# Patient Record
Sex: MALE | Race: OTHER | Employment: FULL TIME | URBAN - METROPOLITAN AREA
[De-identification: names, ages, dates, MRNs, and addresses within clinical notes are randomized per-mention and may not be internally consistent; named-entity substitution may affect disease eponyms.]

---

## 2021-01-01 ENCOUNTER — APPOINTMENT (OUTPATIENT)
Dept: GENERAL RADIOLOGY | Facility: HOSPITAL | Age: 46
DRG: 207 | End: 2021-01-01
Attending: INTERNAL MEDICINE
Payer: OTHER GOVERNMENT

## 2021-01-01 ENCOUNTER — ANESTHESIA (OUTPATIENT)
Dept: MEDSURG UNIT | Facility: HOSPITAL | Age: 46
DRG: 207 | End: 2021-01-01
Payer: OTHER GOVERNMENT

## 2021-01-01 ENCOUNTER — APPOINTMENT (OUTPATIENT)
Dept: CT IMAGING | Facility: HOSPITAL | Age: 46
DRG: 207 | End: 2021-01-01
Attending: INTERNAL MEDICINE
Payer: OTHER GOVERNMENT

## 2021-01-01 ENCOUNTER — HOSPITAL ENCOUNTER (INPATIENT)
Facility: HOSPITAL | Age: 46
LOS: 54 days | DRG: 207 | End: 2022-01-01
Attending: EMERGENCY MEDICINE | Admitting: INTERNAL MEDICINE
Payer: OTHER GOVERNMENT

## 2021-01-01 ENCOUNTER — APPOINTMENT (OUTPATIENT)
Dept: GENERAL RADIOLOGY | Facility: HOSPITAL | Age: 46
DRG: 207 | End: 2021-01-01
Attending: ANESTHESIOLOGY
Payer: OTHER GOVERNMENT

## 2021-01-01 ENCOUNTER — APPOINTMENT (OUTPATIENT)
Dept: ULTRASOUND IMAGING | Facility: HOSPITAL | Age: 46
DRG: 207 | End: 2021-01-01
Attending: INTERNAL MEDICINE
Payer: OTHER GOVERNMENT

## 2021-01-01 ENCOUNTER — APPOINTMENT (OUTPATIENT)
Dept: GENERAL RADIOLOGY | Facility: HOSPITAL | Age: 46
DRG: 207 | End: 2021-01-01
Attending: EMERGENCY MEDICINE
Payer: OTHER GOVERNMENT

## 2021-01-01 ENCOUNTER — APPOINTMENT (OUTPATIENT)
Dept: CT IMAGING | Facility: HOSPITAL | Age: 46
DRG: 207 | End: 2021-01-01
Attending: EMERGENCY MEDICINE
Payer: OTHER GOVERNMENT

## 2021-01-01 DIAGNOSIS — J12.82 PNEUMONIA DUE TO COVID-19 VIRUS: Primary | ICD-10-CM

## 2021-01-01 DIAGNOSIS — E11.65 TYPE 2 DIABETES MELLITUS WITH HYPERGLYCEMIA, WITHOUT LONG-TERM CURRENT USE OF INSULIN (HCC): ICD-10-CM

## 2021-01-01 DIAGNOSIS — U07.1 PNEUMONIA DUE TO COVID-19 VIRUS: Primary | ICD-10-CM

## 2021-01-01 DIAGNOSIS — R09.02 HYPOXIA: ICD-10-CM

## 2021-01-01 LAB
ADENOVIRUS PCR:: NOT DETECTED
ALBUMIN SERPL-MCNC: 1.3 G/DL (ref 3.4–5)
ALBUMIN SERPL-MCNC: 1.4 G/DL (ref 3.4–5)
ALBUMIN SERPL-MCNC: 1.5 G/DL (ref 3.4–5)
ALBUMIN SERPL-MCNC: 1.5 G/DL (ref 3.4–5)
ALBUMIN SERPL-MCNC: 1.6 G/DL (ref 3.4–5)
ALBUMIN SERPL-MCNC: 1.6 G/DL (ref 3.4–5)
ALBUMIN SERPL-MCNC: 1.7 G/DL (ref 3.4–5)
ALBUMIN SERPL-MCNC: 1.8 G/DL (ref 3.4–5)
ALBUMIN SERPL-MCNC: 2 G/DL (ref 3.4–5)
ALBUMIN SERPL-MCNC: 2.1 G/DL (ref 3.4–5)
ALBUMIN SERPL-MCNC: 2.2 G/DL (ref 3.4–5)
ALBUMIN SERPL-MCNC: 2.3 G/DL (ref 3.4–5)
ALBUMIN SERPL-MCNC: 2.4 G/DL (ref 3.4–5)
ALBUMIN SERPL-MCNC: 2.8 G/DL (ref 3.4–5)
ALBUMIN/GLOB SERPL: 0.3 {RATIO} (ref 1–2)
ALBUMIN/GLOB SERPL: 0.4 {RATIO} (ref 1–2)
ALBUMIN/GLOB SERPL: 0.5 {RATIO} (ref 1–2)
ALBUMIN/GLOB SERPL: 0.6 {RATIO} (ref 1–2)
ALBUMIN/GLOB SERPL: 0.7 {RATIO} (ref 1–2)
ALP LIVER SERPL-CCNC: 105 U/L
ALP LIVER SERPL-CCNC: 111 U/L
ALP LIVER SERPL-CCNC: 112 U/L
ALP LIVER SERPL-CCNC: 113 U/L
ALP LIVER SERPL-CCNC: 122 U/L
ALP LIVER SERPL-CCNC: 122 U/L
ALP LIVER SERPL-CCNC: 144 U/L
ALP LIVER SERPL-CCNC: 73 U/L
ALP LIVER SERPL-CCNC: 74 U/L
ALP LIVER SERPL-CCNC: 76 U/L
ALP LIVER SERPL-CCNC: 78 U/L
ALP LIVER SERPL-CCNC: 80 U/L
ALP LIVER SERPL-CCNC: 80 U/L
ALP LIVER SERPL-CCNC: 81 U/L
ALP LIVER SERPL-CCNC: 82 U/L
ALP LIVER SERPL-CCNC: 88 U/L
ALP LIVER SERPL-CCNC: 88 U/L
ALP LIVER SERPL-CCNC: 89 U/L
ALP LIVER SERPL-CCNC: 90 U/L
ALP LIVER SERPL-CCNC: 91 U/L
ALP LIVER SERPL-CCNC: 93 U/L
ALP LIVER SERPL-CCNC: 94 U/L
ALP LIVER SERPL-CCNC: 96 U/L
ALP LIVER SERPL-CCNC: 97 U/L
ALP LIVER SERPL-CCNC: 98 U/L
ALT SERPL-CCNC: 15 U/L
ALT SERPL-CCNC: 17 U/L
ALT SERPL-CCNC: 19 U/L
ALT SERPL-CCNC: 20 U/L
ALT SERPL-CCNC: 21 U/L
ALT SERPL-CCNC: 22 U/L
ALT SERPL-CCNC: 22 U/L
ALT SERPL-CCNC: 23 U/L
ALT SERPL-CCNC: 24 U/L
ALT SERPL-CCNC: 25 U/L
ALT SERPL-CCNC: 26 U/L
ALT SERPL-CCNC: 29 U/L
ALT SERPL-CCNC: 30 U/L
ALT SERPL-CCNC: 33 U/L
ALT SERPL-CCNC: 36 U/L
ALT SERPL-CCNC: 37 U/L
ALT SERPL-CCNC: 37 U/L
ALT SERPL-CCNC: 38 U/L
ALT SERPL-CCNC: 39 U/L
ALT SERPL-CCNC: 46 U/L
ALT SERPL-CCNC: 46 U/L
ALT SERPL-CCNC: 48 U/L
ANION GAP SERPL CALC-SCNC: 0 MMOL/L (ref 0–18)
ANION GAP SERPL CALC-SCNC: 1 MMOL/L (ref 0–18)
ANION GAP SERPL CALC-SCNC: 1 MMOL/L (ref 0–18)
ANION GAP SERPL CALC-SCNC: 2 MMOL/L (ref 0–18)
ANION GAP SERPL CALC-SCNC: 3 MMOL/L (ref 0–18)
ANION GAP SERPL CALC-SCNC: 4 MMOL/L (ref 0–18)
ANION GAP SERPL CALC-SCNC: 5 MMOL/L (ref 0–18)
ANION GAP SERPL CALC-SCNC: 6 MMOL/L (ref 0–18)
ANION GAP SERPL CALC-SCNC: 7 MMOL/L (ref 0–18)
ANION GAP SERPL CALC-SCNC: 8 MMOL/L (ref 0–18)
ANION GAP SERPL CALC-SCNC: 9 MMOL/L (ref 0–18)
ANION GAP SERPL CALC-SCNC: <0 MMOL/L (ref 0–18)
ARTERIAL PATENCY WRIST A: POSITIVE
AST SERPL-CCNC: 14 U/L (ref 15–37)
AST SERPL-CCNC: 15 U/L (ref 15–37)
AST SERPL-CCNC: 16 U/L (ref 15–37)
AST SERPL-CCNC: 17 U/L (ref 15–37)
AST SERPL-CCNC: 17 U/L (ref 15–37)
AST SERPL-CCNC: 19 U/L (ref 15–37)
AST SERPL-CCNC: 19 U/L (ref 15–37)
AST SERPL-CCNC: 20 U/L (ref 15–37)
AST SERPL-CCNC: 21 U/L (ref 15–37)
AST SERPL-CCNC: 23 U/L (ref 15–37)
AST SERPL-CCNC: 23 U/L (ref 15–37)
AST SERPL-CCNC: 24 U/L (ref 15–37)
AST SERPL-CCNC: 26 U/L (ref 15–37)
AST SERPL-CCNC: 26 U/L (ref 15–37)
AST SERPL-CCNC: 28 U/L (ref 15–37)
AST SERPL-CCNC: 30 U/L (ref 15–37)
AST SERPL-CCNC: 37 U/L (ref 15–37)
AST SERPL-CCNC: 60 U/L (ref 15–37)
ATRIAL RATE: 79 BPM
ATRIAL RATE: 84 BPM
B PARAPERT DNA SPEC QL NAA+PROBE: NOT DETECTED
B PERT DNA SPEC QL NAA+PROBE: NOT DETECTED
BASE EXCESS BLDA CALC-SCNC: -0.3 MMOL/L (ref ?–2)
BASE EXCESS BLDA CALC-SCNC: -1.1 MMOL/L (ref ?–2)
BASE EXCESS BLDA CALC-SCNC: 0.9 MMOL/L (ref ?–2)
BASE EXCESS BLDA CALC-SCNC: 10.4 MMOL/L (ref ?–2)
BASE EXCESS BLDA CALC-SCNC: 11 MMOL/L (ref ?–2)
BASE EXCESS BLDA CALC-SCNC: 12 MMOL/L (ref ?–2)
BASE EXCESS BLDA CALC-SCNC: 14 MMOL/L (ref ?–2)
BASE EXCESS BLDA CALC-SCNC: 3.2 MMOL/L (ref ?–2)
BASE EXCESS BLDA CALC-SCNC: 3.4 MMOL/L (ref ?–2)
BASE EXCESS BLDA CALC-SCNC: 3.6 MMOL/L (ref ?–2)
BASE EXCESS BLDA CALC-SCNC: 3.9 MMOL/L (ref ?–2)
BASE EXCESS BLDA CALC-SCNC: 6.1 MMOL/L (ref ?–2)
BASE EXCESS BLDA CALC-SCNC: 7.7 MMOL/L (ref ?–2)
BASOPHILS # BLD AUTO: 0 X10(3) UL (ref 0–0.2)
BASOPHILS # BLD AUTO: 0.01 X10(3) UL (ref 0–0.2)
BASOPHILS # BLD AUTO: 0.02 X10(3) UL (ref 0–0.2)
BASOPHILS # BLD AUTO: 0.03 X10(3) UL (ref 0–0.2)
BASOPHILS # BLD AUTO: 0.03 X10(3) UL (ref 0–0.2)
BASOPHILS # BLD AUTO: 0.04 X10(3) UL (ref 0–0.2)
BASOPHILS # BLD AUTO: 0.05 X10(3) UL (ref 0–0.2)
BASOPHILS # BLD AUTO: 0.06 X10(3) UL (ref 0–0.2)
BASOPHILS # BLD AUTO: 0.08 X10(3) UL (ref 0–0.2)
BASOPHILS # BLD AUTO: 0.08 X10(3) UL (ref 0–0.2)
BASOPHILS # BLD AUTO: 0.09 X10(3) UL (ref 0–0.2)
BASOPHILS # BLD AUTO: 0.09 X10(3) UL (ref 0–0.2)
BASOPHILS # BLD AUTO: 0.1 X10(3) UL (ref 0–0.2)
BASOPHILS NFR BLD AUTO: 0 %
BASOPHILS NFR BLD AUTO: 0.1 %
BASOPHILS NFR BLD AUTO: 0.2 %
BASOPHILS NFR BLD AUTO: 0.2 %
BASOPHILS NFR BLD AUTO: 0.3 %
BASOPHILS NFR BLD AUTO: 0.4 %
BASOPHILS NFR BLD AUTO: 0.5 %
BASOPHILS NFR BLD AUTO: 0.6 %
BASOPHILS NFR BLD AUTO: 0.7 %
BASOPHILS NFR BLD AUTO: 0.8 %
BASOPHILS NFR BLD AUTO: 1 %
BASOPHILS NFR BLD AUTO: 1.1 %
BASOPHILS NFR BLD AUTO: 1.2 %
BILIRUB SERPL-MCNC: 0.2 MG/DL (ref 0.1–2)
BILIRUB SERPL-MCNC: 0.3 MG/DL (ref 0.1–2)
BILIRUB SERPL-MCNC: 0.4 MG/DL (ref 0.1–2)
BILIRUB SERPL-MCNC: 0.5 MG/DL (ref 0.1–2)
BILIRUB SERPL-MCNC: 0.6 MG/DL (ref 0.1–2)
BILIRUB SERPL-MCNC: 0.8 MG/DL (ref 0.1–2)
BILIRUB SERPL-MCNC: 0.9 MG/DL (ref 0.1–2)
BILIRUB SERPL-MCNC: 1.1 MG/DL (ref 0.1–2)
BILIRUB UR QL STRIP.AUTO: NEGATIVE
BILIRUB UR QL STRIP.AUTO: NEGATIVE
BODY TEMPERATURE: 100 F
BODY TEMPERATURE: 100.7 F
BODY TEMPERATURE: 100.9 F
BODY TEMPERATURE: 102.2 F
BODY TEMPERATURE: 96.4 F
BODY TEMPERATURE: 97.6 F
BODY TEMPERATURE: 98 F
BODY TEMPERATURE: 98.4 F
BODY TEMPERATURE: 98.4 F
BODY TEMPERATURE: 98.5 F
BODY TEMPERATURE: 99.3 F
BUN BLD-MCNC: 10 MG/DL (ref 7–18)
BUN BLD-MCNC: 13 MG/DL (ref 7–18)
BUN BLD-MCNC: 13 MG/DL (ref 7–18)
BUN BLD-MCNC: 15 MG/DL (ref 7–18)
BUN BLD-MCNC: 16 MG/DL (ref 7–18)
BUN BLD-MCNC: 17 MG/DL (ref 7–18)
BUN BLD-MCNC: 18 MG/DL (ref 7–18)
BUN BLD-MCNC: 19 MG/DL (ref 7–18)
BUN BLD-MCNC: 20 MG/DL (ref 7–18)
BUN BLD-MCNC: 20 MG/DL (ref 7–18)
BUN BLD-MCNC: 21 MG/DL (ref 7–18)
BUN BLD-MCNC: 22 MG/DL (ref 7–18)
BUN BLD-MCNC: 23 MG/DL (ref 7–18)
BUN BLD-MCNC: 24 MG/DL (ref 7–18)
BUN BLD-MCNC: 24 MG/DL (ref 7–18)
BUN BLD-MCNC: 25 MG/DL (ref 7–18)
BUN BLD-MCNC: 25 MG/DL (ref 7–18)
BUN BLD-MCNC: 28 MG/DL (ref 7–18)
C PNEUM DNA SPEC QL NAA+PROBE: NOT DETECTED
CA-I BLD-SCNC: 1.15 MMOL/L (ref 1.12–1.32)
CA-I BLD-SCNC: 1.16 MMOL/L (ref 1.12–1.32)
CALCIUM BLD-MCNC: 7.8 MG/DL (ref 8.5–10.1)
CALCIUM BLD-MCNC: 7.8 MG/DL (ref 8.5–10.1)
CALCIUM BLD-MCNC: 7.9 MG/DL (ref 8.5–10.1)
CALCIUM BLD-MCNC: 8 MG/DL (ref 8.5–10.1)
CALCIUM BLD-MCNC: 8.1 MG/DL (ref 8.5–10.1)
CALCIUM BLD-MCNC: 8.1 MG/DL (ref 8.5–10.1)
CALCIUM BLD-MCNC: 8.2 MG/DL (ref 8.5–10.1)
CALCIUM BLD-MCNC: 8.3 MG/DL (ref 8.5–10.1)
CALCIUM BLD-MCNC: 8.4 MG/DL (ref 8.5–10.1)
CALCIUM BLD-MCNC: 8.5 MG/DL (ref 8.5–10.1)
CALCIUM BLD-MCNC: 8.5 MG/DL (ref 8.5–10.1)
CALCIUM BLD-MCNC: 8.6 MG/DL (ref 8.5–10.1)
CALCIUM BLD-MCNC: 8.7 MG/DL (ref 8.5–10.1)
CALCIUM BLD-MCNC: 8.8 MG/DL (ref 8.5–10.1)
CALCIUM BLD-MCNC: 8.9 MG/DL (ref 8.5–10.1)
CHLORIDE SERPL-SCNC: 100 MMOL/L (ref 98–112)
CHLORIDE SERPL-SCNC: 101 MMOL/L (ref 98–112)
CHLORIDE SERPL-SCNC: 103 MMOL/L (ref 98–112)
CHLORIDE SERPL-SCNC: 103 MMOL/L (ref 98–112)
CHLORIDE SERPL-SCNC: 105 MMOL/L (ref 98–112)
CHLORIDE SERPL-SCNC: 105 MMOL/L (ref 98–112)
CHLORIDE SERPL-SCNC: 93 MMOL/L (ref 98–112)
CHLORIDE SERPL-SCNC: 93 MMOL/L (ref 98–112)
CHLORIDE SERPL-SCNC: 96 MMOL/L (ref 98–112)
CHLORIDE SERPL-SCNC: 97 MMOL/L (ref 98–112)
CHLORIDE SERPL-SCNC: 98 MMOL/L (ref 98–112)
CHLORIDE SERPL-SCNC: 99 MMOL/L (ref 98–112)
CK SERPL-CCNC: 47 U/L
CLARITY UR REFRACT.AUTO: CLEAR
CO2 SERPL-SCNC: 27 MMOL/L (ref 21–32)
CO2 SERPL-SCNC: 28 MMOL/L (ref 21–32)
CO2 SERPL-SCNC: 29 MMOL/L (ref 21–32)
CO2 SERPL-SCNC: 30 MMOL/L (ref 21–32)
CO2 SERPL-SCNC: 31 MMOL/L (ref 21–32)
CO2 SERPL-SCNC: 32 MMOL/L (ref 21–32)
CO2 SERPL-SCNC: 34 MMOL/L (ref 21–32)
CO2 SERPL-SCNC: 36 MMOL/L (ref 21–32)
CO2 SERPL-SCNC: 36 MMOL/L (ref 21–32)
CO2 SERPL-SCNC: 38 MMOL/L (ref 21–32)
CO2 SERPL-SCNC: 38 MMOL/L (ref 21–32)
CO2 SERPL-SCNC: 39 MMOL/L (ref 21–32)
CO2 SERPL-SCNC: 41 MMOL/L (ref 21–32)
COHGB MFR BLD: 0.9 % SAT (ref 0–3)
COHGB MFR BLD: 0.9 % SAT (ref 0–3)
COHGB MFR BLD: 1 % SAT (ref 0–3)
COHGB MFR BLD: 1.1 % SAT (ref 0–3)
COHGB MFR BLD: 1.2 % SAT (ref 0–3)
COLOR UR AUTO: YELLOW
CORONAVIRUS 229E PCR:: NOT DETECTED
CORONAVIRUS HKU1 PCR:: NOT DETECTED
CORONAVIRUS NL63 PCR:: NOT DETECTED
CORONAVIRUS OC43 PCR:: NOT DETECTED
CREAT BLD-MCNC: 0.34 MG/DL
CREAT BLD-MCNC: 0.35 MG/DL
CREAT BLD-MCNC: 0.35 MG/DL
CREAT BLD-MCNC: 0.37 MG/DL
CREAT BLD-MCNC: 0.39 MG/DL
CREAT BLD-MCNC: 0.42 MG/DL
CREAT BLD-MCNC: 0.5 MG/DL
CREAT BLD-MCNC: 0.52 MG/DL
CREAT BLD-MCNC: 0.54 MG/DL
CREAT BLD-MCNC: 0.55 MG/DL
CREAT BLD-MCNC: 0.56 MG/DL
CREAT BLD-MCNC: 0.63 MG/DL
CREAT BLD-MCNC: 0.66 MG/DL
CREAT BLD-MCNC: 0.66 MG/DL
CREAT BLD-MCNC: 0.67 MG/DL
CREAT BLD-MCNC: 0.68 MG/DL
CREAT BLD-MCNC: 0.69 MG/DL
CREAT BLD-MCNC: 0.7 MG/DL
CREAT BLD-MCNC: 0.7 MG/DL
CREAT BLD-MCNC: 0.71 MG/DL
CREAT BLD-MCNC: 0.75 MG/DL
CREAT BLD-MCNC: 0.8 MG/DL
CREAT BLD-MCNC: 0.83 MG/DL
CREAT BLD-MCNC: 0.87 MG/DL
CREAT BLD-MCNC: 0.88 MG/DL
CRP SERPL-MCNC: 10.4 MG/DL (ref ?–0.3)
CRP SERPL-MCNC: 11.5 MG/DL (ref ?–0.3)
CRP SERPL-MCNC: 11.6 MG/DL (ref ?–0.3)
CRP SERPL-MCNC: 12.9 MG/DL (ref ?–0.3)
CRP SERPL-MCNC: 13.1 MG/DL (ref ?–0.3)
CRP SERPL-MCNC: 13.5 MG/DL (ref ?–0.3)
CRP SERPL-MCNC: 13.5 MG/DL (ref ?–0.3)
CRP SERPL-MCNC: 14 MG/DL (ref ?–0.3)
CRP SERPL-MCNC: 14.2 MG/DL (ref ?–0.3)
CRP SERPL-MCNC: 14.4 MG/DL (ref ?–0.3)
CRP SERPL-MCNC: 15.6 MG/DL (ref ?–0.3)
CRP SERPL-MCNC: 15.9 MG/DL (ref ?–0.3)
CRP SERPL-MCNC: 2.6 MG/DL (ref ?–0.3)
CRP SERPL-MCNC: 20.4 MG/DL (ref ?–0.3)
CRP SERPL-MCNC: 23.4 MG/DL (ref ?–0.3)
CRP SERPL-MCNC: 4.09 MG/DL (ref ?–0.3)
CRP SERPL-MCNC: 5.97 MG/DL (ref ?–0.3)
CRP SERPL-MCNC: 8.22 MG/DL (ref ?–0.3)
D DIMER PPP FEU-MCNC: 0.27 UG/ML FEU (ref ?–0.5)
D DIMER PPP FEU-MCNC: 0.36 UG/ML FEU (ref ?–0.5)
D DIMER PPP FEU-MCNC: 0.43 UG/ML FEU (ref ?–0.5)
D DIMER PPP FEU-MCNC: 0.43 UG/ML FEU (ref ?–0.5)
D DIMER PPP FEU-MCNC: 0.45 UG/ML FEU (ref ?–0.5)
D DIMER PPP FEU-MCNC: 0.54 UG/ML FEU (ref ?–0.5)
D DIMER PPP FEU-MCNC: 0.64 UG/ML FEU (ref ?–0.5)
D DIMER PPP FEU-MCNC: 0.98 UG/ML FEU (ref ?–0.5)
D DIMER PPP FEU-MCNC: 1.09 UG/ML FEU (ref ?–0.5)
D DIMER PPP FEU-MCNC: 1.78 UG/ML FEU (ref ?–0.5)
D DIMER PPP FEU-MCNC: 1.83 UG/ML FEU (ref ?–0.5)
D DIMER PPP FEU-MCNC: 2.06 UG/ML FEU (ref ?–0.5)
D DIMER PPP FEU-MCNC: 2.09 UG/ML FEU (ref ?–0.5)
D DIMER PPP FEU-MCNC: 2.16 UG/ML FEU (ref ?–0.5)
D DIMER PPP FEU-MCNC: 2.95 UG/ML FEU (ref ?–0.5)
DEPRECATED HBV CORE AB SER IA-ACNC: 553.9 NG/ML
DEPRECATED HBV CORE AB SER IA-ACNC: 562.2 NG/ML
DEPRECATED HBV CORE AB SER IA-ACNC: 594.9 NG/ML
DEPRECATED HBV CORE AB SER IA-ACNC: 595.9 NG/ML
DEPRECATED HBV CORE AB SER IA-ACNC: 616 NG/ML
DEPRECATED HBV CORE AB SER IA-ACNC: 640.7 NG/ML
DEPRECATED HBV CORE AB SER IA-ACNC: 664.4 NG/ML
DEPRECATED HBV CORE AB SER IA-ACNC: 681.4 NG/ML
DEPRECATED HBV CORE AB SER IA-ACNC: 697 NG/ML
DEPRECATED HBV CORE AB SER IA-ACNC: 702.5 NG/ML
DEPRECATED HBV CORE AB SER IA-ACNC: 710.1 NG/ML
DEPRECATED HBV CORE AB SER IA-ACNC: 728.1 NG/ML
DEPRECATED HBV CORE AB SER IA-ACNC: 752.9 NG/ML
DEPRECATED HBV CORE AB SER IA-ACNC: 795.4 NG/ML
DEPRECATED HBV CORE AB SER IA-ACNC: 804.6 NG/ML
DEPRECATED HBV CORE AB SER IA-ACNC: 824.7 NG/ML
EOSINOPHIL # BLD AUTO: 0 X10(3) UL (ref 0–0.7)
EOSINOPHIL # BLD AUTO: 0.03 X10(3) UL (ref 0–0.7)
EOSINOPHIL # BLD AUTO: 0.06 X10(3) UL (ref 0–0.7)
EOSINOPHIL # BLD AUTO: 0.08 X10(3) UL (ref 0–0.7)
EOSINOPHIL # BLD AUTO: 0.12 X10(3) UL (ref 0–0.7)
EOSINOPHIL # BLD AUTO: 0.16 X10(3) UL (ref 0–0.7)
EOSINOPHIL # BLD AUTO: 0.16 X10(3) UL (ref 0–0.7)
EOSINOPHIL # BLD AUTO: 0.17 X10(3) UL (ref 0–0.7)
EOSINOPHIL # BLD AUTO: 0.17 X10(3) UL (ref 0–0.7)
EOSINOPHIL # BLD AUTO: 0.18 X10(3) UL (ref 0–0.7)
EOSINOPHIL # BLD AUTO: 0.18 X10(3) UL (ref 0–0.7)
EOSINOPHIL # BLD AUTO: 0.19 X10(3) UL (ref 0–0.7)
EOSINOPHIL # BLD AUTO: 0.21 X10(3) UL (ref 0–0.7)
EOSINOPHIL # BLD AUTO: 0.24 X10(3) UL (ref 0–0.7)
EOSINOPHIL # BLD AUTO: 0.25 X10(3) UL (ref 0–0.7)
EOSINOPHIL # BLD AUTO: 0.27 X10(3) UL (ref 0–0.7)
EOSINOPHIL # BLD AUTO: 0.29 X10(3) UL (ref 0–0.7)
EOSINOPHIL # BLD AUTO: 0.31 X10(3) UL (ref 0–0.7)
EOSINOPHIL # BLD AUTO: 0.45 X10(3) UL (ref 0–0.7)
EOSINOPHIL # BLD AUTO: 0.49 X10(3) UL (ref 0–0.7)
EOSINOPHIL # BLD AUTO: 0.66 X10(3) UL (ref 0–0.7)
EOSINOPHIL # BLD AUTO: 0.69 X10(3) UL (ref 0–0.7)
EOSINOPHIL # BLD AUTO: 0.76 X10(3) UL (ref 0–0.7)
EOSINOPHIL NFR BLD AUTO: 0 %
EOSINOPHIL NFR BLD AUTO: 0.3 %
EOSINOPHIL NFR BLD AUTO: 0.5 %
EOSINOPHIL NFR BLD AUTO: 0.9 %
EOSINOPHIL NFR BLD AUTO: 0.9 %
EOSINOPHIL NFR BLD AUTO: 1.7 %
EOSINOPHIL NFR BLD AUTO: 1.8 %
EOSINOPHIL NFR BLD AUTO: 1.9 %
EOSINOPHIL NFR BLD AUTO: 1.9 %
EOSINOPHIL NFR BLD AUTO: 2 %
EOSINOPHIL NFR BLD AUTO: 2.4 %
EOSINOPHIL NFR BLD AUTO: 2.7 %
EOSINOPHIL NFR BLD AUTO: 2.8 %
EOSINOPHIL NFR BLD AUTO: 3.2 %
EOSINOPHIL NFR BLD AUTO: 3.5 %
EOSINOPHIL NFR BLD AUTO: 3.7 %
EOSINOPHIL NFR BLD AUTO: 4.2 %
EOSINOPHIL NFR BLD AUTO: 4.6 %
EOSINOPHIL NFR BLD AUTO: 5.7 %
EOSINOPHIL NFR BLD AUTO: 7.4 %
EOSINOPHIL NFR BLD AUTO: 7.6 %
ERYTHROCYTE [DISTWIDTH] IN BLOOD BY AUTOMATED COUNT: 11.7 %
ERYTHROCYTE [DISTWIDTH] IN BLOOD BY AUTOMATED COUNT: 11.8 %
ERYTHROCYTE [DISTWIDTH] IN BLOOD BY AUTOMATED COUNT: 11.9 %
ERYTHROCYTE [DISTWIDTH] IN BLOOD BY AUTOMATED COUNT: 12 %
ERYTHROCYTE [DISTWIDTH] IN BLOOD BY AUTOMATED COUNT: 12.1 %
ERYTHROCYTE [DISTWIDTH] IN BLOOD BY AUTOMATED COUNT: 12.3 %
ERYTHROCYTE [DISTWIDTH] IN BLOOD BY AUTOMATED COUNT: 12.4 %
ERYTHROCYTE [DISTWIDTH] IN BLOOD BY AUTOMATED COUNT: 12.4 %
ERYTHROCYTE [DISTWIDTH] IN BLOOD BY AUTOMATED COUNT: 12.5 %
EST. AVERAGE GLUCOSE BLD GHB EST-MCNC: 329 MG/DL (ref 68–126)
FIO2: 100 %
FIO2: 50 %
FIO2: 50 %
FIO2: 70 %
FIO2: 80 %
FIO2: 80 %
FIO2: 90 %
FIO2: 90 %
FLUAV RNA SPEC QL NAA+PROBE: NOT DETECTED
FLUBV RNA SPEC QL NAA+PROBE: NOT DETECTED
GLOBULIN PLAS-MCNC: 3.5 G/DL (ref 2.8–4.4)
GLOBULIN PLAS-MCNC: 3.8 G/DL (ref 2.8–4.4)
GLOBULIN PLAS-MCNC: 3.8 G/DL (ref 2.8–4.4)
GLOBULIN PLAS-MCNC: 4 G/DL (ref 2.8–4.4)
GLOBULIN PLAS-MCNC: 4.1 G/DL (ref 2.8–4.4)
GLOBULIN PLAS-MCNC: 4.2 G/DL (ref 2.8–4.4)
GLOBULIN PLAS-MCNC: 4.3 G/DL (ref 2.8–4.4)
GLOBULIN PLAS-MCNC: 4.4 G/DL (ref 2.8–4.4)
GLOBULIN PLAS-MCNC: 4.5 G/DL (ref 2.8–4.4)
GLOBULIN PLAS-MCNC: 4.5 G/DL (ref 2.8–4.4)
GLOBULIN PLAS-MCNC: 4.7 G/DL (ref 2.8–4.4)
GLOBULIN PLAS-MCNC: 4.9 G/DL (ref 2.8–4.4)
GLOBULIN PLAS-MCNC: 5.1 G/DL (ref 2.8–4.4)
GLOBULIN PLAS-MCNC: 5.6 G/DL (ref 2.8–4.4)
GLOBULIN PLAS-MCNC: 5.7 G/DL (ref 2.8–4.4)
GLOBULIN PLAS-MCNC: 5.7 G/DL (ref 2.8–4.4)
GLUCOSE BLD-MCNC: 101 MG/DL (ref 70–99)
GLUCOSE BLD-MCNC: 104 MG/DL (ref 70–99)
GLUCOSE BLD-MCNC: 110 MG/DL (ref 70–99)
GLUCOSE BLD-MCNC: 114 MG/DL (ref 70–99)
GLUCOSE BLD-MCNC: 115 MG/DL (ref 70–99)
GLUCOSE BLD-MCNC: 117 MG/DL (ref 70–99)
GLUCOSE BLD-MCNC: 119 MG/DL (ref 70–99)
GLUCOSE BLD-MCNC: 121 MG/DL (ref 70–99)
GLUCOSE BLD-MCNC: 123 MG/DL (ref 70–99)
GLUCOSE BLD-MCNC: 124 MG/DL (ref 70–99)
GLUCOSE BLD-MCNC: 132 MG/DL (ref 70–99)
GLUCOSE BLD-MCNC: 133 MG/DL (ref 70–99)
GLUCOSE BLD-MCNC: 133 MG/DL (ref 70–99)
GLUCOSE BLD-MCNC: 136 MG/DL (ref 70–99)
GLUCOSE BLD-MCNC: 141 MG/DL (ref 70–99)
GLUCOSE BLD-MCNC: 143 MG/DL (ref 70–99)
GLUCOSE BLD-MCNC: 143 MG/DL (ref 70–99)
GLUCOSE BLD-MCNC: 153 MG/DL (ref 70–99)
GLUCOSE BLD-MCNC: 156 MG/DL (ref 70–99)
GLUCOSE BLD-MCNC: 157 MG/DL (ref 70–99)
GLUCOSE BLD-MCNC: 157 MG/DL (ref 70–99)
GLUCOSE BLD-MCNC: 160 MG/DL (ref 70–99)
GLUCOSE BLD-MCNC: 160 MG/DL (ref 70–99)
GLUCOSE BLD-MCNC: 161 MG/DL (ref 70–99)
GLUCOSE BLD-MCNC: 164 MG/DL (ref 70–99)
GLUCOSE BLD-MCNC: 166 MG/DL (ref 70–99)
GLUCOSE BLD-MCNC: 169 MG/DL (ref 70–99)
GLUCOSE BLD-MCNC: 171 MG/DL (ref 70–99)
GLUCOSE BLD-MCNC: 172 MG/DL (ref 70–99)
GLUCOSE BLD-MCNC: 172 MG/DL (ref 70–99)
GLUCOSE BLD-MCNC: 173 MG/DL (ref 70–99)
GLUCOSE BLD-MCNC: 174 MG/DL (ref 70–99)
GLUCOSE BLD-MCNC: 175 MG/DL (ref 70–99)
GLUCOSE BLD-MCNC: 179 MG/DL (ref 70–99)
GLUCOSE BLD-MCNC: 179 MG/DL (ref 70–99)
GLUCOSE BLD-MCNC: 180 MG/DL (ref 70–99)
GLUCOSE BLD-MCNC: 180 MG/DL (ref 70–99)
GLUCOSE BLD-MCNC: 181 MG/DL (ref 70–99)
GLUCOSE BLD-MCNC: 182 MG/DL (ref 70–99)
GLUCOSE BLD-MCNC: 183 MG/DL (ref 70–99)
GLUCOSE BLD-MCNC: 184 MG/DL (ref 70–99)
GLUCOSE BLD-MCNC: 187 MG/DL (ref 70–99)
GLUCOSE BLD-MCNC: 188 MG/DL (ref 70–99)
GLUCOSE BLD-MCNC: 190 MG/DL (ref 70–99)
GLUCOSE BLD-MCNC: 191 MG/DL (ref 70–99)
GLUCOSE BLD-MCNC: 192 MG/DL (ref 70–99)
GLUCOSE BLD-MCNC: 196 MG/DL (ref 70–99)
GLUCOSE BLD-MCNC: 197 MG/DL (ref 70–99)
GLUCOSE BLD-MCNC: 197 MG/DL (ref 70–99)
GLUCOSE BLD-MCNC: 199 MG/DL (ref 70–99)
GLUCOSE BLD-MCNC: 207 MG/DL (ref 70–99)
GLUCOSE BLD-MCNC: 207 MG/DL (ref 70–99)
GLUCOSE BLD-MCNC: 208 MG/DL (ref 70–99)
GLUCOSE BLD-MCNC: 209 MG/DL (ref 70–99)
GLUCOSE BLD-MCNC: 210 MG/DL (ref 70–99)
GLUCOSE BLD-MCNC: 211 MG/DL (ref 70–99)
GLUCOSE BLD-MCNC: 212 MG/DL (ref 70–99)
GLUCOSE BLD-MCNC: 212 MG/DL (ref 70–99)
GLUCOSE BLD-MCNC: 214 MG/DL (ref 70–99)
GLUCOSE BLD-MCNC: 215 MG/DL (ref 70–99)
GLUCOSE BLD-MCNC: 218 MG/DL (ref 70–99)
GLUCOSE BLD-MCNC: 219 MG/DL (ref 70–99)
GLUCOSE BLD-MCNC: 219 MG/DL (ref 70–99)
GLUCOSE BLD-MCNC: 221 MG/DL (ref 70–99)
GLUCOSE BLD-MCNC: 221 MG/DL (ref 70–99)
GLUCOSE BLD-MCNC: 223 MG/DL (ref 70–99)
GLUCOSE BLD-MCNC: 223 MG/DL (ref 70–99)
GLUCOSE BLD-MCNC: 224 MG/DL (ref 70–99)
GLUCOSE BLD-MCNC: 224 MG/DL (ref 70–99)
GLUCOSE BLD-MCNC: 226 MG/DL (ref 70–99)
GLUCOSE BLD-MCNC: 226 MG/DL (ref 70–99)
GLUCOSE BLD-MCNC: 227 MG/DL (ref 70–99)
GLUCOSE BLD-MCNC: 227 MG/DL (ref 70–99)
GLUCOSE BLD-MCNC: 230 MG/DL (ref 70–99)
GLUCOSE BLD-MCNC: 231 MG/DL (ref 70–99)
GLUCOSE BLD-MCNC: 232 MG/DL (ref 70–99)
GLUCOSE BLD-MCNC: 233 MG/DL (ref 70–99)
GLUCOSE BLD-MCNC: 233 MG/DL (ref 70–99)
GLUCOSE BLD-MCNC: 234 MG/DL (ref 70–99)
GLUCOSE BLD-MCNC: 235 MG/DL (ref 70–99)
GLUCOSE BLD-MCNC: 237 MG/DL (ref 70–99)
GLUCOSE BLD-MCNC: 238 MG/DL (ref 70–99)
GLUCOSE BLD-MCNC: 239 MG/DL (ref 70–99)
GLUCOSE BLD-MCNC: 239 MG/DL (ref 70–99)
GLUCOSE BLD-MCNC: 240 MG/DL (ref 70–99)
GLUCOSE BLD-MCNC: 241 MG/DL (ref 70–99)
GLUCOSE BLD-MCNC: 241 MG/DL (ref 70–99)
GLUCOSE BLD-MCNC: 243 MG/DL (ref 70–99)
GLUCOSE BLD-MCNC: 243 MG/DL (ref 70–99)
GLUCOSE BLD-MCNC: 244 MG/DL (ref 70–99)
GLUCOSE BLD-MCNC: 246 MG/DL (ref 70–99)
GLUCOSE BLD-MCNC: 247 MG/DL (ref 70–99)
GLUCOSE BLD-MCNC: 249 MG/DL (ref 70–99)
GLUCOSE BLD-MCNC: 250 MG/DL (ref 70–99)
GLUCOSE BLD-MCNC: 251 MG/DL (ref 70–99)
GLUCOSE BLD-MCNC: 253 MG/DL (ref 70–99)
GLUCOSE BLD-MCNC: 254 MG/DL (ref 70–99)
GLUCOSE BLD-MCNC: 256 MG/DL (ref 70–99)
GLUCOSE BLD-MCNC: 260 MG/DL (ref 70–99)
GLUCOSE BLD-MCNC: 260 MG/DL (ref 70–99)
GLUCOSE BLD-MCNC: 262 MG/DL (ref 70–99)
GLUCOSE BLD-MCNC: 264 MG/DL (ref 70–99)
GLUCOSE BLD-MCNC: 266 MG/DL (ref 70–99)
GLUCOSE BLD-MCNC: 268 MG/DL (ref 70–99)
GLUCOSE BLD-MCNC: 269 MG/DL (ref 70–99)
GLUCOSE BLD-MCNC: 270 MG/DL (ref 70–99)
GLUCOSE BLD-MCNC: 272 MG/DL (ref 70–99)
GLUCOSE BLD-MCNC: 272 MG/DL (ref 70–99)
GLUCOSE BLD-MCNC: 274 MG/DL (ref 70–99)
GLUCOSE BLD-MCNC: 275 MG/DL (ref 70–99)
GLUCOSE BLD-MCNC: 276 MG/DL (ref 70–99)
GLUCOSE BLD-MCNC: 283 MG/DL (ref 70–99)
GLUCOSE BLD-MCNC: 289 MG/DL (ref 70–99)
GLUCOSE BLD-MCNC: 291 MG/DL (ref 70–99)
GLUCOSE BLD-MCNC: 292 MG/DL (ref 70–99)
GLUCOSE BLD-MCNC: 293 MG/DL (ref 70–99)
GLUCOSE BLD-MCNC: 294 MG/DL (ref 70–99)
GLUCOSE BLD-MCNC: 296 MG/DL (ref 70–99)
GLUCOSE BLD-MCNC: 301 MG/DL (ref 70–99)
GLUCOSE BLD-MCNC: 309 MG/DL (ref 70–99)
GLUCOSE BLD-MCNC: 310 MG/DL (ref 70–99)
GLUCOSE BLD-MCNC: 318 MG/DL (ref 70–99)
GLUCOSE BLD-MCNC: 322 MG/DL (ref 70–99)
GLUCOSE BLD-MCNC: 334 MG/DL (ref 70–99)
GLUCOSE BLD-MCNC: 340 MG/DL (ref 70–99)
GLUCOSE BLD-MCNC: 387 MG/DL (ref 70–99)
GLUCOSE BLD-MCNC: 407 MG/DL (ref 70–99)
GLUCOSE BLD-MCNC: 63 MG/DL (ref 70–99)
GLUCOSE BLD-MCNC: 85 MG/DL (ref 70–99)
GLUCOSE BLD-MCNC: 88 MG/DL (ref 70–99)
GLUCOSE BLD-MCNC: 88 MG/DL (ref 70–99)
GLUCOSE BLD-MCNC: 90 MG/DL (ref 70–99)
GLUCOSE BLD-MCNC: 91 MG/DL (ref 70–99)
GLUCOSE BLD-MCNC: 92 MG/DL (ref 70–99)
GLUCOSE BLD-MCNC: 99 MG/DL (ref 70–99)
GLUCOSE UR STRIP.AUTO-MCNC: 50 MG/DL
GLUCOSE UR STRIP.AUTO-MCNC: >=500 MG/DL
HBA1C MFR BLD: 13.1 % (ref ?–5.7)
HCO3 BLDA-SCNC: 26.4 MEQ/L (ref 22–26)
HCO3 BLDA-SCNC: 26.6 MEQ/L (ref 22–26)
HCO3 BLDA-SCNC: 28.3 MEQ/L (ref 22–26)
HCO3 BLDA-SCNC: 28.5 MEQ/L (ref 22–26)
HCO3 BLDA-SCNC: 28.6 MEQ/L (ref 22–26)
HCO3 BLDA-SCNC: 30.6 MEQ/L (ref 22–26)
HCO3 BLDA-SCNC: 30.6 MEQ/L (ref 22–26)
HCO3 BLDA-SCNC: 33.1 MEQ/L (ref 22–26)
HCO3 BLDA-SCNC: 34.7 MEQ/L (ref 22–26)
HCO3 BLDA-SCNC: 36.5 MEQ/L (ref 22–26)
HCO3 BLDA-SCNC: 38.9 MEQ/L (ref 22–26)
HCO3 BLDA-SCNC: 39.2 MEQ/L (ref 22–26)
HCO3 BLDA-SCNC: 41.9 MEQ/L (ref 22–26)
HCT VFR BLD AUTO: 33.4 %
HCT VFR BLD AUTO: 34.3 %
HCT VFR BLD AUTO: 35.5 %
HCT VFR BLD AUTO: 36.3 %
HCT VFR BLD AUTO: 37.6 %
HCT VFR BLD AUTO: 38.9 %
HCT VFR BLD AUTO: 39.6 %
HCT VFR BLD AUTO: 39.8 %
HCT VFR BLD AUTO: 40.2 %
HCT VFR BLD AUTO: 40.9 %
HCT VFR BLD AUTO: 41.2 %
HCT VFR BLD AUTO: 41.7 %
HCT VFR BLD AUTO: 41.9 %
HCT VFR BLD AUTO: 42.4 %
HCT VFR BLD AUTO: 42.9 %
HCT VFR BLD AUTO: 43.2 %
HCT VFR BLD AUTO: 43.4 %
HCT VFR BLD AUTO: 43.6 %
HCT VFR BLD AUTO: 44.2 %
HCT VFR BLD AUTO: 44.3 %
HCT VFR BLD AUTO: 44.8 %
HCT VFR BLD AUTO: 47.1 %
HCT VFR BLD AUTO: 47.6 %
HCT VFR BLD AUTO: 48.4 %
HCT VFR BLD AUTO: 49 %
HCT VFR BLD AUTO: 49.5 %
HGB BLD-MCNC: 10.5 G/DL
HGB BLD-MCNC: 11.3 G/DL
HGB BLD-MCNC: 11.4 G/DL
HGB BLD-MCNC: 11.6 G/DL
HGB BLD-MCNC: 11.8 G/DL
HGB BLD-MCNC: 11.9 G/DL
HGB BLD-MCNC: 12.3 G/DL
HGB BLD-MCNC: 12.4 G/DL
HGB BLD-MCNC: 12.5 G/DL
HGB BLD-MCNC: 12.7 G/DL
HGB BLD-MCNC: 12.9 G/DL
HGB BLD-MCNC: 13 G/DL
HGB BLD-MCNC: 13.3 G/DL
HGB BLD-MCNC: 13.4 G/DL
HGB BLD-MCNC: 13.5 G/DL
HGB BLD-MCNC: 13.7 G/DL
HGB BLD-MCNC: 13.8 G/DL
HGB BLD-MCNC: 13.8 G/DL
HGB BLD-MCNC: 13.9 G/DL
HGB BLD-MCNC: 14.2 G/DL
HGB BLD-MCNC: 14.3 G/DL
HGB BLD-MCNC: 14.5 G/DL
HGB BLD-MCNC: 14.6 G/DL
HGB BLD-MCNC: 14.6 G/DL
HGB BLD-MCNC: 14.8 G/DL
HGB BLD-MCNC: 15 G/DL
HGB BLD-MCNC: 15.1 G/DL
HGB BLD-MCNC: 15.2 G/DL
HGB BLD-MCNC: 15.3 G/DL
HGB BLD-MCNC: 15.6 G/DL
HGB BLD-MCNC: 16 G/DL
HGB BLD-MCNC: 16.5 G/DL
HGB BLD-MCNC: 16.6 G/DL
HGB BLD-MCNC: 16.9 G/DL
HGB BLD-MCNC: 17.4 G/DL
HYALINE CASTS #/AREA URNS AUTO: PRESENT /LPF
HYALINE CASTS #/AREA URNS AUTO: PRESENT /LPF
IMM GRANULOCYTES # BLD AUTO: 0.01 X10(3) UL (ref 0–1)
IMM GRANULOCYTES # BLD AUTO: 0.01 X10(3) UL (ref 0–1)
IMM GRANULOCYTES # BLD AUTO: 0.02 X10(3) UL (ref 0–1)
IMM GRANULOCYTES # BLD AUTO: 0.03 X10(3) UL (ref 0–1)
IMM GRANULOCYTES # BLD AUTO: 0.04 X10(3) UL (ref 0–1)
IMM GRANULOCYTES # BLD AUTO: 0.04 X10(3) UL (ref 0–1)
IMM GRANULOCYTES # BLD AUTO: 0.05 X10(3) UL (ref 0–1)
IMM GRANULOCYTES # BLD AUTO: 0.06 X10(3) UL (ref 0–1)
IMM GRANULOCYTES # BLD AUTO: 0.06 X10(3) UL (ref 0–1)
IMM GRANULOCYTES # BLD AUTO: 0.07 X10(3) UL (ref 0–1)
IMM GRANULOCYTES # BLD AUTO: 0.08 X10(3) UL (ref 0–1)
IMM GRANULOCYTES # BLD AUTO: 0.08 X10(3) UL (ref 0–1)
IMM GRANULOCYTES # BLD AUTO: 0.09 X10(3) UL (ref 0–1)
IMM GRANULOCYTES # BLD AUTO: 0.11 X10(3) UL (ref 0–1)
IMM GRANULOCYTES # BLD AUTO: 0.11 X10(3) UL (ref 0–1)
IMM GRANULOCYTES # BLD AUTO: 0.14 X10(3) UL (ref 0–1)
IMM GRANULOCYTES # BLD AUTO: 0.17 X10(3) UL (ref 0–1)
IMM GRANULOCYTES # BLD AUTO: 0.2 X10(3) UL (ref 0–1)
IMM GRANULOCYTES NFR BLD: 0.2 %
IMM GRANULOCYTES NFR BLD: 0.3 %
IMM GRANULOCYTES NFR BLD: 0.4 %
IMM GRANULOCYTES NFR BLD: 0.5 %
IMM GRANULOCYTES NFR BLD: 0.6 %
IMM GRANULOCYTES NFR BLD: 0.7 %
IMM GRANULOCYTES NFR BLD: 0.7 %
IMM GRANULOCYTES NFR BLD: 0.8 %
IMM GRANULOCYTES NFR BLD: 1 %
IMM GRANULOCYTES NFR BLD: 1.2 %
IMM GRANULOCYTES NFR BLD: 1.2 %
IMM GRANULOCYTES NFR BLD: 1.3 %
IMM GRANULOCYTES NFR BLD: 1.9 %
IMM GRANULOCYTES NFR BLD: 2.7 %
KETONES UR STRIP.AUTO-MCNC: 20 MG/DL
LACTATE BLDA-SCNC: <1.6 MMOL/L (ref 0.5–2)
LACTATE BLDA-SCNC: <1.6 MMOL/L (ref 0.5–2)
LDH SERPL L TO P-CCNC: 284 U/L
LDH SERPL L TO P-CCNC: 288 U/L
LDH SERPL L TO P-CCNC: 296 U/L
LDH SERPL L TO P-CCNC: 302 U/L
LDH SERPL L TO P-CCNC: 308 U/L
LDH SERPL L TO P-CCNC: 318 U/L
LDH SERPL L TO P-CCNC: 342 U/L
LDH SERPL L TO P-CCNC: 355 U/L
LDH SERPL L TO P-CCNC: 385 U/L
LDH SERPL L TO P-CCNC: 405 U/L
LDH SERPL L TO P-CCNC: 459 U/L
LDH SERPL L TO P-CCNC: 482 U/L
LDH SERPL L TO P-CCNC: 506 U/L
LEUKOCYTE ESTERASE UR QL STRIP.AUTO: NEGATIVE
LEUKOCYTE ESTERASE UR QL STRIP.AUTO: NEGATIVE
LYMPHOCYTES # BLD AUTO: 0.51 X10(3) UL (ref 1–4)
LYMPHOCYTES # BLD AUTO: 0.65 X10(3) UL (ref 1–4)
LYMPHOCYTES # BLD AUTO: 0.83 X10(3) UL (ref 1–4)
LYMPHOCYTES # BLD AUTO: 0.83 X10(3) UL (ref 1–4)
LYMPHOCYTES # BLD AUTO: 0.9 X10(3) UL (ref 1–4)
LYMPHOCYTES # BLD AUTO: 0.92 X10(3) UL (ref 1–4)
LYMPHOCYTES # BLD AUTO: 0.92 X10(3) UL (ref 1–4)
LYMPHOCYTES # BLD AUTO: 1 X10(3) UL (ref 1–4)
LYMPHOCYTES # BLD AUTO: 1 X10(3) UL (ref 1–4)
LYMPHOCYTES # BLD AUTO: 1.02 X10(3) UL (ref 1–4)
LYMPHOCYTES # BLD AUTO: 1.02 X10(3) UL (ref 1–4)
LYMPHOCYTES # BLD AUTO: 1.04 X10(3) UL (ref 1–4)
LYMPHOCYTES # BLD AUTO: 1.08 X10(3) UL (ref 1–4)
LYMPHOCYTES # BLD AUTO: 1.12 X10(3) UL (ref 1–4)
LYMPHOCYTES # BLD AUTO: 1.25 X10(3) UL (ref 1–4)
LYMPHOCYTES # BLD AUTO: 1.44 X10(3) UL (ref 1–4)
LYMPHOCYTES # BLD AUTO: 1.45 X10(3) UL (ref 1–4)
LYMPHOCYTES # BLD AUTO: 1.51 X10(3) UL (ref 1–4)
LYMPHOCYTES # BLD AUTO: 1.57 X10(3) UL (ref 1–4)
LYMPHOCYTES # BLD AUTO: 1.61 X10(3) UL (ref 1–4)
LYMPHOCYTES # BLD AUTO: 1.64 X10(3) UL (ref 1–4)
LYMPHOCYTES # BLD AUTO: 1.65 X10(3) UL (ref 1–4)
LYMPHOCYTES # BLD AUTO: 1.66 X10(3) UL (ref 1–4)
LYMPHOCYTES # BLD AUTO: 1.66 X10(3) UL (ref 1–4)
LYMPHOCYTES # BLD AUTO: 1.73 X10(3) UL (ref 1–4)
LYMPHOCYTES # BLD AUTO: 2.07 X10(3) UL (ref 1–4)
LYMPHOCYTES NFR BLD AUTO: 11.2 %
LYMPHOCYTES NFR BLD AUTO: 11.2 %
LYMPHOCYTES NFR BLD AUTO: 12 %
LYMPHOCYTES NFR BLD AUTO: 12.1 %
LYMPHOCYTES NFR BLD AUTO: 12.7 %
LYMPHOCYTES NFR BLD AUTO: 13 %
LYMPHOCYTES NFR BLD AUTO: 13.8 %
LYMPHOCYTES NFR BLD AUTO: 14.1 %
LYMPHOCYTES NFR BLD AUTO: 14.2 %
LYMPHOCYTES NFR BLD AUTO: 14.9 %
LYMPHOCYTES NFR BLD AUTO: 16 %
LYMPHOCYTES NFR BLD AUTO: 16.4 %
LYMPHOCYTES NFR BLD AUTO: 16.4 %
LYMPHOCYTES NFR BLD AUTO: 16.5 %
LYMPHOCYTES NFR BLD AUTO: 17.1 %
LYMPHOCYTES NFR BLD AUTO: 18.2 %
LYMPHOCYTES NFR BLD AUTO: 19.1 %
LYMPHOCYTES NFR BLD AUTO: 19.9 %
LYMPHOCYTES NFR BLD AUTO: 20.1 %
LYMPHOCYTES NFR BLD AUTO: 22.9 %
LYMPHOCYTES NFR BLD AUTO: 23.3 %
LYMPHOCYTES NFR BLD AUTO: 5.1 %
LYMPHOCYTES NFR BLD AUTO: 7.1 %
LYMPHOCYTES NFR BLD AUTO: 8.1 %
LYMPHOCYTES NFR BLD AUTO: 8.7 %
LYMPHOCYTES NFR BLD AUTO: 9.4 %
MAGNESIUM SERPL-MCNC: 1.7 MG/DL (ref 1.6–2.6)
MAGNESIUM SERPL-MCNC: 1.8 MG/DL (ref 1.6–2.6)
MAGNESIUM SERPL-MCNC: 1.8 MG/DL (ref 1.6–2.6)
MAGNESIUM SERPL-MCNC: 1.9 MG/DL (ref 1.6–2.6)
MAGNESIUM SERPL-MCNC: 1.9 MG/DL (ref 1.6–2.6)
MAGNESIUM SERPL-MCNC: 2 MG/DL (ref 1.6–2.6)
MAGNESIUM SERPL-MCNC: 2.1 MG/DL (ref 1.6–2.6)
MAGNESIUM SERPL-MCNC: 2.2 MG/DL (ref 1.6–2.6)
MAGNESIUM SERPL-MCNC: 2.3 MG/DL (ref 1.6–2.6)
MAGNESIUM SERPL-MCNC: 2.6 MG/DL (ref 1.6–2.6)
MCH RBC QN AUTO: 28.9 PG (ref 26–34)
MCH RBC QN AUTO: 29.2 PG (ref 26–34)
MCH RBC QN AUTO: 29.2 PG (ref 26–34)
MCH RBC QN AUTO: 29.3 PG (ref 26–34)
MCH RBC QN AUTO: 29.3 PG (ref 26–34)
MCH RBC QN AUTO: 29.4 PG (ref 26–34)
MCH RBC QN AUTO: 29.6 PG (ref 26–34)
MCH RBC QN AUTO: 29.6 PG (ref 26–34)
MCH RBC QN AUTO: 29.7 PG (ref 26–34)
MCH RBC QN AUTO: 29.7 PG (ref 26–34)
MCH RBC QN AUTO: 29.8 PG (ref 26–34)
MCH RBC QN AUTO: 29.9 PG (ref 26–34)
MCH RBC QN AUTO: 30.1 PG (ref 26–34)
MCH RBC QN AUTO: 30.2 PG (ref 26–34)
MCH RBC QN AUTO: 30.2 PG (ref 26–34)
MCH RBC QN AUTO: 30.4 PG (ref 26–34)
MCH RBC QN AUTO: 30.4 PG (ref 26–34)
MCH RBC QN AUTO: 30.5 PG (ref 26–34)
MCH RBC QN AUTO: 30.7 PG (ref 26–34)
MCH RBC QN AUTO: 30.8 PG (ref 26–34)
MCH RBC QN AUTO: 30.9 PG (ref 26–34)
MCH RBC QN AUTO: 31 PG (ref 26–34)
MCHC RBC AUTO-ENTMCNC: 31.4 G/DL (ref 31–37)
MCHC RBC AUTO-ENTMCNC: 31.9 G/DL (ref 31–37)
MCHC RBC AUTO-ENTMCNC: 32.5 G/DL (ref 31–37)
MCHC RBC AUTO-ENTMCNC: 32.7 G/DL (ref 31–37)
MCHC RBC AUTO-ENTMCNC: 32.9 G/DL (ref 31–37)
MCHC RBC AUTO-ENTMCNC: 32.9 G/DL (ref 31–37)
MCHC RBC AUTO-ENTMCNC: 33 G/DL (ref 31–37)
MCHC RBC AUTO-ENTMCNC: 33.1 G/DL (ref 31–37)
MCHC RBC AUTO-ENTMCNC: 33.5 G/DL (ref 31–37)
MCHC RBC AUTO-ENTMCNC: 33.5 G/DL (ref 31–37)
MCHC RBC AUTO-ENTMCNC: 33.6 G/DL (ref 31–37)
MCHC RBC AUTO-ENTMCNC: 33.7 G/DL (ref 31–37)
MCHC RBC AUTO-ENTMCNC: 33.8 G/DL (ref 31–37)
MCHC RBC AUTO-ENTMCNC: 33.9 G/DL (ref 31–37)
MCHC RBC AUTO-ENTMCNC: 33.9 G/DL (ref 31–37)
MCHC RBC AUTO-ENTMCNC: 34.1 G/DL (ref 31–37)
MCHC RBC AUTO-ENTMCNC: 34.3 G/DL (ref 31–37)
MCHC RBC AUTO-ENTMCNC: 34.4 G/DL (ref 31–37)
MCHC RBC AUTO-ENTMCNC: 35.2 G/DL (ref 31–37)
MCHC RBC AUTO-ENTMCNC: 35.5 G/DL (ref 31–37)
MCHC RBC AUTO-ENTMCNC: 35.7 G/DL (ref 31–37)
MCHC RBC AUTO-ENTMCNC: 35.9 G/DL (ref 31–37)
MCHC RBC AUTO-ENTMCNC: 36.1 G/DL (ref 31–37)
MCHC RBC AUTO-ENTMCNC: 36.2 G/DL (ref 31–37)
MCV RBC AUTO: 85.3 FL
MCV RBC AUTO: 85.6 FL
MCV RBC AUTO: 85.9 FL
MCV RBC AUTO: 86.4 FL
MCV RBC AUTO: 86.7 FL
MCV RBC AUTO: 86.9 FL
MCV RBC AUTO: 87.2 FL
MCV RBC AUTO: 87.8 FL
MCV RBC AUTO: 87.9 FL
MCV RBC AUTO: 88.5 FL
MCV RBC AUTO: 88.6 FL
MCV RBC AUTO: 88.7 FL
MCV RBC AUTO: 88.7 FL
MCV RBC AUTO: 89 FL
MCV RBC AUTO: 90 FL
MCV RBC AUTO: 90.3 FL
MCV RBC AUTO: 90.8 FL
MCV RBC AUTO: 91.2 FL
MCV RBC AUTO: 91.7 FL
MCV RBC AUTO: 92 FL
MCV RBC AUTO: 92 FL
MCV RBC AUTO: 92.1 FL
METAPNEUMOVIRUS PCR:: NOT DETECTED
METHGB MFR BLD: 0.5 % SAT (ref 0.4–1.5)
METHGB MFR BLD: 0.6 % SAT (ref 0.4–1.5)
METHGB MFR BLD: 0.7 % SAT (ref 0.4–1.5)
METHGB MFR BLD: 0.8 % SAT (ref 0.4–1.5)
MONOCYTES # BLD AUTO: 0.16 X10(3) UL (ref 0.1–1)
MONOCYTES # BLD AUTO: 0.39 X10(3) UL (ref 0.1–1)
MONOCYTES # BLD AUTO: 0.44 X10(3) UL (ref 0.1–1)
MONOCYTES # BLD AUTO: 0.5 X10(3) UL (ref 0.1–1)
MONOCYTES # BLD AUTO: 0.6 X10(3) UL (ref 0.1–1)
MONOCYTES # BLD AUTO: 0.61 X10(3) UL (ref 0.1–1)
MONOCYTES # BLD AUTO: 0.67 X10(3) UL (ref 0.1–1)
MONOCYTES # BLD AUTO: 0.73 X10(3) UL (ref 0.1–1)
MONOCYTES # BLD AUTO: 0.75 X10(3) UL (ref 0.1–1)
MONOCYTES # BLD AUTO: 0.78 X10(3) UL (ref 0.1–1)
MONOCYTES # BLD AUTO: 0.8 X10(3) UL (ref 0.1–1)
MONOCYTES # BLD AUTO: 0.82 X10(3) UL (ref 0.1–1)
MONOCYTES # BLD AUTO: 0.87 X10(3) UL (ref 0.1–1)
MONOCYTES # BLD AUTO: 0.88 X10(3) UL (ref 0.1–1)
MONOCYTES # BLD AUTO: 0.91 X10(3) UL (ref 0.1–1)
MONOCYTES # BLD AUTO: 0.92 X10(3) UL (ref 0.1–1)
MONOCYTES # BLD AUTO: 0.96 X10(3) UL (ref 0.1–1)
MONOCYTES # BLD AUTO: 1.03 X10(3) UL (ref 0.1–1)
MONOCYTES # BLD AUTO: 1.04 X10(3) UL (ref 0.1–1)
MONOCYTES # BLD AUTO: 1.1 X10(3) UL (ref 0.1–1)
MONOCYTES # BLD AUTO: 1.36 X10(3) UL (ref 0.1–1)
MONOCYTES NFR BLD AUTO: 10.1 %
MONOCYTES NFR BLD AUTO: 10.3 %
MONOCYTES NFR BLD AUTO: 10.5 %
MONOCYTES NFR BLD AUTO: 10.7 %
MONOCYTES NFR BLD AUTO: 11.1 %
MONOCYTES NFR BLD AUTO: 11.5 %
MONOCYTES NFR BLD AUTO: 11.7 %
MONOCYTES NFR BLD AUTO: 12 %
MONOCYTES NFR BLD AUTO: 12.2 %
MONOCYTES NFR BLD AUTO: 4.4 %
MONOCYTES NFR BLD AUTO: 4.7 %
MONOCYTES NFR BLD AUTO: 5.1 %
MONOCYTES NFR BLD AUTO: 5.2 %
MONOCYTES NFR BLD AUTO: 5.6 %
MONOCYTES NFR BLD AUTO: 7 %
MONOCYTES NFR BLD AUTO: 7.1 %
MONOCYTES NFR BLD AUTO: 7.2 %
MONOCYTES NFR BLD AUTO: 7.6 %
MONOCYTES NFR BLD AUTO: 7.9 %
MONOCYTES NFR BLD AUTO: 8.2 %
MONOCYTES NFR BLD AUTO: 8.2 %
MONOCYTES NFR BLD AUTO: 8.6 %
MONOCYTES NFR BLD AUTO: 9.8 %
MYCOPLASMA PNEUMONIA PCR:: NOT DETECTED
NEUTROPHILS # BLD AUTO: 10.88 X10 (3) UL (ref 1.5–7.7)
NEUTROPHILS # BLD AUTO: 10.88 X10(3) UL (ref 1.5–7.7)
NEUTROPHILS # BLD AUTO: 11.31 X10 (3) UL (ref 1.5–7.7)
NEUTROPHILS # BLD AUTO: 11.31 X10(3) UL (ref 1.5–7.7)
NEUTROPHILS # BLD AUTO: 11.75 X10 (3) UL (ref 1.5–7.7)
NEUTROPHILS # BLD AUTO: 11.75 X10(3) UL (ref 1.5–7.7)
NEUTROPHILS # BLD AUTO: 2.32 X10 (3) UL (ref 1.5–7.7)
NEUTROPHILS # BLD AUTO: 2.32 X10(3) UL (ref 1.5–7.7)
NEUTROPHILS # BLD AUTO: 2.4 X10 (3) UL (ref 1.5–7.7)
NEUTROPHILS # BLD AUTO: 2.4 X10(3) UL (ref 1.5–7.7)
NEUTROPHILS # BLD AUTO: 4.26 X10 (3) UL (ref 1.5–7.7)
NEUTROPHILS # BLD AUTO: 4.26 X10(3) UL (ref 1.5–7.7)
NEUTROPHILS # BLD AUTO: 4.61 X10 (3) UL (ref 1.5–7.7)
NEUTROPHILS # BLD AUTO: 4.61 X10(3) UL (ref 1.5–7.7)
NEUTROPHILS # BLD AUTO: 4.8 X10 (3) UL (ref 1.5–7.7)
NEUTROPHILS # BLD AUTO: 4.8 X10(3) UL (ref 1.5–7.7)
NEUTROPHILS # BLD AUTO: 4.86 X10 (3) UL (ref 1.5–7.7)
NEUTROPHILS # BLD AUTO: 4.86 X10(3) UL (ref 1.5–7.7)
NEUTROPHILS # BLD AUTO: 5.42 X10 (3) UL (ref 1.5–7.7)
NEUTROPHILS # BLD AUTO: 5.42 X10(3) UL (ref 1.5–7.7)
NEUTROPHILS # BLD AUTO: 5.51 X10 (3) UL (ref 1.5–7.7)
NEUTROPHILS # BLD AUTO: 5.51 X10(3) UL (ref 1.5–7.7)
NEUTROPHILS # BLD AUTO: 5.68 X10 (3) UL (ref 1.5–7.7)
NEUTROPHILS # BLD AUTO: 5.68 X10(3) UL (ref 1.5–7.7)
NEUTROPHILS # BLD AUTO: 5.82 X10 (3) UL (ref 1.5–7.7)
NEUTROPHILS # BLD AUTO: 5.82 X10(3) UL (ref 1.5–7.7)
NEUTROPHILS # BLD AUTO: 5.89 X10 (3) UL (ref 1.5–7.7)
NEUTROPHILS # BLD AUTO: 5.89 X10(3) UL (ref 1.5–7.7)
NEUTROPHILS # BLD AUTO: 5.93 X10 (3) UL (ref 1.5–7.7)
NEUTROPHILS # BLD AUTO: 5.93 X10(3) UL (ref 1.5–7.7)
NEUTROPHILS # BLD AUTO: 6.18 X10 (3) UL (ref 1.5–7.7)
NEUTROPHILS # BLD AUTO: 6.18 X10(3) UL (ref 1.5–7.7)
NEUTROPHILS # BLD AUTO: 6.65 X10 (3) UL (ref 1.5–7.7)
NEUTROPHILS # BLD AUTO: 6.65 X10(3) UL (ref 1.5–7.7)
NEUTROPHILS # BLD AUTO: 6.73 X10 (3) UL (ref 1.5–7.7)
NEUTROPHILS # BLD AUTO: 6.73 X10(3) UL (ref 1.5–7.7)
NEUTROPHILS # BLD AUTO: 6.79 X10 (3) UL (ref 1.5–7.7)
NEUTROPHILS # BLD AUTO: 6.79 X10(3) UL (ref 1.5–7.7)
NEUTROPHILS # BLD AUTO: 7.12 X10 (3) UL (ref 1.5–7.7)
NEUTROPHILS # BLD AUTO: 7.12 X10(3) UL (ref 1.5–7.7)
NEUTROPHILS # BLD AUTO: 7.41 X10 (3) UL (ref 1.5–7.7)
NEUTROPHILS # BLD AUTO: 7.41 X10(3) UL (ref 1.5–7.7)
NEUTROPHILS # BLD AUTO: 7.63 X10 (3) UL (ref 1.5–7.7)
NEUTROPHILS # BLD AUTO: 7.63 X10(3) UL (ref 1.5–7.7)
NEUTROPHILS # BLD AUTO: 7.68 X10 (3) UL (ref 1.5–7.7)
NEUTROPHILS # BLD AUTO: 7.68 X10(3) UL (ref 1.5–7.7)
NEUTROPHILS # BLD AUTO: 8.64 X10 (3) UL (ref 1.5–7.7)
NEUTROPHILS # BLD AUTO: 8.64 X10(3) UL (ref 1.5–7.7)
NEUTROPHILS # BLD AUTO: 9.09 X10 (3) UL (ref 1.5–7.7)
NEUTROPHILS # BLD AUTO: 9.09 X10(3) UL (ref 1.5–7.7)
NEUTROPHILS # BLD AUTO: 9.87 X10 (3) UL (ref 1.5–7.7)
NEUTROPHILS # BLD AUTO: 9.87 X10(3) UL (ref 1.5–7.7)
NEUTROPHILS NFR BLD AUTO: 57.4 %
NEUTROPHILS NFR BLD AUTO: 63.1 %
NEUTROPHILS NFR BLD AUTO: 63.4 %
NEUTROPHILS NFR BLD AUTO: 63.9 %
NEUTROPHILS NFR BLD AUTO: 64 %
NEUTROPHILS NFR BLD AUTO: 65.6 %
NEUTROPHILS NFR BLD AUTO: 66.6 %
NEUTROPHILS NFR BLD AUTO: 67.9 %
NEUTROPHILS NFR BLD AUTO: 70.4 %
NEUTROPHILS NFR BLD AUTO: 71.1 %
NEUTROPHILS NFR BLD AUTO: 72.1 %
NEUTROPHILS NFR BLD AUTO: 72.2 %
NEUTROPHILS NFR BLD AUTO: 73.3 %
NEUTROPHILS NFR BLD AUTO: 73.6 %
NEUTROPHILS NFR BLD AUTO: 74.3 %
NEUTROPHILS NFR BLD AUTO: 75.3 %
NEUTROPHILS NFR BLD AUTO: 77.7 %
NEUTROPHILS NFR BLD AUTO: 78 %
NEUTROPHILS NFR BLD AUTO: 80.4 %
NEUTROPHILS NFR BLD AUTO: 80.8 %
NEUTROPHILS NFR BLD AUTO: 81.1 %
NEUTROPHILS NFR BLD AUTO: 81.7 %
NEUTROPHILS NFR BLD AUTO: 82.4 %
NEUTROPHILS NFR BLD AUTO: 88.6 %
NITRITE UR QL STRIP.AUTO: NEGATIVE
NITRITE UR QL STRIP.AUTO: NEGATIVE
NT-PROBNP SERPL-MCNC: 61 PG/ML (ref ?–125)
NT-PROBNP SERPL-MCNC: 95 PG/ML (ref ?–125)
OSMOLALITY SERPL CALC.SUM OF ELEC: 278 MOSM/KG (ref 275–295)
OSMOLALITY SERPL CALC.SUM OF ELEC: 280 MOSM/KG (ref 275–295)
OSMOLALITY SERPL CALC.SUM OF ELEC: 281 MOSM/KG (ref 275–295)
OSMOLALITY SERPL CALC.SUM OF ELEC: 283 MOSM/KG (ref 275–295)
OSMOLALITY SERPL CALC.SUM OF ELEC: 284 MOSM/KG (ref 275–295)
OSMOLALITY SERPL CALC.SUM OF ELEC: 284 MOSM/KG (ref 275–295)
OSMOLALITY SERPL CALC.SUM OF ELEC: 285 MOSM/KG (ref 275–295)
OSMOLALITY SERPL CALC.SUM OF ELEC: 285 MOSM/KG (ref 275–295)
OSMOLALITY SERPL CALC.SUM OF ELEC: 286 MOSM/KG (ref 275–295)
OSMOLALITY SERPL CALC.SUM OF ELEC: 287 MOSM/KG (ref 275–295)
OSMOLALITY SERPL CALC.SUM OF ELEC: 287 MOSM/KG (ref 275–295)
OSMOLALITY SERPL CALC.SUM OF ELEC: 288 MOSM/KG (ref 275–295)
OSMOLALITY SERPL CALC.SUM OF ELEC: 289 MOSM/KG (ref 275–295)
OSMOLALITY SERPL CALC.SUM OF ELEC: 289 MOSM/KG (ref 275–295)
OSMOLALITY SERPL CALC.SUM OF ELEC: 290 MOSM/KG (ref 275–295)
OSMOLALITY SERPL CALC.SUM OF ELEC: 292 MOSM/KG (ref 275–295)
OSMOLALITY SERPL CALC.SUM OF ELEC: 293 MOSM/KG (ref 275–295)
OSMOLALITY SERPL CALC.SUM OF ELEC: 294 MOSM/KG (ref 275–295)
OSMOLALITY SERPL CALC.SUM OF ELEC: 296 MOSM/KG (ref 275–295)
OSMOLALITY SERPL CALC.SUM OF ELEC: 296 MOSM/KG (ref 275–295)
OSMOLALITY SERPL CALC.SUM OF ELEC: 299 MOSM/KG (ref 275–295)
OSMOLALITY SERPL CALC.SUM OF ELEC: 301 MOSM/KG (ref 275–295)
OSMOLALITY SERPL CALC.SUM OF ELEC: 307 MOSM/KG (ref 275–295)
P AXIS: 24 DEGREES
P-R INTERVAL: 160 MS
P/F RATIO: 122.4 MMHG
P/F RATIO: 77.6 MMHG
P/F RATIO: 79.7 MMHG
P/F RATIO: 81 MMHG
P/F RATIO: 81.3 MMHG
P/F RATIO: 87.8 MMHG
P/F RATIO: 90.8 MMHG
P/F RATIO: 91.8 MMHG
PARAINFLUENZA 1 PCR:: NOT DETECTED
PARAINFLUENZA 2 PCR:: NOT DETECTED
PARAINFLUENZA 3 PCR:: NOT DETECTED
PARAINFLUENZA 4 PCR:: NOT DETECTED
PCO2 BLDA: 41 MM HG (ref 35–45)
PCO2 BLDA: 51 MM HG (ref 35–45)
PCO2 BLDA: 55 MM HG (ref 35–45)
PCO2 BLDA: 56 MM HG (ref 35–45)
PCO2 BLDA: 57 MM HG (ref 35–45)
PCO2 BLDA: 59 MM HG (ref 35–45)
PCO2 BLDA: 60 MM HG (ref 35–45)
PCO2 BLDA: 61 MM HG (ref 35–45)
PCO2 BLDA: 65 MM HG (ref 35–45)
PCO2 BLDA: 71 MM HG (ref 35–45)
PCO2 BLDA: 71 MM HG (ref 35–45)
PEEP: 10 CM H2O
PEEP: 7 CM H2O
PH BLDA: 7.29 [PH] (ref 7.35–7.45)
PH BLDA: 7.3 [PH] (ref 7.35–7.45)
PH BLDA: 7.3 [PH] (ref 7.35–7.45)
PH BLDA: 7.33 [PH] (ref 7.35–7.45)
PH BLDA: 7.33 [PH] (ref 7.35–7.45)
PH BLDA: 7.36 [PH] (ref 7.35–7.45)
PH BLDA: 7.37 [PH] (ref 7.35–7.45)
PH BLDA: 7.38 [PH] (ref 7.35–7.45)
PH BLDA: 7.38 [PH] (ref 7.35–7.45)
PH BLDA: 7.39 [PH] (ref 7.35–7.45)
PH BLDA: 7.4 [PH] (ref 7.35–7.45)
PH BLDA: 7.43 [PH] (ref 7.35–7.45)
PH BLDA: 7.46 [PH] (ref 7.35–7.45)
PH UR STRIP.AUTO: 5 [PH] (ref 5–8)
PH UR STRIP.AUTO: 5 [PH] (ref 5–8)
PHOSPHATE SERPL-MCNC: 1.6 MG/DL (ref 2.5–4.9)
PHOSPHATE SERPL-MCNC: 1.8 MG/DL (ref 2.5–4.9)
PHOSPHATE SERPL-MCNC: 2.1 MG/DL (ref 2.5–4.9)
PHOSPHATE SERPL-MCNC: 2.2 MG/DL (ref 2.5–4.9)
PHOSPHATE SERPL-MCNC: 2.3 MG/DL (ref 2.5–4.9)
PHOSPHATE SERPL-MCNC: 2.3 MG/DL (ref 2.5–4.9)
PHOSPHATE SERPL-MCNC: 2.6 MG/DL (ref 2.5–4.9)
PHOSPHATE SERPL-MCNC: 2.6 MG/DL (ref 2.5–4.9)
PHOSPHATE SERPL-MCNC: 2.7 MG/DL (ref 2.5–4.9)
PHOSPHATE SERPL-MCNC: 2.8 MG/DL (ref 2.5–4.9)
PHOSPHATE SERPL-MCNC: 2.9 MG/DL (ref 2.5–4.9)
PHOSPHATE SERPL-MCNC: 3.2 MG/DL (ref 2.5–4.9)
PHOSPHATE SERPL-MCNC: 3.3 MG/DL (ref 2.5–4.9)
PHOSPHATE SERPL-MCNC: 3.5 MG/DL (ref 2.5–4.9)
PHOSPHATE SERPL-MCNC: 3.6 MG/DL (ref 2.5–4.9)
PHOSPHATE SERPL-MCNC: 4.3 MG/DL (ref 2.5–4.9)
PLATELET # BLD AUTO: 127 10(3)UL (ref 150–450)
PLATELET # BLD AUTO: 135 10(3)UL (ref 150–450)
PLATELET # BLD AUTO: 179 10(3)UL (ref 150–450)
PLATELET # BLD AUTO: 218 10(3)UL (ref 150–450)
PLATELET # BLD AUTO: 282 10(3)UL (ref 150–450)
PLATELET # BLD AUTO: 295 10(3)UL (ref 150–450)
PLATELET # BLD AUTO: 302 10(3)UL (ref 150–450)
PLATELET # BLD AUTO: 302 10(3)UL (ref 150–450)
PLATELET # BLD AUTO: 309 10(3)UL (ref 150–450)
PLATELET # BLD AUTO: 309 10(3)UL (ref 150–450)
PLATELET # BLD AUTO: 310 10(3)UL (ref 150–450)
PLATELET # BLD AUTO: 311 10(3)UL (ref 150–450)
PLATELET # BLD AUTO: 316 10(3)UL (ref 150–450)
PLATELET # BLD AUTO: 324 10(3)UL (ref 150–450)
PLATELET # BLD AUTO: 330 10(3)UL (ref 150–450)
PLATELET # BLD AUTO: 345 10(3)UL (ref 150–450)
PLATELET # BLD AUTO: 363 10(3)UL (ref 150–450)
PLATELET # BLD AUTO: 371 10(3)UL (ref 150–450)
PLATELET # BLD AUTO: 376 10(3)UL (ref 150–450)
PLATELET # BLD AUTO: 378 10(3)UL (ref 150–450)
PLATELET # BLD AUTO: 390 10(3)UL (ref 150–450)
PLATELET # BLD AUTO: 390 10(3)UL (ref 150–450)
PLATELET # BLD AUTO: 396 10(3)UL (ref 150–450)
PLATELET # BLD AUTO: 417 10(3)UL (ref 150–450)
PLATELET # BLD AUTO: 439 10(3)UL (ref 150–450)
PLATELET # BLD AUTO: 455 10(3)UL (ref 150–450)
PO2 BLDA: 41 MM HG (ref 80–105)
PO2 BLDA: 55 MM HG (ref 80–105)
PO2 BLDA: 58 MM HG (ref 80–105)
PO2 BLDA: 62 MM HG (ref 80–105)
PO2 BLDA: 70 MM HG (ref 80–105)
PO2 BLDA: 70 MM HG (ref 80–105)
PO2 BLDA: 74 MM HG (ref 80–105)
PO2 BLDA: 79 MM HG (ref 80–105)
PO2 BLDA: 81 MM HG (ref 80–105)
PO2 BLDA: 81 MM HG (ref 80–105)
PO2 BLDA: 82 MM HG (ref 80–105)
POTASSIUM BLD-SCNC: 3.8 MMOL/L (ref 3.6–5.1)
POTASSIUM BLD-SCNC: 5.3 MMOL/L (ref 3.6–5.1)
POTASSIUM SERPL-SCNC: 3.6 MMOL/L (ref 3.5–5.1)
POTASSIUM SERPL-SCNC: 3.9 MMOL/L (ref 3.5–5.1)
POTASSIUM SERPL-SCNC: 4 MMOL/L (ref 3.5–5.1)
POTASSIUM SERPL-SCNC: 4.1 MMOL/L (ref 3.5–5.1)
POTASSIUM SERPL-SCNC: 4.2 MMOL/L (ref 3.5–5.1)
POTASSIUM SERPL-SCNC: 4.2 MMOL/L (ref 3.5–5.1)
POTASSIUM SERPL-SCNC: 4.4 MMOL/L (ref 3.5–5.1)
POTASSIUM SERPL-SCNC: 4.4 MMOL/L (ref 3.5–5.1)
POTASSIUM SERPL-SCNC: 4.5 MMOL/L (ref 3.5–5.1)
POTASSIUM SERPL-SCNC: 4.5 MMOL/L (ref 3.5–5.1)
POTASSIUM SERPL-SCNC: 4.6 MMOL/L (ref 3.5–5.1)
POTASSIUM SERPL-SCNC: 4.7 MMOL/L (ref 3.5–5.1)
POTASSIUM SERPL-SCNC: 4.8 MMOL/L (ref 3.5–5.1)
POTASSIUM SERPL-SCNC: 5.1 MMOL/L (ref 3.5–5.1)
POTASSIUM SERPL-SCNC: 5.3 MMOL/L (ref 3.5–5.1)
PROCALCITONIN SERPL-MCNC: 0.07 NG/ML (ref ?–0.16)
PROCALCITONIN SERPL-MCNC: 0.08 NG/ML (ref ?–0.16)
PROCALCITONIN SERPL-MCNC: 0.1 NG/ML (ref ?–0.16)
PROCALCITONIN SERPL-MCNC: 0.14 NG/ML (ref ?–0.16)
PROCALCITONIN SERPL-MCNC: 0.41 NG/ML (ref ?–0.16)
PROCALCITONIN SERPL-MCNC: 8.7 NG/ML (ref ?–0.16)
PROCALCITONIN SERPL-MCNC: <0.05 NG/ML (ref ?–0.16)
PROT SERPL-MCNC: 5.2 G/DL (ref 6.4–8.2)
PROT SERPL-MCNC: 5.8 G/DL (ref 6.4–8.2)
PROT SERPL-MCNC: 5.8 G/DL (ref 6.4–8.2)
PROT SERPL-MCNC: 5.9 G/DL (ref 6.4–8.2)
PROT SERPL-MCNC: 6 G/DL (ref 6.4–8.2)
PROT SERPL-MCNC: 6 G/DL (ref 6.4–8.2)
PROT SERPL-MCNC: 6.1 G/DL (ref 6.4–8.2)
PROT SERPL-MCNC: 6.1 G/DL (ref 6.4–8.2)
PROT SERPL-MCNC: 6.2 G/DL (ref 6.4–8.2)
PROT SERPL-MCNC: 6.4 G/DL (ref 6.4–8.2)
PROT SERPL-MCNC: 6.4 G/DL (ref 6.4–8.2)
PROT SERPL-MCNC: 6.5 G/DL (ref 6.4–8.2)
PROT SERPL-MCNC: 6.5 G/DL (ref 6.4–8.2)
PROT SERPL-MCNC: 6.6 G/DL (ref 6.4–8.2)
PROT SERPL-MCNC: 6.7 G/DL (ref 6.4–8.2)
PROT SERPL-MCNC: 6.8 G/DL (ref 6.4–8.2)
PROT SERPL-MCNC: 6.9 G/DL (ref 6.4–8.2)
PROT SERPL-MCNC: 7 G/DL (ref 6.4–8.2)
PROT SERPL-MCNC: 7.3 G/DL (ref 6.4–8.2)
PROT SERPL-MCNC: 7.4 G/DL (ref 6.4–8.2)
PROT SERPL-MCNC: 7.8 G/DL (ref 6.4–8.2)
PROT UR STRIP.AUTO-MCNC: 100 MG/DL
PROT UR STRIP.AUTO-MCNC: 30 MG/DL
Q-T INTERVAL: 284 MS
Q-T INTERVAL: 372 MS
QRS DURATION: 100 MS
QRS DURATION: 84 MS
QTC CALCULATION (BEZET): 439 MS
QTC CALCULATION (BEZET): 453 MS
R AXIS: -40 DEGREES
R AXIS: -68 DEGREES
RBC # BLD AUTO: 3.63 X10(6)UL
RBC # BLD AUTO: 3.76 X10(6)UL
RBC # BLD AUTO: 3.86 X10(6)UL
RBC # BLD AUTO: 3.94 X10(6)UL
RBC # BLD AUTO: 4.14 X10(6)UL
RBC # BLD AUTO: 4.24 X10(6)UL
RBC # BLD AUTO: 4.42 X10(6)UL
RBC # BLD AUTO: 4.47 X10(6)UL
RBC # BLD AUTO: 4.54 X10(6)UL
RBC # BLD AUTO: 4.69 X10(6)UL
RBC # BLD AUTO: 4.69 X10(6)UL
RBC # BLD AUTO: 4.71 X10(6)UL
RBC # BLD AUTO: 4.75 X10(6)UL
RBC # BLD AUTO: 4.83 X10(6)UL
RBC # BLD AUTO: 4.91 X10(6)UL
RBC # BLD AUTO: 5.01 X10(6)UL
RBC # BLD AUTO: 5.03 X10(6)UL
RBC # BLD AUTO: 5.04 X10(6)UL
RBC # BLD AUTO: 5.05 X10(6)UL
RBC # BLD AUTO: 5.07 X10(6)UL
RBC # BLD AUTO: 5.18 X10(6)UL
RBC # BLD AUTO: 5.31 X10(6)UL
RBC # BLD AUTO: 5.56 X10(6)UL
RBC # BLD AUTO: 5.57 X10(6)UL
RBC # BLD AUTO: 5.58 X10(6)UL
RBC # BLD AUTO: 5.71 X10(6)UL
RBC UR QL AUTO: NEGATIVE
RBC UR QL AUTO: NEGATIVE
RHINOVIRUS/ENTERO PCR:: NOT DETECTED
RSV RNA SPEC QL NAA+PROBE: NOT DETECTED
SAO2 % BLDA FROM PO2: 69 % (ref 92–100)
SAO2 % BLDA FROM PO2: 84 % (ref 92–100)
SAO2 % BLDA FROM PO2: 92 % (ref 92–100)
SAO2 % BLDA FROM PO2: 92 % (ref 92–100)
SAO2 % BLDA FROM PO2: 93 % (ref 92–100)
SAO2 % BLDA FROM PO2: 93 % (ref 92–100)
SAO2 % BLDA FROM PO2: 94 % (ref 92–100)
SAO2 % BLDA FROM PO2: 95 % (ref 92–100)
SAO2 % BLDA FROM PO2: 95 % (ref 92–100)
SAO2 % BLDA FROM PO2: 96 % (ref 92–100)
SAO2 % BLDA FROM PO2: 96 % (ref 92–100)
SAO2 % BLDA: 65 % (ref 92–100)
SAO2 % BLDA: 82 % (ref 92–100)
SAO2 % BLDA: 91 % (ref 92–100)
SAO2 % BLDA: 91 % (ref 92–100)
SAO2 % BLDA: 92 % (ref 92–100)
SAO2 % BLDA: 92 % (ref 92–100)
SAO2 % BLDA: 93 % (ref 92–100)
SAO2 % BLDA: 94 % (ref 92–100)
SARS-COV-2 RNA NPH QL NAA+NON-PROBE: DETECTED
SARS-COV-2 RNA RESP QL NAA+PROBE: DETECTED
SODIUM BLD-SCNC: 136 MMOL/L (ref 136–144)
SODIUM BLD-SCNC: 138 MMOL/L (ref 136–144)
SODIUM SERPL-SCNC: 129 MMOL/L (ref 136–145)
SODIUM SERPL-SCNC: 131 MMOL/L (ref 136–145)
SODIUM SERPL-SCNC: 134 MMOL/L (ref 136–145)
SODIUM SERPL-SCNC: 135 MMOL/L (ref 136–145)
SODIUM SERPL-SCNC: 136 MMOL/L (ref 136–145)
SODIUM SERPL-SCNC: 137 MMOL/L (ref 136–145)
SODIUM SERPL-SCNC: 138 MMOL/L (ref 136–145)
SODIUM SERPL-SCNC: 138 MMOL/L (ref 136–145)
SODIUM SERPL-SCNC: 139 MMOL/L (ref 136–145)
SODIUM SERPL-SCNC: 140 MMOL/L (ref 136–145)
SODIUM SERPL-SCNC: 142 MMOL/L (ref 136–145)
SP GR UR STRIP.AUTO: 1.02 (ref 1–1.03)
SP GR UR STRIP.AUTO: >1.03 (ref 1–1.03)
T AXIS: 11 DEGREES
T AXIS: 13 DEGREES
TIDAL VOLUME: 325 ML
TIDAL VOLUME: 350 ML
TIDAL VOLUME: 350 ML
TRIGL SERPL-MCNC: 137 MG/DL (ref 30–149)
TRIGL SERPL-MCNC: 219 MG/DL (ref 30–149)
TRIGL SERPL-MCNC: 233 MG/DL (ref 30–149)
TROPONIN I SERPL-MCNC: <0.045 NG/ML (ref ?–0.04)
UROBILINOGEN UR STRIP.AUTO-MCNC: <2 MG/DL
UROBILINOGEN UR STRIP.AUTO-MCNC: <2 MG/DL
VENT RATE: 24 /MIN
VENT RATE: 28 /MIN
VENTRICULAR RATE: 153 BPM
VENTRICULAR RATE: 84 BPM
WBC # BLD AUTO: 10.1 X10(3) UL (ref 4–11)
WBC # BLD AUTO: 10.3 X10(3) UL (ref 4–11)
WBC # BLD AUTO: 10.7 X10(3) UL (ref 4–11)
WBC # BLD AUTO: 11.1 X10(3) UL (ref 4–11)
WBC # BLD AUTO: 12.8 X10(3) UL (ref 4–11)
WBC # BLD AUTO: 13.9 X10(3) UL (ref 4–11)
WBC # BLD AUTO: 14 X10(3) UL (ref 4–11)
WBC # BLD AUTO: 14.4 X10(3) UL (ref 4–11)
WBC # BLD AUTO: 3.1 X10(3) UL (ref 4–11)
WBC # BLD AUTO: 3.6 X10(3) UL (ref 4–11)
WBC # BLD AUTO: 6.5 X10(3) UL (ref 4–11)
WBC # BLD AUTO: 7.2 X10(3) UL (ref 4–11)
WBC # BLD AUTO: 7.3 X10(3) UL (ref 4–11)
WBC # BLD AUTO: 7.4 X10(3) UL (ref 4–11)
WBC # BLD AUTO: 7.8 X10(3) UL (ref 4–11)
WBC # BLD AUTO: 7.9 X10(3) UL (ref 4–11)
WBC # BLD AUTO: 8.2 X10(3) UL (ref 4–11)
WBC # BLD AUTO: 8.3 X10(3) UL (ref 4–11)
WBC # BLD AUTO: 8.6 X10(3) UL (ref 4–11)
WBC # BLD AUTO: 8.6 X10(3) UL (ref 4–11)
WBC # BLD AUTO: 8.8 X10(3) UL (ref 4–11)
WBC # BLD AUTO: 9.1 X10(3) UL (ref 4–11)
WBC # BLD AUTO: 9.6 X10(3) UL (ref 4–11)
WBC # BLD AUTO: 9.8 X10(3) UL (ref 4–11)

## 2021-01-01 PROCEDURE — 71045 X-RAY EXAM CHEST 1 VIEW: CPT | Performed by: INTERNAL MEDICINE

## 2021-01-01 PROCEDURE — XW033E5 INTRODUCTION OF REMDESIVIR ANTI-INFECTIVE INTO PERIPHERAL VEIN, PERCUTANEOUS APPROACH, NEW TECHNOLOGY GROUP 5: ICD-10-PCS | Performed by: INTERNAL MEDICINE

## 2021-01-01 PROCEDURE — 99233 SBSQ HOSP IP/OBS HIGH 50: CPT | Performed by: HOSPITALIST

## 2021-01-01 PROCEDURE — 99232 SBSQ HOSP IP/OBS MODERATE 35: CPT | Performed by: HOSPITALIST

## 2021-01-01 PROCEDURE — 3E0333Z INTRODUCTION OF ANTI-INFLAMMATORY INTO PERIPHERAL VEIN, PERCUTANEOUS APPROACH: ICD-10-PCS | Performed by: EMERGENCY MEDICINE

## 2021-01-01 PROCEDURE — 99232 SBSQ HOSP IP/OBS MODERATE 35: CPT | Performed by: INTERNAL MEDICINE

## 2021-01-01 PROCEDURE — 93970 EXTREMITY STUDY: CPT | Performed by: NURSE PRACTITIONER

## 2021-01-01 PROCEDURE — 90792 PSYCH DIAG EVAL W/MED SRVCS: CPT | Performed by: OTHER

## 2021-01-01 PROCEDURE — 99291 CRITICAL CARE FIRST HOUR: CPT | Performed by: INTERNAL MEDICINE

## 2021-01-01 PROCEDURE — 99223 1ST HOSP IP/OBS HIGH 75: CPT | Performed by: INTERNAL MEDICINE

## 2021-01-01 PROCEDURE — XW0DXM6 INTRODUCTION OF BARICITINIB INTO MOUTH AND PHARYNX, EXTERNAL APPROACH, NEW TECHNOLOGY GROUP 6: ICD-10-PCS | Performed by: INTERNAL MEDICINE

## 2021-01-01 PROCEDURE — 4A133J1 MONITORING OF ARTERIAL PULSE, PERIPHERAL, PERCUTANEOUS APPROACH: ICD-10-PCS | Performed by: HOSPITALIST

## 2021-01-01 PROCEDURE — 3E033XZ INTRODUCTION OF VASOPRESSOR INTO PERIPHERAL VEIN, PERCUTANEOUS APPROACH: ICD-10-PCS | Performed by: HOSPITALIST

## 2021-01-01 PROCEDURE — 71045 X-RAY EXAM CHEST 1 VIEW: CPT | Performed by: EMERGENCY MEDICINE

## 2021-01-01 PROCEDURE — B548ZZA ULTRASONOGRAPHY OF SUPERIOR VENA CAVA, GUIDANCE: ICD-10-PCS | Performed by: HOSPITALIST

## 2021-01-01 PROCEDURE — 4A133B1 MONITORING OF ARTERIAL PRESSURE, PERIPHERAL, PERCUTANEOUS APPROACH: ICD-10-PCS | Performed by: HOSPITALIST

## 2021-01-01 PROCEDURE — 71275 CT ANGIOGRAPHY CHEST: CPT | Performed by: INTERNAL MEDICINE

## 2021-01-01 PROCEDURE — 5A1955Z RESPIRATORY VENTILATION, GREATER THAN 96 CONSECUTIVE HOURS: ICD-10-PCS | Performed by: ANESTHESIOLOGY

## 2021-01-01 PROCEDURE — 02HV33Z INSERTION OF INFUSION DEVICE INTO SUPERIOR VENA CAVA, PERCUTANEOUS APPROACH: ICD-10-PCS | Performed by: HOSPITALIST

## 2021-01-01 PROCEDURE — 03HY32Z INSERTION OF MONITORING DEVICE INTO UPPER ARTERY, PERCUTANEOUS APPROACH: ICD-10-PCS | Performed by: HOSPITALIST

## 2021-01-01 PROCEDURE — 5A0955A ASSISTANCE WITH RESPIRATORY VENTILATION, GREATER THAN 96 CONSECUTIVE HOURS, HIGH NASAL FLOW/VELOCITY: ICD-10-PCS | Performed by: HOSPITALIST

## 2021-01-01 PROCEDURE — 99232 SBSQ HOSP IP/OBS MODERATE 35: CPT | Performed by: OTHER

## 2021-01-01 PROCEDURE — 71045 X-RAY EXAM CHEST 1 VIEW: CPT | Performed by: ANESTHESIOLOGY

## 2021-01-01 PROCEDURE — 71275 CT ANGIOGRAPHY CHEST: CPT | Performed by: EMERGENCY MEDICINE

## 2021-01-01 PROCEDURE — 0BH18EZ INSERTION OF ENDOTRACHEAL AIRWAY INTO TRACHEA, VIA NATURAL OR ARTIFICIAL OPENING ENDOSCOPIC: ICD-10-PCS | Performed by: ANESTHESIOLOGY

## 2021-01-01 RX ORDER — LORAZEPAM 2 MG/ML
0.5 INJECTION INTRAMUSCULAR EVERY 6 HOURS PRN
Status: DISCONTINUED | OUTPATIENT
Start: 2021-01-01 | End: 2022-01-01

## 2021-01-01 RX ORDER — ONDANSETRON 2 MG/ML
4 INJECTION INTRAMUSCULAR; INTRAVENOUS EVERY 6 HOURS PRN
Status: DISCONTINUED | OUTPATIENT
Start: 2021-01-01 | End: 2022-01-01

## 2021-01-01 RX ORDER — ALBUMIN, HUMAN INJ 5% 5 %
SOLUTION INTRAVENOUS
Status: DISPENSED
Start: 2021-01-01 | End: 2021-01-01

## 2021-01-01 RX ORDER — INSULIN ASPART 100 [IU]/ML
0.1 INJECTION, SOLUTION INTRAVENOUS; SUBCUTANEOUS ONCE
Status: COMPLETED | OUTPATIENT
Start: 2021-01-01 | End: 2021-01-01

## 2021-01-01 RX ORDER — ALBUTEROL SULFATE 90 UG/1
4 AEROSOL, METERED RESPIRATORY (INHALATION) EVERY 4 HOURS PRN
Status: DISCONTINUED | OUTPATIENT
Start: 2021-01-01 | End: 2021-01-01

## 2021-01-01 RX ORDER — ALPRAZOLAM 0.25 MG/1
0.25 TABLET ORAL NIGHTLY PRN
Status: DISCONTINUED | OUTPATIENT
Start: 2021-01-01 | End: 2021-01-01

## 2021-01-01 RX ORDER — SODIUM PHOSPHATE, DIBASIC AND SODIUM PHOSPHATE, MONOBASIC 7; 19 G/133ML; G/133ML
1 ENEMA RECTAL ONCE AS NEEDED
Status: COMPLETED | OUTPATIENT
Start: 2021-01-01 | End: 2022-01-01

## 2021-01-01 RX ORDER — BUTALBITAL, ACETAMINOPHEN AND CAFFEINE 50; 325; 40 MG/1; MG/1; MG/1
1 TABLET ORAL EVERY 6 HOURS PRN
Status: DISCONTINUED | OUTPATIENT
Start: 2021-01-01 | End: 2021-01-01

## 2021-01-01 RX ORDER — MELATONIN
3 NIGHTLY PRN
Status: DISCONTINUED | OUTPATIENT
Start: 2021-01-01 | End: 2022-01-01

## 2021-01-01 RX ORDER — ACETAMINOPHEN 325 MG/1
650 TABLET ORAL EVERY 6 HOURS PRN
Status: DISCONTINUED | OUTPATIENT
Start: 2021-01-01 | End: 2021-01-01

## 2021-01-01 RX ORDER — DOCUSATE SODIUM 100 MG/1
100 CAPSULE, LIQUID FILLED ORAL 2 TIMES DAILY PRN
Status: DISCONTINUED | OUTPATIENT
Start: 2021-01-01 | End: 2021-01-01

## 2021-01-01 RX ORDER — FUROSEMIDE 10 MG/ML
60 INJECTION INTRAMUSCULAR; INTRAVENOUS ONCE
Status: COMPLETED | OUTPATIENT
Start: 2021-01-01 | End: 2021-01-01

## 2021-01-01 RX ORDER — ACETAMINOPHEN 325 MG/1
650 TABLET ORAL EVERY 4 HOURS PRN
Status: DISCONTINUED | OUTPATIENT
Start: 2021-01-01 | End: 2022-01-01

## 2021-01-01 RX ORDER — ECHINACEA PURPUREA EXTRACT 125 MG
1 TABLET ORAL
Status: DISCONTINUED | OUTPATIENT
Start: 2021-01-01 | End: 2022-01-01

## 2021-01-01 RX ORDER — ENOXAPARIN SODIUM 100 MG/ML
40 INJECTION SUBCUTANEOUS DAILY
Status: DISCONTINUED | OUTPATIENT
Start: 2021-01-01 | End: 2021-01-01

## 2021-01-01 RX ORDER — LORAZEPAM 2 MG/ML
1 INJECTION INTRAMUSCULAR EVERY 6 HOURS PRN
Status: DISCONTINUED | OUTPATIENT
Start: 2021-01-01 | End: 2022-01-01

## 2021-01-01 RX ORDER — MAGNESIUM SULFATE HEPTAHYDRATE 40 MG/ML
2 INJECTION, SOLUTION INTRAVENOUS ONCE
Status: COMPLETED | OUTPATIENT
Start: 2021-01-01 | End: 2021-01-01

## 2021-01-01 RX ORDER — MIDAZOLAM HYDROCHLORIDE 1 MG/ML
INJECTION INTRAMUSCULAR; INTRAVENOUS
Status: DISPENSED
Start: 2021-01-01 | End: 2021-01-01

## 2021-01-01 RX ORDER — BISACODYL 10 MG
10 SUPPOSITORY, RECTAL RECTAL
Status: DISCONTINUED | OUTPATIENT
Start: 2021-01-01 | End: 2022-01-01

## 2021-01-01 RX ORDER — DEXAMETHASONE SODIUM PHOSPHATE 4 MG/ML
10 VIAL (ML) INJECTION EVERY 24 HOURS
Status: DISCONTINUED | OUTPATIENT
Start: 2021-01-01 | End: 2021-01-01

## 2021-01-01 RX ORDER — FUROSEMIDE 10 MG/ML
40 INJECTION INTRAMUSCULAR; INTRAVENOUS ONCE
Status: COMPLETED | OUTPATIENT
Start: 2021-01-01 | End: 2021-01-01

## 2021-01-01 RX ORDER — HYDROCODONE BITARTRATE AND HOMATROPINE METHYLBROMIDE ORAL SOLUTION 5; 1.5 MG/5ML; MG/5ML
5 LIQUID ORAL EVERY 4 HOURS PRN
Status: DISCONTINUED | OUTPATIENT
Start: 2021-01-01 | End: 2022-01-01

## 2021-01-01 RX ORDER — MINERAL OIL AND PETROLATUM 150; 830 MG/G; MG/G
1 OINTMENT OPHTHALMIC 2 TIMES DAILY
Status: DISCONTINUED | OUTPATIENT
Start: 2021-01-01 | End: 2022-01-01

## 2021-01-01 RX ORDER — GUAIFENESIN 400 MG/1
400 TABLET ORAL EVERY 8 HOURS SCHEDULED
Status: DISCONTINUED | OUTPATIENT
Start: 2021-01-01 | End: 2022-01-01

## 2021-01-01 RX ORDER — KETOROLAC TROMETHAMINE 30 MG/ML
30 INJECTION, SOLUTION INTRAMUSCULAR; INTRAVENOUS EVERY 6 HOURS PRN
Status: DISCONTINUED | OUTPATIENT
Start: 2021-01-01 | End: 2021-01-01

## 2021-01-01 RX ORDER — MAGNESIUM OXIDE 400 MG (241.3 MG MAGNESIUM) TABLET
400 TABLET ONCE
Status: COMPLETED | OUTPATIENT
Start: 2021-01-01 | End: 2021-01-01

## 2021-01-01 RX ORDER — BENZONATATE 200 MG/1
200 CAPSULE ORAL 3 TIMES DAILY PRN
Status: DISCONTINUED | OUTPATIENT
Start: 2021-01-01 | End: 2021-01-01

## 2021-01-01 RX ORDER — MIRTAZAPINE 15 MG/1
15 TABLET, FILM COATED ORAL NIGHTLY
Status: DISCONTINUED | OUTPATIENT
Start: 2021-01-01 | End: 2022-01-01

## 2021-01-01 RX ORDER — DEXMEDETOMIDINE HYDROCHLORIDE 4 UG/ML
INJECTION, SOLUTION INTRAVENOUS CONTINUOUS
Status: DISCONTINUED | OUTPATIENT
Start: 2021-01-01 | End: 2021-01-01

## 2021-01-01 RX ORDER — SODIUM CHLORIDE/ALOE VERA
GEL (GRAM) NASAL AS NEEDED
Status: DISCONTINUED | OUTPATIENT
Start: 2021-01-01 | End: 2022-01-01

## 2021-01-01 RX ORDER — POLYETHYLENE GLYCOL 3350 17 G/17G
17 POWDER, FOR SOLUTION ORAL DAILY PRN
Status: DISCONTINUED | OUTPATIENT
Start: 2021-01-01 | End: 2022-01-01

## 2021-01-01 RX ORDER — ETOMIDATE 2 MG/ML
INJECTION INTRAVENOUS
Status: DISPENSED
Start: 2021-01-01 | End: 2021-01-01

## 2021-01-01 RX ORDER — SENNOSIDES 8.8 MG/5ML
10 LIQUID ORAL NIGHTLY PRN
Status: DISCONTINUED | OUTPATIENT
Start: 2021-01-01 | End: 2022-01-01 | Stop reason: ALTCHOICE

## 2021-01-01 RX ORDER — LORAZEPAM 2 MG/ML
2 INJECTION INTRAMUSCULAR ONCE
Status: COMPLETED | OUTPATIENT
Start: 2021-01-01 | End: 2021-01-01

## 2021-01-01 RX ORDER — DEXAMETHASONE SODIUM PHOSPHATE 4 MG/ML
6 VIAL (ML) INJECTION DAILY
Status: DISCONTINUED | OUTPATIENT
Start: 2021-01-01 | End: 2022-01-01

## 2021-01-01 RX ORDER — SENNOSIDES 8.6 MG
8.6 TABLET ORAL 2 TIMES DAILY
Status: DISCONTINUED | OUTPATIENT
Start: 2021-01-01 | End: 2021-01-01 | Stop reason: SDUPTHER

## 2021-01-01 RX ORDER — IPRATROPIUM BROMIDE AND ALBUTEROL SULFATE 2.5; .5 MG/3ML; MG/3ML
3 SOLUTION RESPIRATORY (INHALATION)
Status: DISCONTINUED | OUTPATIENT
Start: 2021-01-01 | End: 2022-01-01

## 2021-01-01 RX ORDER — GUAIFENESIN 600 MG
600 TABLET, EXTENDED RELEASE 12 HR ORAL 2 TIMES DAILY
Status: DISCONTINUED | OUTPATIENT
Start: 2021-01-01 | End: 2021-01-01

## 2021-01-01 RX ORDER — FUROSEMIDE 10 MG/ML
20 INJECTION INTRAMUSCULAR; INTRAVENOUS ONCE
Status: COMPLETED | OUTPATIENT
Start: 2021-01-01 | End: 2021-01-01

## 2021-01-01 RX ORDER — DEXTROSE MONOHYDRATE 25 G/50ML
50 INJECTION, SOLUTION INTRAVENOUS
Status: DISCONTINUED | OUTPATIENT
Start: 2021-01-01 | End: 2022-01-01

## 2021-01-01 RX ORDER — ALBUMIN, HUMAN INJ 5% 5 %
250 SOLUTION INTRAVENOUS ONCE
Status: COMPLETED | OUTPATIENT
Start: 2021-01-01 | End: 2021-01-01

## 2021-01-01 RX ORDER — ENOXAPARIN SODIUM 100 MG/ML
1 INJECTION SUBCUTANEOUS EVERY 12 HOURS
Status: DISCONTINUED | OUTPATIENT
Start: 2021-01-01 | End: 2022-01-01

## 2021-01-01 RX ORDER — ACETAMINOPHEN 10 MG/ML
1000 INJECTION, SOLUTION INTRAVENOUS EVERY 6 HOURS PRN
Status: DISCONTINUED | OUTPATIENT
Start: 2021-01-01 | End: 2022-01-01

## 2021-01-01 RX ORDER — ACETAMINOPHEN 500 MG
1000 TABLET ORAL ONCE
Status: COMPLETED | OUTPATIENT
Start: 2021-01-01 | End: 2021-01-01

## 2021-01-01 RX ORDER — MULTIPLE VITAMINS W/ MINERALS TAB 9MG-400MCG
1 TAB ORAL DAILY
Status: DISCONTINUED | OUTPATIENT
Start: 2021-01-01 | End: 2022-01-01 | Stop reason: SDUPTHER

## 2021-01-01 RX ORDER — MIDAZOLAM HYDROCHLORIDE 1 MG/ML
1 INJECTION INTRAMUSCULAR; INTRAVENOUS EVERY 30 MIN PRN
Status: DISCONTINUED | OUTPATIENT
Start: 2021-01-01 | End: 2022-01-01

## 2021-01-01 RX ORDER — SENNOSIDES 8.8 MG/5ML
5 LIQUID ORAL 2 TIMES DAILY
Status: DISCONTINUED | OUTPATIENT
Start: 2021-01-01 | End: 2022-01-01

## 2021-01-01 RX ORDER — DEXAMETHASONE SODIUM PHOSPHATE 4 MG/ML
6 VIAL (ML) INJECTION DAILY
Status: COMPLETED | OUTPATIENT
Start: 2021-01-01 | End: 2021-01-01

## 2021-01-01 RX ORDER — DEXAMETHASONE SODIUM PHOSPHATE 10 MG/ML
10 INJECTION, SOLUTION INTRAMUSCULAR; INTRAVENOUS ONCE
Status: COMPLETED | OUTPATIENT
Start: 2021-01-01 | End: 2021-01-01

## 2021-01-01 RX ORDER — GABAPENTIN 300 MG/1
300 CAPSULE ORAL 3 TIMES DAILY
Status: DISCONTINUED | OUTPATIENT
Start: 2021-01-01 | End: 2022-01-01

## 2021-01-01 RX ORDER — CHLORHEXIDINE GLUCONATE 0.12 MG/ML
15 RINSE ORAL
Status: DISCONTINUED | OUTPATIENT
Start: 2021-01-01 | End: 2022-01-01

## 2021-01-01 RX ORDER — ACETAMINOPHEN 160 MG/5ML
650 SOLUTION ORAL EVERY 4 HOURS PRN
Status: DISCONTINUED | OUTPATIENT
Start: 2021-01-01 | End: 2022-01-01

## 2021-01-01 RX ORDER — IPRATROPIUM BROMIDE AND ALBUTEROL SULFATE 2.5; .5 MG/3ML; MG/3ML
SOLUTION RESPIRATORY (INHALATION)
Status: DISCONTINUED
Start: 2021-01-01 | End: 2021-01-01 | Stop reason: WASHOUT

## 2021-01-01 RX ORDER — DEXAMETHASONE SODIUM PHOSPHATE 4 MG/ML
10 VIAL (ML) INJECTION EVERY 12 HOURS
Status: DISCONTINUED | OUTPATIENT
Start: 2021-01-01 | End: 2021-01-01

## 2021-01-01 RX ORDER — ETOMIDATE 2 MG/ML
INJECTION INTRAVENOUS AS NEEDED
Status: DISCONTINUED | OUTPATIENT
Start: 2021-01-01 | End: 2021-01-01 | Stop reason: ALTCHOICE

## 2021-01-01 RX ORDER — MIDAZOLAM HYDROCHLORIDE 1 MG/ML
2 INJECTION INTRAMUSCULAR; INTRAVENOUS ONCE
Status: COMPLETED | OUTPATIENT
Start: 2021-01-01 | End: 2021-01-01

## 2021-01-01 RX ORDER — PROCHLORPERAZINE EDISYLATE 5 MG/ML
5 INJECTION INTRAMUSCULAR; INTRAVENOUS EVERY 8 HOURS PRN
Status: DISCONTINUED | OUTPATIENT
Start: 2021-01-01 | End: 2022-01-01

## 2021-01-01 RX ORDER — IPRATROPIUM BROMIDE AND ALBUTEROL SULFATE 2.5; .5 MG/3ML; MG/3ML
SOLUTION RESPIRATORY (INHALATION)
Status: COMPLETED
Start: 2021-01-01 | End: 2021-01-01

## 2021-01-01 RX ORDER — ACETAMINOPHEN 650 MG/1
650 SUPPOSITORY RECTAL EVERY 4 HOURS PRN
Status: DISCONTINUED | OUTPATIENT
Start: 2021-01-01 | End: 2022-01-01

## 2021-01-01 RX ORDER — BENZONATATE 200 MG/1
200 CAPSULE ORAL EVERY 4 HOURS PRN
Status: DISCONTINUED | OUTPATIENT
Start: 2021-01-01 | End: 2022-01-01

## 2021-01-01 RX ORDER — ALBUTEROL SULFATE 90 UG/1
8 AEROSOL, METERED RESPIRATORY (INHALATION) EVERY 4 HOURS PRN
Status: DISCONTINUED | OUTPATIENT
Start: 2021-01-01 | End: 2022-01-01

## 2021-01-01 RX ORDER — FAMOTIDINE 10 MG/ML
20 INJECTION, SOLUTION INTRAVENOUS 2 TIMES DAILY
Status: DISCONTINUED | OUTPATIENT
Start: 2021-01-01 | End: 2022-01-01

## 2021-01-01 RX ORDER — FUROSEMIDE 10 MG/ML
INJECTION INTRAMUSCULAR; INTRAVENOUS
Status: COMPLETED
Start: 2021-01-01 | End: 2021-01-01

## 2021-01-01 RX ADMIN — ETOMIDATE 12 MG: 2 INJECTION INTRAVENOUS at 01:36:00

## 2021-12-03 PROBLEM — J12.82 PNEUMONIA DUE TO COVID-19 VIRUS: Status: ACTIVE | Noted: 2021-01-01

## 2021-12-03 PROBLEM — U07.1 PNEUMONIA DUE TO COVID-19 VIRUS: Status: ACTIVE | Noted: 2021-01-01

## 2021-12-03 NOTE — ED INITIAL ASSESSMENT (HPI)
Pt reports cough, body aches and chills, dizziness, sore to bilateral legs, and headaches since Thursday thanksgiving. Negative covid test on 11/26. Taking aleve and mucinex with no relief.  Recently got flu vaccine on 11/01. 92% RA

## 2021-12-04 PROBLEM — E11.65 TYPE 2 DIABETES MELLITUS WITH HYPERGLYCEMIA, WITHOUT LONG-TERM CURRENT USE OF INSULIN (HCC): Status: ACTIVE | Noted: 2021-01-01

## 2021-12-04 PROBLEM — R09.02 HYPOXIA: Status: ACTIVE | Noted: 2021-01-01

## 2021-12-04 NOTE — ED PROVIDER NOTES
Patient Seen in: [facility] Emergency Department      History   Patient presents with:  Headache  Dizziness    Stated Complaint: HA/dizzy x one week    Subjective:   HPI    79-year-old male who is usually healthy and comes in with covid symptoms matilda patient said is chronic        ED Course     Labs Reviewed   COMP METABOLIC PANEL (14) - Abnormal; Notable for the following components:       Result Value    Glucose 283 (*)     Sodium 131 (*)     Chloride 97 (*)     Albumin 2.8 (*)     A/G Ratio 0.7 (*) rate of 84 with no acute ST-T changes. Rate, axis and intervals as noted on the printed ECG report. [facility]        Admission disposition: 12/3/2021  9:59 PM       Patient arrival had pulse oximetry of 92%.   While in the clinical area he dropped to 68 t COVID-19 virus U07.1, J12.82 12/3/2021 Unknown

## 2021-12-04 NOTE — H&P
[facility]  History and Physical     Haritha Moeller Patient Status:  Emergency    1975 MRN [id number]   Location [address] [facility] Attending Germaine Jorge MD   Hosp Day # 0 PCP PHYSICIAN NONSTAFF     Chief Complaint Regular rate and rhythm. No murmurs, rubs or gallops. Equal pulses. Chest and Back: No tenderness or deformity. Abdomen: Soft, nontender, nondistended. Positive bowel sounds. No rebound, guarding or organomegaly.   Neurologic: No focal neurological defi Levemir  6. Hyponatremia  a. Monitor    Quality:  · DVT Prophylaxis: Lovenox  · CODE status: Full  · Angel: No  · If COVID testing is negative, may discontinue isolation: N/A     Plan of care discussed with patient.     Lianne Pappas DO  12/3/2021

## 2021-12-04 NOTE — PROGRESS NOTES
Pt A&Ox4 3LNC  Resting in bed  Denies pain at this time  Voids Up independently   IV Decadron daily  ID Consult  Safety precaution maintained  Will continue to Monitor

## 2021-12-04 NOTE — CONSULTS
INFECTIOUS DISEASE CONSULTATION    Sabrina Tang Patient Status:  Inpatient    1975 MRN [id number]   [facility] 5NW-A Attending Porter Guerra MD   Hosp Day # 0 PCP PHYSICIAN NON Aspart Pen (NOVOLOG) 100 UNIT/ML flexpen 1-10 Units, 1-10 Units, Subcutaneous, TID AC and HS  •  Insulin Aspart Pen (NOVOLOG) 100 UNIT/ML flexpen 1-68 Units, 1-68 Units, Subcutaneous, TID CC  •  enoxaparin (LOVENOX) 40 MG/0.4ML injection 40 mg, 40 mg, Subc exursion  Cardiovascular: No irregularities in rhythm  Abdomen: Soft, nontender, nondistended. Musculoskeletal: Full range of motion of all extremities. No swelling noted. Joints: no effusions  Skin: No lesions.  No erythema, no open wounds      Maribell Apex Maranda Ochoa  [phone]

## 2021-12-04 NOTE — PLAN OF CARE
Newly diagnosed DM with a1c 13/1%. Patient eager to learn about DM however. He feels less weak this AM.  Had two negative covid tests outside the hospital in the days leading up to admission, positive tests x2 here. Remdesivir started.   Patient tolerati

## 2021-12-04 NOTE — ED QUICK NOTES
Assumed care for this pt, noted with paroxysmal dry coughing, attached to monitor. Per pt, coughing just started to day can barely catch breath, + persitent headache. SpO2= 76-80% RA. [facility] Haider Monique made aware.  Will transfer to (-)  [facility]

## 2021-12-04 NOTE — PROGRESS NOTES
[facility]  Progress Note     Delon Hernandezde Patient Status:  Inpatient    1975 MRN [id number]   [facility] 5NW-A Attending Caitlin De La Fuente MD   Hosp Day # 0 PCP PHYSICIAN NONSTAFF     Chief Complaint: cough, SOB    S: Patie Labs   Lab 12/03/21 2040   PCT 0.10       Cardiac  Recent Labs   Lab 12/03/21 2040 12/03/21  2215 12/04/21  0216   TROP <0.045 <0.045 <0.045   PBNP 61  --   --        Creatinine Kinase  Recent Labs   Lab 12/03/21 2040   CK 47       Inflammatory Markers

## 2021-12-05 NOTE — PLAN OF CARE
Patient A&O x4, ambulatory, no c/o pain, lungs diminished with cough. Currently on 4 Ls O2, shifts between 3-5 Ls depending on recovery from ambulation.   Did some diabetic education with patient, started Lantus insulin this evening.      0600 Patient went

## 2021-12-05 NOTE — PROGRESS NOTES
[facility]                INFECTIOUS DISEASE PROGRESS NOTE    Fabian Cuellar Patient Status:  Inpatient    1975 MRN [id number]   [facility] 5NW-A Attending Denver Traylor MD   Hosp Day # 1 PCP PHYSICIAN NONSTAFF     Antib 76 80 78   AST 30 30 26   ALT 24 25 25   BILT 0.4 0.4 0.4   TP 7.0 6.8 6.4       No results found for: [facility] Encounter on 12/03/21   1.  Blood Culture     Status: None (Preliminary result)    Collection Time: 12/04/21  2:18 AM    S

## 2021-12-05 NOTE — PROGRESS NOTES
[facility]  Progress Note     Clay Henderson Patient Status:  Inpatient    1975 MRN [id number]   [facility] 5NW-A Attending Shadia Cabral MD   Hosp Day # 1 PCP PHYSICIAN NONSTAFF     Chief Complaint: cough, SOB    S: Patie Pro-Calcitonin  Recent Labs   Lab 12/03/21 2040   PCT 0.10       Cardiac  Recent Labs   Lab 12/03/21 2040 12/03/21  2215 12/04/21  0216   TROP <0.045 <0.045 <0.045   PBNP 61  --   --        Creatinine Kinase  Recent Labs   Lab 12/03/21 2040   CK 4 MD

## 2021-12-05 NOTE — PLAN OF CARE
COVID-19 Daily Discharge Readiness-Nursing    O2 Sat at Rest:   91% 4L   O2 Sat with Exertion:  93% on  15  liters   Temperature max from last 24 hrs: Temp (24hrs), Av.7 °F (37.1 °C), Min:98.2 °F (36.8 °C), Max:99.1 °F (37.3 °C)    Inflammatory Markers oxygen saturation or ABGs  - Provide Smoking Cessation handout, if applicable  - Encourage broncho-pulmonary hygiene including cough, deep breathe, Incentive Spirometry  - Assess the need for suctioning and perform as needed  - Assess and instruct to repor

## 2021-12-06 NOTE — PLAN OF CARE
COVID-19 Daily Discharge Readiness-Nursing  Ax04. Telemetry sinus rhythm , sinus penny 53 during sleep. Restless at times. , 7-8L HF. Dyspneic , needs 15L with ambulation. SBA. Moderate headache, prn given. Dry cough.  Continent, constipated per patient glucose within target range  - Assess barriers to adequate nutritional intake and initiate nutrition consult as needed  - Instruct patient on self management of diabetes  Outcome: Progressing     Problem: RESPIRATORY - ADULT  Goal: Achieves optimal ventila

## 2021-12-06 NOTE — PROGRESS NOTES
[facility]                INFECTIOUS DISEASE PROGRESS NOTE    Judy Ibarra Patient Status:  Inpatient    1975 MRN [id number]   [facility] 5NW-A Attending Sapna Fischer MD   Hosp Day # 2 PCP PHYSICIAN NONSTAFF     Antib BILT 0.4 0.4 0.4   TP 6.8 6.4 6.1*       No results found for: [facility] Encounter on 12/03/21   1.  Blood Culture     Status: None (Preliminary result)    Collection Time: 12/04/21  2:18 AM    Specimen: Blood,peripheral   Result Lina

## 2021-12-06 NOTE — PROGRESS NOTES
COVID-19 Daily Discharge Readiness-Nursing    O2 Sat at Rest:  96 % on 3 liters  O2 Sat with Exertion:  82 % on 15 liters   Temperature max from last 24 hrs: Temp (24hrs), Av.5 °F (36.9 °C), Min:97.7 °F (36.5 °C), Max:98.9 °F (37.2 °C)    Inflammatory

## 2021-12-06 NOTE — DIETARY NOTE
1338 Rayna Medrano     Admitting diagnosis:  Hypoxia [R09.02]  Type 2 diabetes mellitus with hyperglycemia, without long-term current use of insulin (HCC) [E11.65]  Pneumonia due to COVID-19 virus [U07.1, J12.82]    Ht

## 2021-12-06 NOTE — PROGRESS NOTES
[facility]  Progress Note     Esdras Brown Patient Status:  Inpatient    1975 MRN [id number]   [facility] 5NW-A Attending Bob Louise MD   Hosp Day # 2 PCP PHYSICIAN NONSTAFF     Chief Complaint: cough, SOB    S: Patie Detected (A) 12/04/2021    COVID19 Detected (A) 12/03/2021       Pro-Calcitonin  Recent Labs   Lab 12/03/21 2040   PCT 0.10       Cardiac  Recent Labs   Lab 12/03/21 2040 12/03/21 2215 12/04/21  0216   TROP <0.045 <0.045 <0.045   PBNP 61  --   -- discharge: tbd  Discharge is dependent on: progress  At this point Mr. Jaci Willis is expected to be discharge to: home    Plan of care discussed with patient, Sukhjinder Pruett MD

## 2021-12-06 NOTE — PLAN OF CARE
Multidisciplinary Discharge Rounds held 12/6/2021. Treatment team members present today include , , Charge Nurse, Nurse, RT, PT and Pharmacy caring for [employer].      Other care providers present:    Mobility Goal: up in

## 2021-12-06 NOTE — CONSULTS
Pietro Patient Status:  Inpatient    1975 MRN [id number]   [facility] 5NW-A Attending Caitlin De La Fuente MD   [facility] Day # 2 PCP PHYSICIAN NONSTAFF     Date of Admission: 12/3/2021  7:41 PM  Admission Diagnosis glucose-vitamin C (DEX-4) chewable tab 4 tablet, 4 tablet, Oral, Q15 Min PRN **OR** dextrose 50 % injection 50 mL, 50 mL, Intravenous, Q15 Min PRN **OR** glucose (DEX4) oral liquid 30 g, 30 g, Oral, Q15 Min PRN **OR** glucose-vitamin C (DEX-4) chewable tab hesitancy, or incontinence  Musculoskeletal: denies arthralgias, myalgias, muscle weakness, or joint swelling  Skin: denies rash or pruritis; no jaundice  Neurologic: denies numbness, weakness, ataxia, tremors, or vertigo  Psychiatric: denies insomnia, dep 12/06/2021    RBC 5.05 12/06/2021    HGB 14.8 12/06/2021    HCT 44.8 12/06/2021    MCV 88.7 12/06/2021    MCH 29.3 12/06/2021    MCHC 33.0 12/06/2021    RDW 11.7 12/06/2021    .0 12/06/2021     Lab Results   Component Value Date     12/06/2021 remdesivir (12/4 -  )  - consider addition of baricitinib given marked desaturations with even minimal movement, ultimately defer to ID   - discussed unproven nature of therapeutics targeting covid-19 and potential risks.  Pt is agreeable to treatment plan

## 2021-12-07 NOTE — PROGRESS NOTES
[facility]                INFECTIOUS DISEASE PROGRESS NOTE    Camilo Sutherland Patient Status:  Inpatient    1975 MRN [id number]   [facility] 5NW-A Attending Salena Childers MD   Hosp Day # 3 PCP PHYSICIAN NONSTAFF     Antib 78 74 73   AST 26 24 23   ALT 25 22 21   BILT 0.4 0.4 0.5   TP 6.4 6.1* 6.2*       No results found for: [facility] Encounter on 12/03/21   1.  Blood Culture     Status: None (Preliminary result)    Collection Time: 12/04/21  2:18 AM

## 2021-12-07 NOTE — BH PROGRESS NOTE
COVID-19 Daily Discharge Readiness-Nursing  Ax04. Anxious, restless prn ativan given. Telemetry sinus rhythm , sinus penny 50s during sleep. , 95 %. Vapotherm 40L FiO2 100. Needs reinforcement with proning. Dry cough. Continent. Bedside commode. Salin

## 2021-12-07 NOTE — PROGRESS NOTES
Pietro Patient Status:  Inpatient    1975 MRN [id number]   [facility] 5NW-A Attending Shadia Cabral MD   Hosp Day # 3 PCP PHYSICIAN NONSTAFF     Pulm / Critical Care Progress Note     S: pt states that h murmur. Abdomen: soft, non-tender, non-distended, positive BS.    Extremity: no edema     Recent Labs   Lab 12/04/21  0216 12/05/21  0633 12/06/21  0640   WBC 3.1* 6.5 7.2   HGB 15.6 14.6 14.8   HCT 43.4 42.4 44.8   .0* 179.0 218.0     No results f

## 2021-12-07 NOTE — PROGRESS NOTES
Assumed care at [address] [place] [address] Rd. Pt is AOx4. On Vapotherm at 40L/ 100% FIO2. SR/SB on tele. Bedrest because of O2 needs. QID accucheck.

## 2021-12-07 NOTE — PLAN OF CARE
Multidisciplinary Discharge Rounds held 12/7/2021. Treatment team members present today include , , Charge Nurse, Nurse, RT, PT and Pharmacy caring for [employer].      Other care providers present:    Mobility Goal: up in of diabetes  Outcome: Progressing     Problem: RESPIRATORY - ADULT  Goal: Achieves optimal ventilation and oxygenation  Description: INTERVENTIONS:  - Assess for changes in respiratory status  - Assess for changes in mentation and behavior  - Position to f

## 2021-12-07 NOTE — COVID NURSING ASSESSMENT
COVID-19 Daily Discharge Readiness-Nursing    O2 Sat at Rest:     97% on 30L/80% on Vapotherm  O2 Sat with Exertion:    89% on    40L/100% Vapotherm   Temperature max from last 24 hrs: Temp (24hrs), Av.2 °F (36.8 °C), Min:97.8 °F (36.6 °C), Max:98.8 °F

## 2021-12-07 NOTE — PROGRESS NOTES
RN Rika Viera was notified of pts oxygen saturation at 83% on 5L HFNC, pt was placed on 15L HFNC satting at 85-86%, NRB was placed and only satting at 87-88%. Pt was short of breath.  Dr. Nedra Palacio notified of O2 requirements and gave t/o to place patient on V

## 2021-12-07 NOTE — PROGRESS NOTES
[facility]  Progress Note     [employer] Patient Status:  Inpatient    1975 MRN [id number]   [facility] 5NW-A Attending Brown Lozano MD   Hosp Day # 3 PCP PHYSICIAN NONSTAFF     Chief Complaint: cough, SOB    S: Patie Date    COVID19 Detected (A) 12/04/2021    COVID19 Detected (A) 12/03/2021       Pro-Calcitonin  Recent Labs   Lab 12/03/21 2040   PCT 0.10       Cardiac  Recent Labs   Lab 12/03/21 2040 12/03/21 2215 12/04/21  0216   TROP <0.045 <0.045 <0.045   PBNP 61 patient be referred to TCC on discharge?: tbd  Estimated date of discharge: tbd  Discharge is dependent on: progress  At this point Mr. Jose Angel Garcia is expected to be discharge to: home    Plan of care discussed with patient, Genny Shaver MD

## 2021-12-08 NOTE — PROGRESS NOTES
[facility]                INFECTIOUS DISEASE PROGRESS NOTE    [facility] Patient Status:  Inpatient    1975 MRN [id number]   [facility] 5NW-A Attending Larisa Cortés MD   Hosp Day # 4 PCP PHYSICIAN NONSTAFF     Antib 73   AST 26 24 23   ALT 25 22 21   BILT 0.4 0.4 0.5   TP 6.4 6.1* 6.2*       No results found for: [facility] Encounter on 12/03/21   1.  Blood Culture     Status: None (Preliminary result)    Collection Time: 12/04/21  2:18 AM    Speci

## 2021-12-08 NOTE — PROGRESS NOTES
COVID-19 Daily Discharge Readiness-Nursing    O2 Sat at Rest:     97%   O2 Sat with Exertion:  87  % on   40 L/100% liters   Temperature max from last 24 hrs: Temp (24hrs), Av.3 °F (36.8 °C), Min:97.8 °F (36.6 °C), Max:98.8 °F (37.1 °C)    Inflammatory

## 2021-12-08 NOTE — PROGRESS NOTES
COVID-19 Daily Discharge Readiness-Nursing    O2 Sat at Rest:     95% 40L 80% FiO2  O2 Sat with Exertion:    90% 40L 100% FiO2   Temperature max from last 24 hrs: Temp (24hrs), Av.4 °F (36.9 °C), Min:98 °F (36.7 °C), Max:98.8 °F (37.1 °C)    Inflammato the home discharge recovery videos related to diagnosis. .. [x] Pneumonia     [] COPD    [] Home Health set up.    [] Care partner identified and updated with the plan of care.

## 2021-12-08 NOTE — PROGRESS NOTES
[facility]  Progress Note     [facility] Patient Status:  Inpatient    1975 MRN [id number]   [facility] 5NW-A Attending Brown Lozano MD   Hosp Day # 4 PCP PHYSICIAN NONSTAFF     Chief Complaint: cough, SOB    S: Patie Lab 12/03/21 2040   PCT 0.10       Cardiac  Recent Labs   Lab 12/03/21 2040 12/03/21  2215 12/04/21  0216   TROP <0.045 <0.045 <0.045   PBNP 61  --   --        Creatinine Kinase  Recent Labs   Lab 12/03/21 2040   CK 47       Inflammatory Markers  Rece MD  [facility]  Internal Medicine Hospitalist  Cell [phone]

## 2021-12-08 NOTE — PLAN OF CARE
Problem: Patient/Family Goals  Goal: Patient/Family Long Term Goal  Description: Patient's Long Term Goal: learn about diabetes before going home    Interventions:  - DM educator to see on Monday.    - given instruction on insulin, accuchecks, etc at [employer] Progressing

## 2021-12-08 NOTE — PLAN OF CARE
Problem: Patient/Family Goals  Goal: Patient/Family Long Term Goal  Description: Patient's Long Term Goal: learn about diabetes before going home    Interventions:  - DM educator to see on Monday.    - given instruction on insulin, accuchecks, etc at [employer] Monitor for signs/symptoms of CO2 retention  Outcome: Progressing

## 2021-12-08 NOTE — PROGRESS NOTES
Pietro Patient Status:  Inpatient    1975 MRN [id number]   [facility] 5NW-A Attending Tatyana Navarro MD   Hosp Day # 4 PCP PHYSICIAN NONSTAFF     Pulm / Critical Care Progress Note     S: pt states he is dy Lab 12/05/21  0633 12/06/21  0640 12/08/21  0747   WBC 6.5 7.2 9.8   HGB 14.6 14.8 16.5   HCT 42.4 44.8 48.4   .0 218.0 295.0     No results for input(s): INR in the last 168 hours.       Recent Labs   Lab 12/06/21  0640 12/07/21  0551 12/08/21  07 lovenox  4. Dispo:  - will follow   - critically ill with risk for further decompensation. Ok to remain on current level of care. Crit care time: 28 min      Silvia Boland M.D.   [facility]  Pulmonary / Critical care  12/8/2021  10:21 AM

## 2021-12-09 NOTE — PROGRESS NOTES
Pietro Patient Status:  Inpatient    1975 MRN [id number]   [facility] 5NW-A Attending Eliseo Carrillo MD   Hosp Day # 5 PCP PHYSICIAN NONSTAFF     Pulm / Critical Care Progress Note     S: Pt frustrated he i non-distended, positive BS. Extremity: no edema     Recent Labs   Lab 12/06/21  0640 12/08/21  0747 12/09/21  0702   WBC 7.2 9.8 8.8   HGB 14.8 16.5 16.6   HCT 44.8 48.4 49.0   .0 295.0 311.0     No results for input(s): INR in the last 168 hours. dimer normal, continue prophy AC.  CTA negative for PE 12/3  - dexamethasone (12/3 -   )  - completed remdesivir  - cont baricitinib per ID  - discussed unproven nature of therapeutics targeting covid-19 and potential risks.  Pt is agreeable to treatment pl

## 2021-12-09 NOTE — PROGRESS NOTES
[facility]                INFECTIOUS DISEASE PROGRESS NOTE    Chad Iqbal Patient Status:  Inpatient    1975 MRN [id number]   [facility] 5NW-A Attending Paulo Dobbs MD   Hosp Day # 5 PCP PHYSICIAN NONSTAFF     Antib 8.7 8.8   ALB 2.3*  --  2.4* 2.8*  --    *   < > 135* 137 138   K 4.6   < > 4.1 3.6 4.2      < > 101 101 99   CO2 30.0   < > 30.0 29.0 32.0   ALKPHO 74  --  73 93  --    AST 24  --  23 30  --    ALT 22  --  21 25  --    BILT 0.4  --  0.5 0.6  -

## 2021-12-09 NOTE — COVID NURSING ASSESSMENT
COVID-19 Daily Discharge Readiness-Nursing    O2 Sat at Rest:     Vapotherm 40L Fio2 85%, o2 92%   Temperature max from last 24 hrs: Temp (24hrs), Av.2 °F (36.8 °C), Min:97.9 °F (36.6 °C), Max:98.8 °F (37.1 °C)    Inflammatory Markers: Recent Labs   La

## 2021-12-09 NOTE — PLAN OF CARE
COVID-19 Daily Discharge Readiness-Nursing    O2 Sat at Rest:  96% 40L vapotherm at 100%  O2 Sat with Exertion:    % on    liters   Temperature max from last 24 hrs: Temp (24hrs), Av.2 °F (36.8 °C), Min:97.9 °F (36.6 °C), Max:98.8 °F (37.1 °C)    Infla up.    [x] Care partner identified and updated with the plan of care.         Problem: Patient/Family Goals  Goal: Patient/Family Long Term Goal  Description: Patient's Long Term Goal: learn about diabetes before going home    Interventions:  - DM educator instruct to report SOB or any respiratory difficulty  - Respiratory Therapy support as indicated  - Manage/alleviate anxiety  - Monitor for signs/symptoms of CO2 retention  Outcome: Progressing

## 2021-12-09 NOTE — PROGRESS NOTES
[facility]  Progress Note     Lucia York Patient Status:  Inpatient    1975 MRN [id number]   [facility] 5NW-A Attending Toribio Mcdaniel MD   Hosp Day # 5 PCP PHYSICIAN NONSTAFF     Chief Complaint: cough, SOB    S: Patie of 0.71 mg/dL). No results for input(s): PTP, INR in the last 168 hours.          COVID-19 Lab Results    COVID-19  Lab Results   Component Value Date    COVID19 Detected (A) 12/04/2021    COVID19 Detected (A) 12/03/2021       Pro-Calcitonin  Recent Labs TCC on discharge?: tbd  Estimated date of discharge: tbd  Discharge is dependent on: progress  At this point Mr. Yifan Pisano is expected to be discharge to: home    Plan of care discussed with patient, 6[address] Stacia So MD  [facility]  Internal Medicine

## 2021-12-09 NOTE — PLAN OF CARE
Problem: Patient/Family Goals  Goal: Patient/Family Long Term Goal  Description: Patient's Long Term Goal: learn about diabetes before going home    Interventions:  - DM educator to see on Monday.    - given instruction on insulin, accuchecks, etc at [employer] Manage/alleviate anxiety  - Monitor for signs/symptoms of CO2 retention  Outcome: Progressing

## 2021-12-10 NOTE — CM/SW NOTE
12/10/21 1000   CM/SW Screening   Referral Source Social Work (self-referral)   Patient's Current Mental Status at Time of Assessment Alert;Oriented   Patient's Home Environment Condo/Apt no elevator   Discharge Needs   Anticipated D/C needs To be deter

## 2021-12-10 NOTE — DIETARY NOTE
[facility]    Nutrition referral was re-triggered based on consult for \"poor appetite covid, new diabetic\". Pt assessed and provided DM diet education on 12/6.  Spoke with RN who reports pt with poor appetite with minimal PO [place]

## 2021-12-10 NOTE — COVID NURSING ASSESSMENT
COVID-19 Daily Discharge Readiness-Nursing    O2 Sat at Rest:     Vapotherm 40L, Fio2 100%, o2 at 94%  Temperature max from last 24 hrs: Temp (24hrs), Av.3 °F (36.8 °C), Min:98 °F (36.7 °C), Max:98.8 °F (37.1 °C)    Inflammatory Markers: Recent Labs

## 2021-12-10 NOTE — PLAN OF CARE
Problem: Patient/Family Goals  Goal: Patient/Family Long Term Goal  Description: Patient's Long Term Goal: learn about diabetes before going home    Interventions:  - DM educator to see on Monday.    - given instruction on insulin, accuchecks, etc at [employer] difficulty  - Respiratory Therapy support as indicated  - Manage/alleviate anxiety  - Monitor for signs/symptoms of CO2 retention  Outcome: Progressing

## 2021-12-10 NOTE — PROGRESS NOTES
[facility]  Progress Note     [facility]:  [facility]    1975 MRN [id number]   [facility] 5NW-A Attending Brown Lozano MD   Hosp Day # 6 PCP PHYSICIAN NONSTAFF     Chief Complaint: cough, SOB    S: Patie of 0.71 mg/dL). No results for input(s): PTP, INR in the last 168 hours.          COVID-19 Lab Results    COVID-19  Lab Results   Component Value Date    COVID19 Detected (A) 12/04/2021    COVID19 Detected (A) 12/03/2021       Pro-Calcitonin  Recent Labs Dave Alejandro is expected to be discharge to: home    Plan of care discussed with patient, Rn  Franklyn Marc MD  [facility]  Internal Medicine Hospitalist  Cell [phone]

## 2021-12-10 NOTE — PROGRESS NOTES
[facility]                INFECTIOUS DISEASE PROGRESS NOTE    [facility] Patient Status:  Inpatient    1975 MRN [id number]   [facility] 5NW-A Attending Jose Rose MD   Hosp Day # 6 PCP PHYSICIAN NONSTAFF     Antib 2.4*  --  2.8*  --  2.4*   *   < > 137 138 135*   K 4.1   < > 3.6 4.2 4.6      < > 101 99 96*   CO2 30.0   < > 29.0 32.0 31.0   ALKPHO 73  --  93  --  82   AST 23  --  30  --  19   ALT 21  --  25  --  19   BILT 0.5  --  0.6  --  1.1   TP 6.2*

## 2021-12-10 NOTE — COVID NURSING ASSESSMENT
COVID-19 Daily Discharge Readiness-Nursing    O2 Sat at Rest:    93 %  40L 100%  O2 Sat with Exertion:    83% on   40 liters 100%  Temperature max from last 24 hrs: Temp (24hrs), Av.2 °F (36.8 °C), Min:97.7 °F (36.5 °C), Max:98.7 °F (37.1 °C)    Inflam

## 2021-12-10 NOTE — PROGRESS NOTES
Pulmonary Progress Note      NAME: Fabian Cuellar - ROOM: [id number] - MRN: [id number] - Age: 42year old - : 1975    Assessment/Plan:  1.  Acute hypoxic resp failure: secondary to covid-19 PNA  - O2 as needed; on vapotherm, wean as able.   - recomme atraumatic. Lips, mucosa, and tongue normal.  No thrush noted. Neck: supple without mass   Lungs: clear but diminished   Chest wall: No tenderness or deformity. Heart: Regular rate and rhythm, normal S1S2, no murmur.    Abdomen: soft, non-tender, non-dis

## 2021-12-11 NOTE — PLAN OF CARE
Problem: Patient/Family Goals  Goal: Patient/Family Short Term Goal  Description: Patient's Short Term Goal: feel less weak  12/5 noc: wean 02 needs, feel better  12/6 AM: Wean O2 as tolerated, Diabetes Education   12/17 Am: wean O2 as tolerated, and cassidy

## 2021-12-11 NOTE — PROGRESS NOTES
[facility]  Progress Note     Cooper Lambert Patient Status:  Inpatient    1975 MRN [id number]   [facility] 5NW-A Attending Florentino Gee MD   Hosp Day # 7 PCP PHYSICIAN NONSTAFF     Chief Complaint: cough, SOB    S: Patie (based on SCr of 0.87 mg/dL). No results for input(s): PTP, INR in the last 168 hours.          COVID-19 Lab Results    COVID-19  Lab Results   Component Value Date    COVID19 Detected (A) 12/04/2021    COVID19 Detected (A) 12/03/2021       Pro-Calcitoni point Mr. Ayoub November is expected to be discharge to: home    Plan of care discussed with patient, Rn  Keith Primrose, MD  [facility]  Internal Medicine Hospitalist  Cell [phone]

## 2021-12-11 NOTE — PLAN OF CARE
COVID-19 Daily Discharge Readiness-Nursing    O2 Sat at Rest:     94% 40L 85% SpO2   O2 Sat with Exertion:    90% on 40L 100 FiO2   Temperature max from last 24 hrs: Temp (24hrs), Av.1 °F (36.7 °C), Min:97.5 °F (36.4 °C), Max:98.4 °F (36.9 °C)    Infla COPD    [] Home Health set up.    [] Care partner identified and updated with the plan of care.        Problem: Patient/Family Goals  Goal: Patient/Family Long Term Goal  Description: Patient's Long Term Goal: learn about diabetes before going home    Inter including cough, deep breathe, Incentive Spirometry  - Assess the need for suctioning and perform as needed  - Assess and instruct to report SOB or any respiratory difficulty  - Respiratory Therapy support as indicated  - Manage/alleviate anxiety  - Monito

## 2021-12-11 NOTE — PROGRESS NOTES
12/11/21 1100   Provider Notification   Reason for Communication Change in status   Provider Name   (Katty Rae)   Method of Communication Page   Response Phone call   Notification Time      This morning this patient is on vapotherm, 40L 90% FiO

## 2021-12-11 NOTE — PROGRESS NOTES
Multidisciplinary Discharge Rounds held 12/10/2021. Treatment team members present today include , , Charge Nurse, Nurse, RT, PT and Pharmacy caring for [employer].        Mobility Goal: ambulating      Active issue(s) requ

## 2021-12-11 NOTE — PROGRESS NOTES
Pietro Patient Status:  Inpatient    1975 MRN [id number]   [facility] 5NW-A Attending Tobias Jeans, MD   Hosp Day # 7 PCP PHYSICIAN NONSTAFF     Pulm / Critical Care Progress Note     S: pt states that he S1S2, no murmur. Abdomen: soft, non-tender, non-distended, positive BS.    Extremity: no edema     Recent Labs   Lab 12/08/21  0747 12/09/21  0702 12/10/21  0722   WBC 9.8 8.8 8.6   HGB 16.5 16.6 17.4   HCT 48.4 49.0 49.5   .0 311.0 371.0     No re normal, continue prophy AC.  CTA negative for PE 12/3  - dexamethasone (12/3 -   )  - completed remdesivir  - cont baricitinib per ID  - discussed unproven nature of therapeutics targeting covid-19 and potential risks.  Pt is agreeable to treatment plan as

## 2021-12-11 NOTE — PROGRESS NOTES
[facility]                INFECTIOUS DISEASE PROGRESS NOTE    Tenisha Reece Patient Status:  Inpatient    1975 MRN [id number]   [facility] 5NW-A Attending Yadira Min MD   Hosp Day # 7 PCP PHYSICIAN NONSTAFF     Antib 4.1   < > 3.6 4.2 4.6      < > 101 99 96*   CO2 30.0   < > 29.0 32.0 31.0   ALKPHO 73  --  93  --  82   AST 23  --  30  --  19   ALT 21  --  25  --  19   BILT 0.5  --  0.6  --  1.1   TP 6.2*  --  6.9  --  6.7    < > = values in this interval not disp

## 2021-12-12 NOTE — PROGRESS NOTES
[facility]  Progress Note     [facility] Patient Status:  Inpatient    1975 MRN [id number]   [facility] 5NW-A Attending Larisa Cortés MD   Hosp Day # 8 PCP PHYSICIAN NONSTAFF     Chief Complaint: cough, SOB    S: Patie mg/dL). No results for input(s): PTP, INR in the last 168 hours.          COVID-19 Lab Results    COVID-19  Lab Results   Component Value Date    COVID19 Detected (A) 12/04/2021    COVID19 Detected (A) 12/03/2021       Pro-Calcitonin  Recent Labs   Lab 1 expected to be discharge to: home    Plan of care discussed with patient, Rn  Reinier Burt MD  [facility]  Internal Medicine Hospitalist  Cell [phone]

## 2021-12-12 NOTE — PROGRESS NOTES
[facility]                INFECTIOUS DISEASE PROGRESS NOTE    Delon Briseno Patient Status:  Inpatient    1975 MRN [id number]   [facility] 5NW-A Attending Caitlin De La Fuente MD   Hosp Day # 8 PCP PHYSICIAN NONSTAFF     Antib 32.0 31.0   ALKPHO 73  --  93  --  82   AST 23  --  30  --  19   ALT 21  --  25  --  19   BILT 0.5  --  0.6  --  1.1   TP 6.2*  --  6.9  --  6.7    < > = values in this interval not displayed.        No results found for: [facility]

## 2021-12-12 NOTE — COVID NURSING ASSESSMENT
COVID-19 Daily Discharge Readiness-Nursing    O2 Sat at Rest:     %   O2 Sat with Exertion:    % on    liters   Temperature max from last 24 hrs: Temp (24hrs), Av °F (36.7 °C), Min:97.5 °F (36.4 °C), Max:98.3 °F (36.8 °C)    Inflammatory Markers: Recen

## 2021-12-12 NOTE — PLAN OF CARE
Problem: Patient/Family Goals  Goal: Patient/Family Long Term Goal  Description: Patient's Long Term Goal: learn about diabetes before going home    Interventions:  - DM educator to see on Monday.    - given instruction on insulin, accuchecks, etc at [employer] need for suctioning and perform as needed  - Assess and instruct to report SOB or any respiratory difficulty  - Respiratory Therapy support as indicated  - Manage/alleviate anxiety  - Monitor for signs/symptoms of CO2 retention  Outcome: Progressing

## 2021-12-12 NOTE — PROGRESS NOTES
Mobility Goal: Prone during night    Readmission Risk:     [] Low     [] Medium     [] High    Active issue(s) requiring resolution prior to discharge: hypoxia, high O2 needs    Anticipated discharge date: TBD    Current discharge plan: Home    F/U appoint

## 2021-12-12 NOTE — COVID NURSING ASSESSMENT
COVID-19 Daily Discharge Readiness-Nursing    O2 Sat at Rest:     %   O2 Sat with Exertion:    % on    liters   Temperature max from last 24 hrs: Temp (24hrs), Av.5 °F (36.9 °C), Min:98.2 °F (36.8 °C), Max:99.4 °F (37.4 °C)    Inflammatory Markers: Rec

## 2021-12-12 NOTE — PROGRESS NOTES
Pulmonary Progress Note      NAME: Judith Ward - ROOM: 8/[facility]-N - MRN: [id number] - Age: 42year old - : 1975    Assessment/Plan:  1.  Acute hypoxic resp failure: secondary to covid-19 PNA  - O2 as needed; on vapotherm, wean as able. On 40L / 10 Physical Exam:   General: alert, cooperative, no respiratory distress. HEENT: Normocephalic atraumatic. Lips, mucosa, and tongue normal.  No thrush noted. Neck: supple without mass   Lungs: clear   Chest wall: No tenderness or deformity.    Heart: Regu

## 2021-12-13 NOTE — PROGRESS NOTES
[facility]                INFECTIOUS DISEASE PROGRESS NOTE    Tenisha Reece Patient Status:  Inpatient    1975 MRN [id number]   [facility] 5NW-A Attending Yadira Min MD   Hosp Day # 9 PCP PHYSICIAN NONSTAFF     Antib 2.8*  --  2.4* 2.4*  --       < > 135* 136 138   K 3.6   < > 4.6 4.0 3.9      < > 96* 97* 98   CO2 29.0   < > 31.0 30.0 31.0   ALKPHO 93  --  82 90  --    AST 30  --  19 17  --    ALT 25  --  19 15*  --    BILT 0.6  --  1.1 0.8  --    TP 6.9  -

## 2021-12-13 NOTE — DIETARY NOTE
[address] Rayna Medrano     PMH:  has a past medical history of Kidney stone.     Admitting diagnosis:  Hypoxia [R09.02]  Type 2 diabetes mellitus with hyperglycemia, without long-term current use of insulin ([facility] [E11.65]

## 2021-12-13 NOTE — COVID NURSING ASSESSMENT
COVID-19 Daily Discharge Readiness-Nursing    O2 Sat at Rest:     %   O2 Sat with Exertion:    % on    liters   Temperature max from last 24 hrs: Temp (24hrs), Av.6 °F (37 °C), Min:98.2 °F (36.8 °C), Max:99.4 °F (37.4 °C)    Inflammatory Markers: Recen

## 2021-12-13 NOTE — PROGRESS NOTES
[facility]  Progress Note     Lucia York Patient Status:  Inpatient    1975 MRN [id number]   [facility] 5NW-A Attending Toribio Mcdaniel MD   Hosp Day # 9 PCP PHYSICIAN NONSTAFF     Chief Complaint: cough, SOB    S: Patie 121.5 mL/min (based on SCr of 0.7 mg/dL). No results for input(s): PTP, INR in the last 168 hours.          COVID-19 Lab Results    COVID-19  Lab Results   Component Value Date    COVID19 Detected (A) 12/04/2021    COVID19 Detected (A) 12/03/2021       P discharge?: tbd  Estimated date of discharge: tbd  Discharge is dependent on: progress  At this point Mr. Lilian Kaufman is expected to be discharge to: home    Plan of care discussed with patient, [address], MD  [address] [place] [address]

## 2021-12-13 NOTE — PROGRESS NOTES
Pulmonary Progress Note      NAME: Gerson Garcia - ROOM: 830/838-F - MRN: [id number] - Age: 42year old - : 1975    Assessment/Plan:  1.  Acute hypoxic resp failure: secondary to covid-19 PNA  - O2 as needed; on vapotherm, wean as able. On 40L / 80 BS.   Extremity: no edema   Skin: no new rash   Neuro: normal    Recent Labs   Lab 12/13/21  0620   RBC 5.18   HGB 16.0   HCT 44.2   MCV 85.3   MCH 30.9   MCHC 36.2   RDW 11.9   NEPRELIM 8.64*   WBC 10.7   .0     Recent Labs   Lab 12/08/21  0747 12/

## 2021-12-14 NOTE — COVID NURSING ASSESSMENT
COVID-19 Daily Discharge Readiness-Nursing    O2 Sat at Rest:     %   O2 Sat with Exertion:    % on    liters   Temperature max from last 24 hrs: Temp (24hrs), Av.5 °F (36.9 °C), Min:98 °F (36.7 °C), Max:98.8 °F (37.1 °C)    Inflammatory Markers: Recen

## 2021-12-14 NOTE — PLAN OF CARE
Problem: Patient/Family Goals  Goal: Patient/Family Long Term Goal  Description: Patient's Long Term Goal: learn about diabetes before going home    Interventions:  - DM educator to see on Monday.    - given instruction on insulin, accuchecks, etc at [employer] breathe, Incentive Spirometry  - Assess the need for suctioning and perform as needed  - Assess and instruct to report SOB or any respiratory difficulty  - Respiratory Therapy support as indicated  - Manage/alleviate anxiety  - Monitor for signs/symptoms o

## 2021-12-14 NOTE — PROGRESS NOTES
[facility]  Progress Note     Camilo Killer Patient Status:  Inpatient    1975 MRN [id number]   [facility] 5NW-A Attending Salena Childers MD   [facility] Day # 10 PCP PHYSICIAN NONSTAFF     Chief Complaint: cough, SOB    S: Jaci (based on SCr of 0.7 mg/dL). No results for input(s): PTP, INR in the last 168 hours.          COVID-19 Lab Results    COVID-19  Lab Results   Component Value Date    COVID19 Detected (A) 12/04/2021    COVID19 Detected (A) 12/03/2021       Pro-Calcitonin none    Will the patient be referred to TCC on discharge?: tbd  Estimated date of discharge: tbd  Discharge is dependent on: progress  At this point Mr. Mitchell Early is expected to be discharge to: home    Plan of care discussed with patient, Rn  Frandy Addison,

## 2021-12-14 NOTE — PROGRESS NOTES
12/14/21 0830 12/14/21 0856   Oxygen Therapy   SpO2 (!) 73 % (!) 88 %  (and non-rebreather)   O2 Device High flow/High humidity High flow/High humidity   FiO2 (%) 75 % 100 %   O2 Flow Rate (L/min) 40 L/min 40 L/min     Lucian MARTINEZ paged with update.  Will be

## 2021-12-14 NOTE — PROGRESS NOTES
Pulmonary Progress Note      NAME: Juliann Jacobson - ROOM: [id number] - MRN: [id number] - Age: 42year old - : 1975    Assessment/Plan:  1.  Acute hypoxic resp failure: secondary to covid-19 PNA  - O2 as needed; on vapotherm, wean as able. On 40L / 10 murmur. Abdomen: soft, non-tender, non-distended, positive BS.    Extremity: no edema   Skin: no new rash   Neuro: normal    Recent Labs   Lab 12/13/21  0620   RBC 5.18   HGB 16.0   HCT 44.2   MCV 85.3   MCH 30.9   MCHC 36.2   RDW 11.9   NEPRELIM 8.64*

## 2021-12-14 NOTE — PROGRESS NOTES
[facility]                INFECTIOUS DISEASE PROGRESS NOTE    Patti Oakland Patient Status:  Inpatient    1975 MRN [id number]   [facility] 5NW-A Attending Ozzie Herrera MD   Hosp Day # 10 PCP PHYSICIAN NONSTAFF     Anti < > 8.9 8.7 8.6   ALB 2.8*  --  2.4* 2.4*  --       < > 135* 136 138   K 3.6   < > 4.6 4.0 3.9      < > 96* 97* 98   CO2 29.0   < > 31.0 30.0 31.0   ALKPHO 93  --  82 90  --    AST 30  --  19 17  --    ALT 25  --  19 15*  --    BILT 0.6  --

## 2021-12-14 NOTE — PLAN OF CARE
Assumed care of patient upon transfer to ICU d/t concern for increasing O2 demands. Pt alert/oriented x4, hemodynamically stable. Vapotherm 40L 100% with sats mid-high 90s, patient agreeable to prone. O2 sats 100% while proned.    Medicated for headache

## 2021-12-15 NOTE — PROGRESS NOTES
[facility]                INFECTIOUS DISEASE PROGRESS NOTE    Veronica Kennedy Patient Status:  Inpatient    1975 MRN [id number]   [facility] [place] 5NW-A Attending Yajaira Scott MD   Hosp Day # 11 PCP PHYSICIAN NONSTAFF     Anti 118 115 127   CA 8.9   < > 8.7 8.6 8.3*   ALB 2.4*  --  2.4*  --  2.1*   *   < > 136 138 135*   K 4.6   < > 4.0 3.9 4.4   CL 96*   < > 97* 98 97*   CO2 31.0   < > 30.0 31.0 31.0   ALKPHO 82  --  90  --  80   AST 19  --  17  --  15   ALT 19  --  15*

## 2021-12-15 NOTE — PLAN OF CARE
Received pt on Vapotherm, titrated down to 4L NC. Increased WOB only with coughing fits. Does well ambulating to chair for meal today. BP and HR stable. Anxious at times, xanax PRN. Hydromet and tesslon PRN for cough, working well.  Voiding via urinal, tui

## 2021-12-15 NOTE — PROGRESS NOTES
[facility]  Progress Note     Fabian Cuellar Patient Status:  Inpatient    1975 MRN [id number]   [facility] 5NW-A Attending Denver Traylor MD   [facility] Day # 6 PCP PHYSICIAN NONSTAFF     Chief Complaint: cough, SOB    S: Jaci not displayed. Estimated Creatinine Clearance: 154.7 mL/min (A) (based on SCr of 0.55 mg/dL (L)). No results for input(s): PTP, INR in the last 168 hours.          COVID-19 Lab Results    COVID-19  Lab Results   Component Value Date    COVID19 Dete patient be referred to TCC on discharge?: tbd  Estimated date of discharge: 1-2 days  Discharge is dependent on: progress  At this point Mr. Abel Fox is expected to be discharge to: home    Brandee Tomlinson, DO

## 2021-12-15 NOTE — PROGRESS NOTES
Pulmonary Progress Note      NAME: Haritha Moeller - ROOM: 707/166-E - MRN: [id number] - Age: 42year old - : 1975    Assessment/Plan:  1. Acute hypoxic resp failure: secondary to covid-19 PNA  - O2 as needed; on vapotherm 35L/70%.  Will attempt wean non-tender, non-distended, positive BS.    Extremity: no edema    Recent Labs   Lab 12/15/21  0444   RBC 5.01   HGB 15.3   HCT 42.9   MCV 85.6   MCH 30.5   MCHC 35.7   RDW 12.0   NEPRELIM 6.65   WBC 8.8   .0     Recent Labs   Lab 12/10/21  0722 12/10

## 2021-12-15 NOTE — PLAN OF CARE
Pt received alert and oriented on Vapo 40L/100%. Titrated to 35/60%, sating high 90's. Side lying and proning off and on through night. Anxious. PRN xanax and ativan. Headache, prn medication given. Afebrile. BP stable. NSR/SB. No BM.  Voids in urinal. Regu

## 2021-12-16 NOTE — PROGRESS NOTES
[facility]  Progress Note     Cooper Lambert Patient Status:  Inpatient    1975 MRN [id number]   [facility] 5NW-A Attending Florentino Gee MD   [facility] Day # 15 PCP PHYSICIAN NONSTAFF     Chief Complaint: cough, SOB    S: Jaci --  0.6 0.6   TP 6.6  --   --  6.1* 6.6    < > = values in this interval not displayed. Estimated Creatinine Clearance: 127 mL/min (A) (based on SCr of 0.67 mg/dL (L)). No results for input(s): PTP, INR in the last 168 hours.          COVID-19 Lab lovenox  · CODE status: full  · Angel: none  · Central line: none    Will the patient be referred to TCC on discharge?: TBD  Estimated date of discharge: TBD  Discharge is dependent on: progress  At this point Mr. Suresh Cartagena is expected to be discharge to: [facility]

## 2021-12-16 NOTE — PROGRESS NOTES
[facility]                INFECTIOUS DISEASE PROGRESS NOTE    Rg Staff Patient Status:  Inpatient    1975 MRN [id number]   [facility] 5NW-A Attending Bhavna Castillo MD   Hosp Day # 12 PCP PHYSICIAN NONSTAFF     Anti --   --  2.1* 2.2*      < > 138 135* 134*   K 4.0   < > 3.9 4.4 4.4   CL 97*   < > 98 97* 97*   CO2 30.0   < > 31.0 31.0 32.0   ALKPHO 90  --   --  80 94   AST 17  --   --  15 20   ALT 15*  --   --  17 22   BILT 0.8  --   --  0.6 0.6   TP 6.6  --   -

## 2021-12-16 NOTE — PLAN OF CARE
Received pt alert, oriented, following commands. Occasional anxiety managed with PRN ativan per MAR. On 4L nasal cannula with diminished breath sounds. Transitioned to vapotherm 40L/100% this AM for desaturation and increased WOB. Afebrile.  Blood pressure

## 2021-12-16 NOTE — PROGRESS NOTES
Pulmonary Progress Note      NAME: Saturnino Banks - ROOM: [facility]-H - MRN: [id number] - Age: 42year old - : 1975    Assessment/Plan:  1.  Acute hypoxic resp failure: secondary to covid-19 PNA  - O2 as needed; on HFNC 10L  - recommend that pt self p murmur. Abdomen: soft, non-tender, non-distended, positive BS.    Extremity: no edema    Recent Labs   Lab 12/16/21  0436   RBC 5.31   HGB 15.6   HCT 47.1   MCV 88.7   MCH 29.4   MCHC 33.1   RDW 11.9   NEPRELIM 7.41   WBC 10.1   .0*     Recent Labs

## 2021-12-16 NOTE — PLAN OF CARE
Received pt on Vapotherm. After desaturating episode this am in the BR with [address] Julia Valerio RN, pt had hard time recovering from incident. Pt very anxious dn scared r/t this incident.  Ativan given for anxiety, reminded of importance to ambulate and that desaturating c

## 2021-12-16 NOTE — PROGRESS NOTES
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

## 2021-12-16 NOTE — CONSULTS
[facility]  Report of Psychiatric Consultation    Marcjoseph Shipman Patient Status:  Inpatient    1975 MRN [id number]   [facility] 4SW-A Attending Poli Short, [address] [facility] Day # 12 PCP PHYSICIAN NONSTAFF     Date of Admission: 12/3/ elevated mood, racing thoughts, decreased need for sleep, grandiose thoughts. Past Psychiatric History: None    Psych Family History: None    Social and Developmental History: Single. He has a son and daughter who are young adults.  He works on setting u Q15 Min PRN  •  Insulin Aspart Pen (NOVOLOG) 100 UNIT/ML flexpen 1-10 Units, 1-10 Units, Subcutaneous, TID AC and HS  •  Insulin Aspart Pen (NOVOLOG) 100 UNIT/ML flexpen 1-68 Units, 1-68 Units, Subcutaneous, TID CC  •  enoxaparin (LOVENOX) 40 MG/0.4ML inje 12/16/2021     12/16/2021    K 4.4 12/16/2021    CL 97 12/16/2021    CO2 32.0 12/16/2021    GLU 88 12/16/2021    CA 8.5 12/16/2021    ALB 2.2 12/16/2021    ALKPHO 94 12/16/2021    BILT 0.6 12/16/2021    TP 6.6 12/16/2021    AST 20 12/16/2021    ALT 2

## 2021-12-17 NOTE — PLAN OF CARE
COVID-19 Daily Discharge Readiness-Nursing    O2 Sat at Rest:     %   O2 Sat with Exertion:    % on    liters   Temperature max from last 24 hrs: Temp (24hrs), Av.5 °F (36.9 °C), Min:97.1 °F (36.2 °C), Max:100.5 °F (38.1 °C)    Inflammatory Markers: Assess for changes in mentation and behavior  - Position to facilitate oxygenation and minimize respiratory effort  - Oxygen supplementation based on oxygen saturation or ABGs  - Provide Smoking Cessation handout, if applicable  - Encourage broncho-pulmona

## 2021-12-17 NOTE — PROGRESS NOTES
[facility]  Report of Psychiatric Progress Note    Rodríguez Hernandez Patient Status:  Inpatient    1975 MRN [id number]   [facility] 4SW-A Attending Mario Del Toro, [address] [facility] Day # 15 PCP PHYSICIAN NONSTAFF     Date of Admission: 12/3 suicidal ideation. Psychiatry Review Systems: No voices or visions. No elevated mood, racing thoughts, decreased need for sleep, grandiose thoughts.     Past Psychiatric History: None    Psych Family History: None    Social and Developmental History: Si **OR** glucose-vitamin C (DEX-4) chewable tab 8 tablet, 8 tablet, Oral, Q15 Min PRN  •  Insulin Aspart Pen (NOVOLOG) 100 UNIT/ML flexpen 1-10 Units, 1-10 Units, Subcutaneous, TID AC and HS  •  Insulin Aspart Pen (NOVOLOG) 100 UNIT/ML flexpen 1-68 Units, 1- .0 12/17/2021    CREATSERUM 0.80 12/17/2021    BUN 10 12/17/2021     12/17/2021    K 5.3 12/17/2021    CL 96 12/17/2021    CO2 30.0 12/17/2021     12/17/2021    CA 8.4 12/17/2021    ALB 2.0 12/17/2021    ALKPHO 88 12/17/2021    BILT 0.6

## 2021-12-17 NOTE — PROGRESS NOTES
[facility]  Progress Note     Sera Arrington Patient Status:  Inpatient    1975 MRN [id number]   [facility] 5NW-A Attending Ligia Coates MD   [facility] Day # 15 PCP PHYSICIAN NONSTAFF     Chief Complaint: cough, SOB    S: Jaci (A) 12/04/2021    COVID19 Detected (A) 12/03/2021       Pro-Calcitonin  Recent Labs   Lab 12/13/21  0620 12/15/21  0444   PCT 0.07 <0.05       Cardiac  No results for input(s): TROP, PBNP in the last 168 hours.     Creatinine Kinase  No results for input(s)

## 2021-12-17 NOTE — PLAN OF CARE
Problem: Patient/Family Goals  Goal: Patient/Family Long Term Goal  Description: Patient's Long Term Goal: learn about diabetes before going home    Interventions:  - DM educator to see on Monday.    - given instruction on insulin, accuchecks, etc at [employer] broncho-pulmonary hygiene including cough, deep breathe, Incentive Spirometry  - Assess the need for suctioning and perform as needed  - Assess and instruct to report SOB or any respiratory difficulty  - Respiratory Therapy support as indicated  - Manage/a

## 2021-12-17 NOTE — PROGRESS NOTES
Pulmonary Progress Note     Assessment / Plan:  1. Acute respiratory failure - due to COVID-19 PNA  - wean supplemental O2 as able  - IS, self prone and OOB as able  - CTA chest pending  2.  COVID-19 PNA - symptom onset 11/25; diagnosed 12/3  - unvaccinated

## 2021-12-17 NOTE — PROGRESS NOTES
[facility]                INFECTIOUS DISEASE PROGRESS NOTE    Camilo Sutherland Patient Status:  Inpatient    1975 MRN [id number]   [facility] 5NW-A Attending Salena Childers MD   Hosp Day # 15 PCP PHYSICIAN NONSTAFF     Anti Culture     Status: None    Collection Time: 12/04/21  2:18 AM    Specimen: Blood,peripheral   Result Value Ref Range    Blood Culture Result No Growth 5 Days N/A         Radiology    CTA 12/3    Problem list reviewed:  Patient Active Problem List:     Pne

## 2021-12-17 NOTE — PROGRESS NOTES
NURSING TRANSFER NOTE      Patient admitted via [address] to room. Safety precautions initiated. Bed in low position. Call light in reach. Assumed care for this patient at 0200. Maintaining o2 sats >90% on 13L HF upon arrival to floor.  Updated

## 2021-12-18 NOTE — PROGRESS NOTES
[facility]  Progress Note     Saturnino Jingharvinder Patient Status:  Inpatient    1975 MRN [id number]   [facility] 5NW-A Attending Guera Salmeron MD   [facility] Day # 15 PCP PHYSICIAN NONSTAFF     Chief Complaint: cough, SOB    S: Jaci Pro-Calcitonin  Recent Labs   Lab 12/13/21  0620 12/15/21  0444   PCT 0.07 <0.05       Cardiac  No results for input(s): TROP, PBNP in the last 168 hours. Creatinine Kinase  No results for input(s): CK in the last 168 hours.     Inflammatory Marker

## 2021-12-18 NOTE — PLAN OF CARE
COVID-19 Daily Discharge Readiness-Nursing    O2 Sat at Rest:    92 %  3L  O2 Sat with Exertion:    % on    liters   Temperature max from last 24 hrs: Temp (24hrs), Av.6 °F (37.6 °C), Min:98.5 °F (36.9 °C), Max:100.7 °F (38.2 °C)    Anette Jordan Patient/Family Long Term Goal  Description: Patient's Long Term Goal: learn about diabetes before going home    Interventions:  - DM educator to see on Monday.    - given instruction on insulin, accuchecks, etc at point of care  - See additional Care Plan applicable  - Encourage broncho-pulmonary hygiene including cough, deep breathe, Incentive Spirometry  - Assess the need for suctioning and perform as needed  - Assess and instruct to report SOB or any respiratory difficulty  - Respiratory Therapy support

## 2021-12-18 NOTE — PLAN OF CARE
COVID-19 Daily Discharge Readiness-Nursing    O2 Sat at Rest:     95% on 4L NC  Temperature max from last 24 hrs: Temp (24hrs), Av.6 °F (37.6 °C), Min:98.5 °F (36.9 °C), Max:100.7 °F (38.2 °C)    Inflammatory Markers: Recent Labs   Lab 21  0729 1 Glucose maintained within prescribed range  Description: INTERVENTIONS:  - Monitor Blood Glucose as ordered  - Assess for signs and symptoms of hyperglycemia and hypoglycemia  - Administer ordered medications to maintain glucose within target range  - Asse

## 2021-12-18 NOTE — PROGRESS NOTES
Pulmonary Progress Note     Assessment / Plan:  1. Acute respiratory failure - due to COVID-19 PNA. CTA chest with no PE  - wean supplemental O2 as able  - IS, self prone and OOB as able  2.  COVID-19 PNA - symptom onset 11/25; diagnosed 12/3  - unvaccinate

## 2021-12-19 NOTE — PLAN OF CARE
COVID-19 Daily Discharge Readiness-Nursing    O2 Sat at Rest:     92% 10L  O2 Sat with Exertion:    % on    liters   Temperature max from last 24 hrs: Temp (24hrs), Av.9 °F (37.2 °C), Min:98.2 °F (36.8 °C), Max:99.7 °F (37.6 °C)    Inflammatory Markers care.    Problem: Patient/Family Goals  Goal: Patient/Family Long Term Goal  Description: Patient's Long Term Goal: learn about diabetes before going home    Interventions:  - DM educator to see on Monday.    - given instruction on insulin, accuchecks, etc respiratory effort  - Oxygen supplementation based on oxygen saturation or ABGs  - Provide Smoking Cessation handout, if applicable  - Encourage broncho-pulmonary hygiene including cough, deep breathe, Incentive Spirometry  - Assess the need for suctioning

## 2021-12-19 NOTE — COVID NURSING ASSESSMENT
COVID-19 Daily Discharge Readiness-Nursing    O2 Sat at Rest:   94  % on 3L (per report - pt requiring 6-13L w/ exertion)  Temperature max from last 24 hrs: Temp (24hrs), Av °F (37.2 °C), Min:98.2 °F (36.8 °C), Max:100.3 °F (37.9 °C)    Inflammatory Ma with the plan of care.

## 2021-12-19 NOTE — PROGRESS NOTES
[facility]  Progress Note     Judith Ward Patient Status:  Inpatient    1975 MRN [id number]   [facility] 5NW-A Attending Merrick Coleman MD   Hosp Day # 13 PCP PHYSICIAN NONSTAFF     Chief Complaint: cough, SOB    S: Jaci (A) 12/03/2021       Pro-Calcitonin  Recent Labs   Lab 12/13/21  0620 12/15/21  0444   PCT 0.07 <0.05       Cardiac  No results for input(s): TROP, PBNP in the last 168 hours. Creatinine Kinase  No results for input(s): CK in the last 168 hours.     Infl

## 2021-12-19 NOTE — PLAN OF CARE
Problem: Patient/Family Goals  Goal: Patient/Family Long Term Goal  Description: Patient's Long Term Goal: learn about diabetes before going home    Interventions:  - DM educator to see on Monday.    - given instruction on insulin, accuchecks, etc at [employer] Progressing     Problem: RESPIRATORY - ADULT  Goal: Achieves optimal ventilation and oxygenation  Description: INTERVENTIONS:  - Assess for changes in respiratory status  - Assess for changes in mentation and behavior  - Position to facilitate oxygenation

## 2021-12-20 NOTE — PROGRESS NOTES
[facility]  Report of Psychiatric Progress Note    Kriste Graft Patient Status:  Inpatient    1975 MRN [id number]   [facility] 4SW-A Attending Ezequiel Maldonado, [address] [facility] Day # 16 PCP PHYSICIAN NONSTAFF     Date of Admission: 12/3 Remeron. He feels fatigued and weak and depressed. He is tired of being in the hospital. He has less SOB and less anxiety today. He denies hopelessness and suicidal ideation.      12/20/21- He feels very discouraged and depressed because of his increasing O M Plus) tab 1 tablet, 1 tablet, Oral, Daily  •  ayr saline nasal gel, , Nasal, PRN  •  baricitinib TABS 4 mg, 4 mg, Oral, Daily  •  hydrocodone-homatropine (HYDROMET) 5-1.5 MG/5ML syrup 5 mL, 5 mL, Oral, Q4H PRN  •  LORazepam (ATIVAN) injection 0.5 mg, 0.5 77   Resp: 20   Temp: 99.6 °F (37.6 °C)     Appearance: fair grooming  Behavior: cooperative, poor eye contact today  Attitude: cooperative  Gait: not observed    Speech: normal rate and rhythm and soft    Mood: depressed and anxious  Affect: Congruent

## 2021-12-20 NOTE — COVID NURSING ASSESSMENT
COVID-19 Daily Discharge Readiness-Nursing    O2 Sat at Rest:   88-95  % on28L/100% vapotherm   Temperature max from last 24 hrs: Temp (24hrs), Av.3 °F (37.4 °C), Min:98.9 °F (37.2 °C), Max:99.7 °F (37.6 °C)    Inflammatory Markers: Recent Labs   Lab 1

## 2021-12-20 NOTE — DIETARY NOTE
[address]     PMH:  has a past medical history of Kidney stone.     Admitting diagnosis:  Hypoxia [R09.02]  Type 2 diabetes mellitus with hyperglycemia, without long-term current use of insulin ([facility] [E11.65] [address] [place]   Clinical Dietitian  Spectra: [phone]  Pager: [phone]

## 2021-12-20 NOTE — PROGRESS NOTES
Pulmonary Progress Note      NAME: Clay Henderson - ROOM: 224/209-O - MRN: [id number] - Age: 42year old - : 1975    Assessment/Plan:  1. Acute respiratory failure - due to COVID-19 PNA.  CTA chest with no PE  - wean supplemental O2 as able, Needed NEPRELIM 5.93   WBC 8.2   .0     Recent Labs   Lab 12/18/21  0625 12/19/21  0811 12/20/21  0640   * 180* 171*   BUN 10 10 10   CREATSERUM 0.54* 0.56* 0.63*   GFRAA 148 145 139   GFRNAA 128 126 120   CA 8.4* 8.4* 8.3*   ALB 2.0* 2.1* 2.0*

## 2021-12-20 NOTE — PROGRESS NOTES
12/19/21 7657   Provider Notification   Reason for Communication Change in status   Test/Procedure Name desating    Provider Name Other (comment)  Lani Fraga )   Method of Communication Page   Response Waiting for response   Notification Time 1473

## 2021-12-20 NOTE — PROGRESS NOTES
[facility]                INFECTIOUS DISEASE PROGRESS NOTE    Dano Shotr Patient Status:  Inpatient    1975 MRN [id number]   [facility] 5NW-A Attending Mariela Topete MD   Hosp Day # 12 PCP PHYSICIAN NONSTAFF     Anti [address] Encounter on 12/03/21   1.  Blood Culture     Status: None    Collection Time: 12/04/21  2:18 AM    Specimen: Blood,peripheral   Result Value Ref Range    Blood Culture Result No Growth 5 Days N/A         Radiology    CTA 12/

## 2021-12-20 NOTE — PROGRESS NOTES
12/19/21 1855   Provider Notification   Reason for Communication Change in status   Test/Procedure Name desating   Provider Name Ivelisse Martinez MD   Method of Communication Page   Response Waiting for response   Notification Time [id number]   MD notified for

## 2021-12-20 NOTE — PROGRESS NOTES
[facility]  Progress Note     Marsha Pierre Patient Status:  Inpatient    1975 MRN [id number]   [facility] 5NW-A Attending Armani Baker MD   [facility] Day # 12 PCP PHYSICIAN NONSTAFF     Chief Complaint: cough, SOB    S: Jaci Results    COVID-19  Lab Results   Component Value Date    COVID19 Detected (A) 12/04/2021    COVID19 Detected (A) 12/03/2021       Pro-Calcitonin  Recent Labs   Lab 12/15/21  0444   PCT <0.05       Cardiac  No results for input(s): TROP, PBNP in the last

## 2021-12-20 NOTE — PLAN OF CARE
Problem: Patient/Family Goals  Goal: Patient/Family Long Term Goal  Description: Patient's Long Term Goal: learn about diabetes before going home    Interventions:  - DM educator to see on Monday.    - given instruction on insulin, accuchecks, etc at [employer] retention  Outcome: Not Progressing

## 2021-12-21 NOTE — PROGRESS NOTES
[facility]                INFECTIOUS DISEASE PROGRESS NOTE    Galindo Medici Patient Status:  Inpatient    1975 MRN [id number]   [facility] 5NW-A Attending Lachelle Ramirez MD   Hosp Day # 16 PCP PHYSICIAN NONSTAFF     Anti 134* 129*   K 4.7 4.5 4.8   CL 98 98 93*   CO2 28.0 29.0 27.0   ALKPHO 122* 122* 144*   AST 20 16 30   ALT 30 26 29   BILT 0.5 0.5 0.9   TP 6.5 6.4 7.8       No results found for: [address] Encounter on 12/03/21   1.  Blood Culture

## 2021-12-21 NOTE — PLAN OF CARE
Assumed patient care this morning. Intubated/sedated/paralyzed. Compliant with vent. Received on prone position. Reviewed with Dr. Bry Ellsworth this morning. No tube feeds for now.  Supine this afternoon, hypotension afterwards, restarted levophed, did not desat

## 2021-12-21 NOTE — ANESTHESIA PROCEDURE NOTES
Airway  Date/Time: 12/21/2021 1:35 AM  Urgency: elective    Airway not difficult    General Information and Staff    Patient location during procedure: ICU  Anesthesiologist: Darwin León MD  Performed: anesthesiologist     Indications and Patient Co

## 2021-12-21 NOTE — COVID NURSING ASSESSMENT
COVID-19 Daily Discharge Readiness-Nursing    O2 Sat at Rest:    88-94 % on 30L/100% weaned to 30L/90%    Temperature max from last 24 hrs: Temp (24hrs), Av.3 °F (37.4 °C), Min:98.9 °F (37.2 °C), Max:99.7 °F (37.6 °C)    Inflammatory Markers: Recent La

## 2021-12-21 NOTE — RESPIRATORY THERAPY NOTE
ART Line placed with 20 G arrow catheter using ultrasound. Line placed with good pulsation and pullback. Pt tolerated well.

## 2021-12-21 NOTE — CODE DOCUMENTATION
[facility]  RAPID RESPONSE NOTE     Derrill Min Patient Status:  Inpatient    1975 MRN [id number]   [facility] 5NW-A Attending Brittany Reeves MD   [address] Day # 16 PCP PHYSICIAN NONSTAFF     Reason for RRT: hypoxia, SOB

## 2021-12-21 NOTE — PLAN OF CARE
Received pt from floor at 0040. Pt anxious, tachypneic, labored breathing. Ativan and fentanyl push per APN order. Precedex gtt for anxiety management. See MAR. AVAPS in place at 100%. Diminished breath sounds with rhonci and rales throughout.  Persistent l

## 2021-12-21 NOTE — PROGRESS NOTES
RRT called d/t patient desatting to 69% on 40L/100% vapotherm and maxed out non rebreather. Patient prone. 40mg IV lasix given. Patient transitioned to bipap and transferred to ICU.

## 2021-12-21 NOTE — PROGRESS NOTES
Patient returned to ICU post RRT for worsening hypoxia despite maximal O2 support. He arrived in prone position on AVAPS with increased work of breathing, tachypnea, and desaturation on bedside monitor. Lung sounds diminished bilaterally.   Lasix was give CVC) and are in agreement if line is needed. Informed that vasopressor was needed with initiation of large quantity of sedation, though need is decreasing. [id number]:  Labs delayed by redraw--reviewed once available.   Inflammatory markers increasing D

## 2021-12-21 NOTE — PROGRESS NOTES
Patti Vallecillo Patient Status:  Inpatient    1975 MRN [id number]   [facility] 4SW-A Attending Ozzie Herrera MD   [facility] Day # 16 PCP PHYSICIAN NONSTAFF     Critical Care Progress Note      Assessment/Plan:  1.  Acute respiratory fa insulin detemir  12 Units Subcutaneous Nightly   • mirtazapine  15 mg Oral Nightly   • lidocaine-menthol  1 patch Transdermal Daily   • multivitamin with minerals  1 tablet Oral Daily   • Insulin Aspart Pen  1-68 Units Subcutaneous TID CC   • guaiFENesin E input(s): TROP, CK in the last 168 hours. Imaging: I independently visualized all relevant chest imaging in PACS, agree with radiology interpretation except where noted.

## 2021-12-21 NOTE — PLAN OF CARE
O2 needs continue to increase tonight. Patient on 30L/90%  vapotherm with non rebreather over face at all times. He continued to maintain O2 sats 86%. RT called who adjusted vapotherm to 40L/100% along with non rebreather.    Patient continues to prone, d

## 2021-12-22 NOTE — PROGRESS NOTES
[facility]                INFECTIOUS DISEASE PROGRESS NOTE    Patti Halifax Patient Status:  Inpatient    1975 MRN [id number]   [facility] 5NW-A Attending Ozzie Herrera MD   Hosp Day # 25 PCP PHYSICIAN NONSTAFF     Anti 4.8 5.1   CL 98 93* 103   CO2 29.0 27.0 30.0   ALKPHO 122* 144* 113   AST 16 30 20   ALT 26 29 25   BILT 0.5 0.9 0.4   TP 6.4 7.8 7.4       No results found for: [address] Encounter on 12/03/21   1.  Blood Culture     Status: None (Pre

## 2021-12-22 NOTE — PROGRESS NOTES
Current vent settings noted below. Proned around midnight. Will wean O2 as tolerated.        12/22/21 0337   Vent Information   $ RT Standby Charge (per 15 min) 1  (vent)   Ventilator Initiation 12/21/21   Ventilation Day(s) 2   Interface Invasive   Vent Ty

## 2021-12-22 NOTE — PLAN OF CARE
Received pt pharmaceutically paralyzed. Propofol, precedex, and fentanyl gtt per [facility] for sedation. BIS monitor in place. TOF per protocol. #7.5 ETT in place with Fio2 titrated per RT. Diminished breath sounds. Prone at 0000. Afebrile.  Levophed per MAR to m

## 2021-12-22 NOTE — PROGRESS NOTES
Resumed care at 0700, received patient on full vent support and pharmacologically paralyzed. Propofol, precedex, fentanyl gtt are infusing, Nimbex titrated down and stopped. Levophed restarted and titrated to maintain SBP > 90.  Patient is occasionally stac

## 2021-12-22 NOTE — PROGRESS NOTES
[facility]  Progress Note     Veronica Kennedy Patient Status:  Inpatient    1975 MRN [id number]   [facility] 5NW-A Attending Yajaira Scott MD   Hosp Day # 25 PCP PHYSICIAN NONSTAFF     Chief Complaint: cough, SOB    S: Jaci Pro-Calcitonin  Recent Labs   Lab 12/20/21  1253 12/21/21  0252 12/21/21  0948   PCT 0.14 0.41* 8.70*       Cardiac  Recent Labs   Lab 12/20/21  1253   PBNP 95       Creatinine Kinase  No results for input(s): CK in the last 168 hours.     Inflammator full  · Angel: none  · Central line: none    Will the patient be referred to [facility] on discharge?: TBD  Estimated date of discharge: tbd  Discharge is dependent on: O2 needs   At this point Mr. Izaiah Reeves is expected to be discharge to: home    Philomena Torres MD

## 2021-12-22 NOTE — PROGRESS NOTES
Gerson Garcia Patient Status:  Inpatient    1975 MRN [id number]   [facility] 4SW-A Attending Darcy Granados MD   Hosp Day # 25 PCP PHYSICIAN NONSTAFF     Critical Care Progress Note      Assessment/Plan:  1.  Acute respiratory failu 28  S VT:  [325 mL] 325 mL  PEEP/CPAP (cm H2O):  [7 cm H20] 7 cm H20  MAP (cm H2O):  [17-21] 17    Intake/Output Summary (Last 24 hours) at 12/22/2021 0634  Last data filed at 12/22/2021 0603  Gross per 24 hour   Intake 2178.6 ml   Output 2360 ml   Net -18

## 2021-12-22 NOTE — DIETARY NOTE
[address] UP    Pt does not meet malnutrition criteria. NUTRITION INTERVENTION:    1.  Enteral Nutrition - Via NGT, while propofol infusing at current rate, recommend starting Vital HP 1.0 at 20 ml/hour advancing 20 ml/hour q 4 ho sugar levels. Pt has been consuming 100% of his meals from 12/4-12/6. Tolerating po diet without diarrhea, emesis, or constipation. No significant weight changes noted. ANTHROPOMETRICS:  Ht: 167.6 cm (5' 6\")  Wt: 74.8 kg (165 lb).    BMI: Body mass inde mg/dl    Pt is at High nutrition risk    FOLLOW-UP DATE: 12/27/21    Gaby Hoffman, [address] [place] [address], [address] [place] [address]   Clinical Dietitian  Spectra: [phone]  Pager: [phone]

## 2021-12-23 NOTE — RESPIRATORY THERAPY NOTE
Received patient on full vent support, VC+ 28/325/80%/+7. FiO2 requirements have varied throughout the day. Current FiO2 @ 70%. Wean FiO2 as tolerated. Moved to supine position around 1600, tolerated well. Scant/No secretions suctioned via ETT.   Leland

## 2021-12-23 NOTE — PROGRESS NOTES
[facility]                INFECTIOUS DISEASE PROGRESS NOTE    Chariyt Negron Patient Status:  Inpatient    1975 MRN [id number]   [facility] 5NW-A Attending Brittany Reeves MD   Hosp Day # 23 PCP PHYSICIAN NONSTAFF     Anti 93* 103 101   CO2 27.0 30.0 31.0   ALKPHO 144* 113 112   AST 30 20 60*   ALT 29 25 37   BILT 0.9 0.4 0.4   TP 7.8 7.4 7.3       No results found for: [facility] Encounter on 12/03/21   1.  Blood Culture     Status: None (Preliminary res

## 2021-12-23 NOTE — PROGRESS NOTES
Resumed care at 0700, received patient on full vent support. VSS stable, low grade fever throughout the day noted. Propofol, precedex, fentanyl gtt infusing. Patient is experiencing a strong non-productive cough at times, able to manage without paralytic.

## 2021-12-23 NOTE — PROGRESS NOTES
Current vent settings noted below. O2 weaned as tolerated, currently on 50%.  BS are diminished, suctioning very little via ETT       12/23/21 0511   Vent Information   $ RT Standby Charge (per 15 min) 1  (vent)   Ventilator Initiation 12/21/21   Ventilatio

## 2021-12-23 NOTE — PROGRESS NOTES
[facility]  Progress Note     Finas Ast Patient Status:  Inpatient    1975 MRN [id number]   [facility] 5NW-A Attending Mariela Topete MD   [facility] Day # 23 PCP PHYSICIAN NONSTAFF     Chief Complaint: cough, SOB    S: Jaci Pro-Calcitonin  Recent Labs   Lab 12/20/21  1253 12/21/21  0252 12/21/21  0948   PCT 0.14 0.41* 8.70*       Cardiac  Recent Labs   Lab 12/20/21  1253   PBNP 95       Creatinine Kinase  No results for input(s): CK in the last 168 hours.     Inflammator TBD  Estimated date of discharge: tbd  Discharge is dependent on: clinical progress   At this point Mr. Terra Jefferson is expected to be discharge to: home    Sajan Powers DO

## 2021-12-23 NOTE — PROGRESS NOTES
Esdras Brown Patient Status:  Inpatient    1975 MRN [id number]   [facility] 4SW-A Attending Jayce Jimenez, [address] [facility] Day # 23 PCP PHYSICIAN NONSTAFF     Critical Care Progress Note      Assessment/Plan:  1.  Acute respiratory failu Laurentia.Olive mL] 325 mL  PEEP/CPAP (cm H2O):  [7 cm H20] 7 cm H20  MAP (cm H2O):  [14-18] 16    Intake/Output Summary (Last 24 hours) at 12/23/2021 0716  Last data filed at 12/23/2021 0544  Gross per 24 hour   Intake 2889.17 ml   Output 1015 ml   Net 1874.17 ml

## 2021-12-23 NOTE — PLAN OF CARE
Assumed care just before 0100. Pt sedated on vent. Prop/fent/precedex. No cough/no gag; Pupils ERR; 2/sluggish. No response to painful stimuli. Febrile via Rectal temp probe. Tachycardic/Hypotensive episodes. CC RICHARD Nguyen updated; Labs sent; pan cultured.  I

## 2021-12-24 NOTE — PROGRESS NOTES
[facility]  Progress Note     Marsha Pierre Patient Status:  Inpatient    1975 MRN [id number]   [facility] 5NW-A Attending Armani Baker MD   [address] Day # 21 PCP PHYSICIAN NONSTAFF     Chief Complaint: cough, SOB    S: Jaci 12/20/21  1253 12/21/21  0252 12/21/21  0948   PCT 0.14 0.41* 8.70*       Cardiac  Recent Labs   Lab 12/20/21  1253   PBNP 95       Creatinine Kinase  No results for input(s): CK in the last 168 hours.     Inflammatory Markers  Recent Labs   Lab 12/18/21  0 TBD  Estimated date of discharge: TBD  Discharge is dependent on: clinical progress   At this point Mr. Darrin Essex is expected to be discharge to: TBD    Lisa Stephen DO

## 2021-12-24 NOTE — PROGRESS NOTES
Pietro Patient Status:  Inpatient    1975 MRN [id number]   [facility] 4SW-A Attending Sydney Guerra, [address] [facility] Day # 20 PCP PHYSICIAN NONSTAFF     Critical Care Progress Note     Date of Admission: 12/3/2021 PRN  •  Chlorhexidine Gluconate (PERIDEX) 0.12 % solution 15 mL, 15 mL, Mouth/Throat, [email or web address]  •  propofol (DIPRIVAN) infusion, 5-100 mcg/kg/min (Dosing Weight), Intravenous, Continuous  •  famoTIDine (PEPCID) injection 20 mg, 20 mg, Intravenous, BID **OR** glucose-vitamin C (DEX-4) chewable tab 4 tablet, 4 tablet, Oral, Q15 Min PRN **OR** dextrose 50 % injection 50 mL, 50 mL, Intravenous, Q15 Min PRN **OR** glucose (DEX4) oral liquid 30 g, 30 g, Oral, Q15 Min PRN **OR** glucose-vitamin C (DEX-4) chewa 12/24/2021     12/24/2021    CA 8.2 12/24/2021    ALKPHO 98 12/24/2021    ALT 37 12/24/2021    AST 37 12/24/2021    BILT 0.2 12/24/2021    ALB 1.5 12/24/2021    TP 6.6 12/24/2021     No results found for: PT, INR      Recent Labs   Lab 12/24/21  080 limited basis- stop stress dose steroids since pt is not in shock. 3. Possible gram negative pneumonia  - High PCT  - Meropenem (12/21 - )   - Cultures pending  4. Proph  - Lovenox  5. Dispo  - Will follow.  At risk of further decompensation  - d/w brother

## 2021-12-24 NOTE — PROGRESS NOTES
[facility]                INFECTIOUS DISEASE PROGRESS NOTE    Juliet Hollins Patient Status:  Inpatient    1975 MRN [id number]   [facility] 5NW-A Attending Kitty Reyes MD   Hosp Day # 20 PCP PHYSICIAN NONSTAFF     Anti 8.7 8.2*   ALB 1.8* 1.6* 1.5*    135* 139   K 5.1 4.6 4.1    101 105   CO2 30.0 31.0 32.0   ALKPHO 113 112 98   AST 20 60* 37   ALT 25 37 37   BILT 0.4 0.4 0.2   TP 7.4 7.3 6.6       No results found for: [facility]

## 2021-12-24 NOTE — PLAN OF CARE
Resumed patient care at [address] [place] [address] 149. Patient on vent, on prone position FIO2 80%, peep +7. Monitor shows NSR. Sedated with propofol, precedex and fentanyl gtt. Coughs vigorously with head turn, PRN fentanyl bolus given. Sats 91-93%.  Other vital signs are stable a

## 2021-12-25 NOTE — PLAN OF CARE
Assumed pt care at 0730 today. Pt in bed, in the supine position, on the ventilator at 100%/+7 . No s/s of acute distress noted at this time. VSS. Pt does not appear to be in any pain at this time, per CPOT.  Pt does not w/d to painful stim <<----- Click to add NO significant Past Surgical History

## 2021-12-25 NOTE — PLAN OF CARE
Assume care in am . Vital signs monitored. Intubated and sedated. Proned, sats 86, slow to recover. Family updated. Continue to monitor patient progress.

## 2021-12-25 NOTE — PROGRESS NOTES
[facility]  Progress Note     Camilo Sutherland Patient Status:  Inpatient    1975 MRN [id number]   [facility] 5NW-A Attending Salena Childers MD   Hosp Day # 21 PCP PHYSICIAN NONSTAFF     Chief Complaint: cough, SOB    S: Jaci 12/20/21  1253 12/21/21  0252 12/21/21  0948   PCT 0.14 0.41* 8.70*       Cardiac  Recent Labs   Lab 12/20/21  1253   PBNP 95       Creatinine Kinase  No results for input(s): CK in the last 168 hours.     Inflammatory Markers  Recent Labs   Lab 12/20/21  1 progress   At this point Federica Bean Aleida is expected to be discharge to: ZAINA Flood, DO

## 2021-12-25 NOTE — PLAN OF CARE
Received patient on prone position. On Vent FIO2 90%. Sedated with propofol, precedex and fentanyl. Strong cough noticed with head turns. Tolerating tube feeds. Angel catheter with good output. Vital signs are stable at this time.  Will continue to monitor

## 2021-12-25 NOTE — PROGRESS NOTES
Pietro Patient Status:  Inpatient    1975 MRN [id number]   [facility] 4SW-A Attending Milton Phillips, [address] [facility] Day # 21 PCP PHYSICIAN NONSTAFF     Critical Care Progress Note     Date of Admission: 12/3/2021 suppository 10 mg, 10 mg, Rectal, Daily PRN  •  Fleet Enema (FLEET) 7-19 GM/118ML enema 133 mL, 1 enema, Rectal, Once PRN  •  Chlorhexidine Gluconate (PERIDEX) 0.12 % solution 15 mL, 15 mL, Mouth/Throat, [email or web address]  •  propofol (DIPRIVAN) infusion, 5-10 chloride (SALINE MIST) 1 spray, 1 spray, Each Nare, Q3H PRN  •  glucose (DEX4) oral liquid 15 g, 15 g, Oral, Q15 Min PRN **OR** glucose-vitamin C (DEX-4) chewable tab 4 tablet, 4 tablet, Oral, Q15 Min PRN **OR** dextrose 50 % injection 50 mL, 50 mL, Rickford Stallion 12/25/2021    K 4.1 12/25/2021     12/25/2021    CO2 34.0 12/25/2021    BUN 24 12/25/2021    CREATSERUM 0.52 12/25/2021     12/25/2021    CA 8.3 12/25/2021    ALKPHO 96 12/25/2021    ALT 38 12/25/2021    AST 26 12/25/2021    BILT 0.2 12/25/202 thus far, decadron restarted on 12/24  3. Possible gram negative pneumonia  - High PCT  - Meropenem (12/21 - )   - monitor Cx   4. Proph  - Lovenox  5. Dispo  - ICU.  At risk of further decompensation    Critical Care Time greater than: 35 minutes

## 2021-12-26 NOTE — PLAN OF CARE
Assumed care of pt at 1935. Intubated and sedated on fentanyl, propofol, precedex. Pronated from 11am-3am. Pt desturated with head turning and when turned to supination. Oxygen requirements increased overnight from 70% to %. OG with tubefeedings.  No

## 2021-12-26 NOTE — PROGRESS NOTES
Received pt on full support. FIO2 increased as needed. proned at 0300. Will continue to monitor.         12/26/21 0505   Vent Information   $ RT Standby Charge (per 15 min) 1   Ventilator Initiation 12/21/21   Ventilation Day(s) 6   Interface Invasive   Maia Payor

## 2021-12-26 NOTE — PLAN OF CARE
Assume care in am, Patient remains intubated and sedated. Tolerated proning but slow to recover. Tube feeding tolerated. I and O monitored. Family updated. VSS. Continue to monitor patient progress.

## 2021-12-26 NOTE — PROGRESS NOTES
Pietro Patient Status:  Inpatient    1975 MRN [id number]   [facility] 4SW-A Attending Nely Loredo, [address] [facility] Day # 25 PCP PHYSICIAN NONSTAFF     Critical Care Progress Note     Date of Admission: 12/3/2021 GM/118ML enema 133 mL, 1 enema, Rectal, Once PRN  •  Chlorhexidine Gluconate (PERIDEX) 0.12 % solution 15 mL, 15 mL, Mouth/Throat, [email or web address]  •  propofol (DIPRIVAN) infusion, 5-100 mcg/kg/min (Dosing Weight), Intravenous, Continuous  •  famoTIDine (PEP (DEX4) oral liquid 15 g, 15 g, Oral, Q15 Min PRN **OR** glucose-vitamin C (DEX-4) chewable tab 4 tablet, 4 tablet, Oral, Q15 Min PRN **OR** dextrose 50 % injection 50 mL, 50 mL, Intravenous, Q15 Min PRN **OR** glucose (DEX4) oral liquid 30 g, 30 g, Oral, Q BILT 0.2 12/26/2021    ALB 1.5 12/26/2021    TP 6.0 12/26/2021     No results found for: PT, INR      Recent Labs   Lab 12/26/21  0805   ABGPHT 7.43   WONTSJ0I 57*   PBMVH9O 74*   ABGHCO3 36.5*   ABGBE 10.4*   TEMP 15.6   CYNTHIA Not Applicable   SITE Arteri 35 minutes

## 2021-12-26 NOTE — PROGRESS NOTES
[facility]                INFECTIOUS DISEASE PROGRESS NOTE    Tigist Tristan Patient Status:  Inpatient    1975 MRN [id number]   [facility] 5NW-A Attending Brown Lozano MD   Hosp Day # 25 PCP PHYSICIAN NONSTAFF     Anti No results found for: [facility] Encounter on 12/03/21   1.  Blood Culture     Status: None (Preliminary result)    Collection Time: 12/23/21  2:08 AM    Specimen: Blood,peripheral   Result Value Ref Range    Blood Culture Result

## 2021-12-26 NOTE — PROGRESS NOTES
[facility]  Progress Note     Marc Shipman Patient Status:  Inpatient    1975 MRN [id number]   [facility] 5NW-A Attending Mario Kaur MD   [facility] Day # 25 PCP PHYSICIAN NONSTAFF     Chief Complaint: cough, SOB    S: Jaci 12/20/21  1253 12/21/21  0252 12/21/21  0948   PCT 0.14 0.41* 8.70*       Cardiac  Recent Labs   Lab 12/20/21  1253   PBNP 95       Creatinine Kinase  No results for input(s): CK in the last 168 hours.     Inflammatory Markers  Recent Labs   Lab 12/20/21  1 of discharge: TBD  Discharge is dependent on: clinical progress   At this point Mr. Gonsalo Velázquez is expected to be discharge to: TBD    Woody Mcclure, DO

## 2021-12-27 NOTE — DIETARY NOTE
[address] UP    Pt does not meet malnutrition criteria. NUTRITION INTERVENTION:    1.  Enteral Nutrition - Via NGT, while propofol infusing at current rate, recommend continuing with Vital HP 1.0  GOAL: Vital HP at 55 ml/hour to p he has been consuming food to help with his blood sugar levels. Pt has been consuming 100% of his meals from 12/4-12/6. Tolerating po diet without diarrhea, emesis, or constipation. No significant weight changes noted.     ANTHROPOMETRICS:  Ht: 167.6 cm (5' New  2. Start alternative nutrition in 24-48 hrs if diet is not able to advance- New      MEDICATIONS:  Decadron, pepcid, SSI aspart, levemir 15 units nightly, abx, remeron, multivitamin  Gtt: nimbex, precedex, fentanyl, propofol at 30.5 ml/hr (provides ~8

## 2021-12-27 NOTE — PROGRESS NOTES
[facility]  Progress Note     Delon Hernandezde Patient Status:  Inpatient    1975 MRN [id number]   [facility] 5NW-A Attending Caitlin De La Fuente MD   Hosp Day # 21 PCP PHYSICIAN NONSTAFF     Chief Complaint: cough, SOB    S: Jaci 12/21/21  0948   PCT 0.14 0.41* 8.70*       Cardiac  Recent Labs   Lab 12/20/21  1253   PBNP 95       Creatinine Kinase  No results for input(s): CK in the last 168 hours.     Inflammatory Markers  Recent Labs   Lab 12/20/21  1253 12/21/21  0101 12/21/21  0 be discharge to: TBD    Cara Mcgill MD  [facility]  Internal Medicine Hospitalist  Cell [phone]

## 2021-12-27 NOTE — PLAN OF CARE
Patient is on vent FIO2 80%, PEEP 10. On prone position. Head turns done. Sedated with precedex, propofol and fentanyl gtt. Tolerating tube feeds. Angel catheter with good output. Changed position to supine at 0300, tolerated fairly well.  Will continue to m

## 2021-12-27 NOTE — PLAN OF CARE
Received patient this am intubated and sedated on ventilator. Propofol, Precedex, and Fentanyl drips infusing for sedation. Cough/gag w/suction. Scant tan, thick secretions via ETT. Lungs sound dim/rhonchi. Tresea Gave prone patient @4885 w/o difficulty.  Update

## 2021-12-27 NOTE — PROGRESS NOTES
[facility]                INFECTIOUS DISEASE PROGRESS NOTE    Rg Staff Patient Status:  Inpatient    1975 MRN [id number]   [facility] 5NW-A Attending Bhavna Castillo MD   Hosp Day # 23 PCP PHYSICIAN NONSTAFF     Anti BILT 0.2 0.2 0.3   TP 6.5 6.0* 6.0*       No results found for: [facility] Encounter on 12/03/21   1.  Blood Culture     Status: None (Preliminary result)    Collection Time: 12/23/21  2:08 AM    Specimen: Blood,peripheral   Result Va

## 2021-12-27 NOTE — PROGRESS NOTES
Critical Care Progress Note        NAME: Juliet Hollins - ROOM: [facility] MRN: [id number] - Age: 42year old - : 1975  Date of Admission: 12/3/2021  7:41 PM  Admission Diagnosis: Hypoxia [R09.02]  Type 2 diabetes mellitus with hyperglycemia, with tablet Oral Daily     Continuous Infusing Medication:  • fentanyl 100 mcg/hr (12/27/21 0020)   • propofol 65 mcg/kg/min (12/27/21 0905)   • norepinephrine Stopped (12/24/21 1200)   • dexmedetomidine 1.5 mcg/kg/hr (12/27/21 0505)     PRN Medication:albutero restarted on 12/24, repeat inflammatory markers and d-dimer  3. Possible gram negative pneumonia  - High PCT  - Meropenem (12/21 - )   - monitor Cx   4. Proph  - Lovenox  5. Dispo  - ICU.  At risk of further decompensation    Critical Care Time greater than

## 2021-12-27 NOTE — PROGRESS NOTES
Received pt on full support. Patient placed in supine position at 0300. FIO2 increased as needed. No other changes made. Will continue to monitor.         12/27/21 0532   Vent Information   $ RT Standby Charge (per 15 min) 1   Ventilator Initiation 12/21/21

## 2021-12-28 NOTE — PLAN OF CARE
Received patient this am sedated and intubated on ventilator. Lungs sound dim/rhonchi. Placed patient in prone position approx 1200. OG w/TF, tolerating well. Angel w/moderate output. Will cont to monitor.

## 2021-12-28 NOTE — PLAN OF CARE
Patient on vent FIO2 80% and PEEP 10. Sedated with propofol, precedex and fentanyl gtt. On prone position, will supine at 0300. Tolerating tube feeds. Desats at timed with head turns. Will continue to monitor the patient.

## 2021-12-28 NOTE — PROGRESS NOTES
[facility]  Progress Note     Patti Bellevue Patient Status:  Inpatient    1975 MRN [id number]   [facility] 5NW-A Attending Ozzie Herrera MD   [facility] Day # 25 PCP PHYSICIAN NONSTAFF     Chief Complaint: cough, SOB    S: Jaci Cardiac  No results for input(s): TROP, PBNP in the last 168 hours. Creatinine Kinase  No results for input(s): CK in the last 168 hours.     Inflammatory Markers  Recent Labs   Lab 12/27/21  0504 12/28/21  0430   CRP 13.50* 14.20*   FLORENCIO 681.4* [address] [facility]  Internal Medicine Hospitalist  Cell [phone]

## 2021-12-29 NOTE — CM/SW NOTE
Care Progression Note:  Active Acute Medical Issue:   Pneumonia due to COVID-19 virus     Other Contributing Medical Factors/Dx:  Kidney stone    Length of stay: 25  GMLOS: 4.9  Avoidable Delays: none  Discharge Barriers: vented, sedated.    Expected discha

## 2021-12-29 NOTE — PROGRESS NOTES
Anesthesiology         Called to ICU to evaluate pt for cuff leak. Per ICU nurse, pt has been intubated for several weeks. Still ventilating adequately, but audible leak noted.   Tube feeds discontinued only 30 minutes prior to my arrival.         I asked

## 2021-12-29 NOTE — PLAN OF CARE
Assumed care of pt for the night shift. Pt intubated on vent Fio2 70%, peep +10. Sedated on precedex, fentanyl and propofol. Pt in prone position overnight, tolerated well. Pt supine this morning at approximately 0400. OG to continuous tube feeds.  Angel in

## 2021-12-29 NOTE — PROGRESS NOTES
[facility]                INFECTIOUS DISEASE PROGRESS NOTE    Cooper Lambert Patient Status:  Inpatient    1975 MRN [id number]   [facility] 5NW-A Attending Florentino Gee MD   Hosp Day # 25 PCP PHYSICIAN NONSTAFF     Anti No results found for: [facility] Encounter on 12/03/21   1.  Blood Culture     Status: None    Collection Time: 12/23/21  2:08 AM    Specimen: Blood,peripheral   Result Value Ref Range    Blood Culture Result No Growth 5 Days N/A

## 2021-12-29 NOTE — PROGRESS NOTES
[facility]                INFECTIOUS DISEASE PROGRESS NOTE    Camilo Sutherland Patient Status:  Inpatient    1975 MRN [id number]   [facility] 5NW-A Attending Salena Childers MD   Hosp Day # 24 PCP PHYSICIAN NONSTAFF     Anti [address] Encounter on 12/03/21   1.  Blood Culture     Status: None    Collection Time: 12/23/21  2:08 AM    Specimen: Blood,peripheral   Result Value Ref Range    Blood Culture Result No Growth 5 Days N/A         Radiology    CTA 12/

## 2021-12-29 NOTE — PROGRESS NOTES
[facility]  Progress Note     Delon Hernandezde Patient Status:  Inpatient    1975 MRN [id number]   [facility] [place] 5NW-A Attending Caitlin De La Fuente MD   Hosp Day # 22 PCP PHYSICIAN NONSTAFF     Chief Complaint: cough, SOB    S: Jaci hours. Cardiac  No results for input(s): TROP, PBNP in the last 168 hours. Creatinine Kinase  No results for input(s): CK in the last 168 hours.     Inflammatory Markers  Recent Labs   Lab 12/27/21  0504 12/28/21  0430 12/29/21  0358   CRP 13.50* 14.2 is expected to be discharge to: ZAINA Garza MD  [facility]  Internal Medicine Hospitalist  Cell [phone]

## 2021-12-29 NOTE — PROGRESS NOTES
Critical Care Progress Note        NAME: Whitney Osei - ROOM: 983/815-V - MRN: [id number] - Age: 42year old - : 1975  Date of Admission: 12/3/2021  7:41 PM  Admission Diagnosis: Hypoxia [R09.02]  Type 2 diabetes mellitus with hyperglycemia, with Oral Nightly   • lidocaine-menthol  1 patch Transdermal Daily   • multivitamin with minerals  1 tablet Oral Daily     Continuous Infusing Medication:  • fentanyl 100 mcg/hr (12/29/21 8634)   • propofol 65 mcg/kg/min (12/29/21 6458)   • dexmedetomidine 1.5 elevated inflammatory markers; could be due to concomitant PNA or from ongoing inflammation. Given lack of response to treatments thus far, decadron restarted on 12/24, repeat inflammatory markers and d-dimer- decreasing  3.  Possible gram negative pneumon

## 2021-12-30 NOTE — PROGRESS NOTES
Critical Care Progress Note        NAME: Haritha Moeller - ROOM: 763/536-P - MRN: [id number] - Age: 42year old - : 1975  Date of Admission: 12/3/2021  7:41 PM  Admission Diagnosis: Hypoxia [R09.02]  Type 2 diabetes mellitus with hyperglycemia, with 400 mg Per G Tube Q8H [facility]   • mirtazapine  15 mg Oral Nightly   • lidocaine-menthol  1 patch Transdermal Daily   • multivitamin with minerals  1 tablet Oral Daily     Continuous Infusing Medication:  • fentanyl 100 mcg/hr (12/29/21 1848)   • propofol 80 mc (12/21)  - markedly elevated inflammatory markers; could be due to concomitant PNA or from ongoing inflammation. Given lack of response to treatments thus far, decadron restarted on 12/24, repeat inflammatory markers and d-dimer- decreasing  3.  Possible g

## 2021-12-30 NOTE — PROGRESS NOTES
12/30/21 0328   Vent Information   $ RT Standby Charge (per 15 min) 1   Ventilator Initiation 12/21/21   Ventilation Day(s) 10   Interface Invasive   Vent Type    Vent -2   Vent Mode VC+   Settings   FiO2 (%) 60 %   Resp Rate (Set) 28   Vt (S

## 2021-12-30 NOTE — PLAN OF CARE
Patient received orally intubated, sedated with fentanyl, precedex and propofol in the supine position. Placed in prone position a 12pm. Hypotensive this AM, albumin given x1. Tube feeds remain at goal rate. Angel remains patent and draining.  Remains in bi

## 2021-12-30 NOTE — PLAN OF CARE
Patient received orally intubated, sedated with fentanyl, propofol and precedex. Significant cuff leak detected this AM, anesthesia at bedside to evaluate ETT. ETT repositioned, confirmed with xray. Placed in prone position at 1230, tolerating well.  Remain

## 2021-12-30 NOTE — PLAN OF CARE
Assumed care of patient while proned in bed. PICC line. Art line. Ventilator. Propofol, Precedex, and Fentanyl gtt used for sedation and comfort. Afebrile. NSR, SB at times. VSS. OG connected to tube feeds, at goal. No bm overnight.  Angel with adequate outp

## 2021-12-31 NOTE — PROGRESS NOTES
[facility]                INFECTIOUS DISEASE PROGRESS NOTE    Dano Short Patient Status:  Inpatient    1975 MRN [id number]   [facility] 5NW-A Attending Mariela Topete MD   Hosp Day # 32 PCP PHYSICIAN NONSTAFF     Anti 99 96*   CO2 38.0* 39.0* 41.0*   ALKPHO 81 88 89   AST 17 14* 15   ALT 36 33 39   BILT 0.2 0.3 0.2   TP 5.9* 5.8* 5.8*       No results found for: [address] Encounter on 12/03/21   1.  Blood Culture     Status: None    Collection Time:

## 2021-12-31 NOTE — PROGRESS NOTES
Pietro Patient Status:  Inpatient    1975 MRN [id number]   [facility] 4SW-A Attending Felisha Darden MD   Hosp Day # 32 PCP PHYSICIAN NONSTAFF     SUBJECTIVE:no new events. Remains intubated and sedated.   Stil oral liquid 650 mg, 650 mg, Oral, Q4H PRN **OR** acetaminophen (Tylenol) rectal suppository 650 mg, 650 mg, Rectal, Q4H PRN **OR** acetaminophen (OFIRMEV) infusion 1,000 mg, 1,000 mg, Intravenous, Q6H PRN  •  senna (SENOKOT) syrup 17.6 mg, 10 mL, Oral, Nig Intravenous, Q6H PRN **OR** LORazepam (ATIVAN) injection 1 mg, 1 mg, Intravenous, Q6H PRN  •  sodium chloride (SALINE MIST) 1 spray, 1 spray, Each Nare, Q3H PRN  •  glucose (DEX4) oral liquid 15 g, 15 g, Oral, Q15 Min PRN **OR** glucose-vitamin C (DEX-4) c Value Date    COVID19 Detected (A) 12/04/2021    COVID19 Detected (A) 12/03/2021       Inflammatory Markers  Recent Labs   Lab 12/27/21  0504 12/27/21  0504 12/28/21  0430 12/29/21  0358 12/30/21  0445   CRP 13.50*   < > 14.20* 11.60* 10.40*   FLORENCIO 681.4* down over the last several days  -suspect critical illness as etiology  -monitor counts and transfuse PRN  6. FEN  -TFs  7. Proph  - Lovenox  8. Dispo  - ICU.  At risk of further decompensation  - critical care time: 35 minutes    Naomie Cooley MD  12/31/2

## 2021-12-31 NOTE — PLAN OF CARE
Resumed patient care at 0. Patient on prone position, On vent, sedated with propofol, precedex  and fentanyl gtt. Strong vigorous cough present with suction and head turns. Does not follow commands. Tolerating tube feeds. Angel with adequate output.  Vit

## 2021-12-31 NOTE — CONSULTS
Interventional Pulmonology Consult Note  [facility] Patient Status:  Inpatient    1975 MRN [id number]   [facility] 4SW-A Attending Cy Raygoza MD   Hosp Day # 32 PCP PHYSICIAN NONSTAFF     Reason for Consultati artificial tears  1 Application Both Eyes BID   • enoxaparin  1 mg/kg Subcutaneous Q12H   • Insulin Aspart Pen  1-10 Units Subcutaneous 4 times per day   • guaiFENesin  400 mg Per G Tube Q8H [address]   • mirtazapine  15 mg Oral Nightly   • lidocaine-menthol  1 98.2 °F (36.8 °C) Temporal 62 (!) 29 94 % —   12/30/21 1500 — — 63 (!) 30 94 % —   12/30/21 1400 — — 64 (!) 32 96 % —       Intake/Output:    Intake/Output Summary (Last 24 hours) at 12/31/2021 1508  Last data filed at 12/31/2021 1200  Gross per 24 hour No results for input(s): BNP in the last 168 hours. No results for input(s): TROP, CK in the last 168 hours. No results for input(s): PT, INR, PTT in the last 168 hours.     Other Labs:   COVID-19 Lab Results    COVID-19  Lab Results   Component Value consistent with progression of disease. Minimal blunting of the left CP angle. No pneumothorax.     Dictated by (CST): Pavel Mercado MD on 12/30/2021 at 11:45 AM     Finalized by (CST): Pavel Mercado MD on 12/30/2021 at 11:47 AM       XR CHEST A

## 2021-12-31 NOTE — PROGRESS NOTES
[facility]     Hospitalist Progress Note     Veronica Kennedy Patient Status:  Inpatient    1975 MRN [id number]   [facility] 4SW-A Attending Maral Phillips MD   Hosp Day # 32 PCP PHYSICIAN NONSTAFF     Chief Complaint: sob    Subject input(s): CK in the last 168 hours.     Inflammatory Markers  Recent Labs   Lab 12/27/21  0504 12/27/21  0504 12/28/21  0430 12/29/21  0358 12/30/21  0445   CRP 13.50*   < > 14.20* 11.60* 10.40*   FLORENCIO 681.4*   < > 616.0* 595.9* 562.2*   *  --   -- TBD  Discharge is dependent on: clinical progress   At this point Mr. Shirley Turk is expected to be discharge to: TBD  Kenneth Figueroa MD    Supplementary Documentation:

## 2021-12-31 NOTE — PROGRESS NOTES
[facility]     Hospitalist Progress Note     Saturnino Banks Patient Status:  Inpatient    1975 MRN [id number]   [facility] 4SW-A Attending Tuan Veliz MD   Hosp Day # 32 PCP PHYSICIAN NONSTAFF     Chief Complaint: sob    Subject for input(s): CK in the last 168 hours.     Inflammatory Markers  Recent Labs   Lab 12/27/21  0504 12/27/21  0504 12/28/21  0430 12/29/21  0358 12/30/21  0445   CRP 13.50*   < > 14.20* 11.60* 10.40*   FLORENCIO 681.4*   < > 616.0* 595.9* 562.2*   *  --   - discharge: TBD  Discharge is dependent on: clinical progress   At this point Mr. Izaiah Reeves is expected to be discharge to: TBD

## 2021-12-31 NOTE — DIETARY NOTE
[address] UP    Pt does not meet malnutrition criteria. NUTRITION INTERVENTION:    1. Enteral Nutrition - via NGT    Vital High Protein at new goal rate of 45 ml/hr x 24 hours.     · If no IVF, recommend free water flush of 65 ml Patient reports that he does not have much of an appetite, however he has been consuming food to help with his blood sugar levels. Pt has been consuming 100% of his meals from 12/4-12/6. Tolerating po diet without diarrhea, emesis, or constipation.  No sign EVALUATE/NUTRITION GOALS:  1. Weight stable within 1 to 2 lbs during admission - Met, continues  2. Start alternative nutrition in 24-48 hrs if diet is not able to advance- Resolved  3.  Tolerate alternative nutrition at 100% of goal - Met, continues      M

## 2021-12-31 NOTE — PLAN OF CARE
Assumed care of pt after RN report. Sedated on propofol, precedex and fentanyl. Does not withdraw to pain, +cough/gag. Tolerating current vent settings, FiO2 80%. BP stable. NSR. Diaphoretic and warm, rectal probe placed for accurate temperatures.  edna Angel

## 2021-12-31 NOTE — PROGRESS NOTES
12/31/21 0507   Vent Information   $ RT Standby Charge (per 15 min) 1   Ventilator Initiation 12/21/21   Ventilation Day(s) 11   Interface Invasive   Vent Type    Vent ID 2   Vent Mode VC+   Settings   FiO2 (%) 70 %   Resp Rate (Set) 28   Vt (Set,

## 2022-01-01 ENCOUNTER — APPOINTMENT (OUTPATIENT)
Dept: GENERAL RADIOLOGY | Facility: HOSPITAL | Age: 47
DRG: 207 | End: 2022-01-01
Attending: INTERNAL MEDICINE
Payer: OTHER GOVERNMENT

## 2022-01-01 ENCOUNTER — APPOINTMENT (OUTPATIENT)
Dept: GENERAL RADIOLOGY | Facility: HOSPITAL | Age: 47
DRG: 207 | End: 2022-01-01
Attending: NURSE PRACTITIONER
Payer: OTHER GOVERNMENT

## 2022-01-01 ENCOUNTER — APPOINTMENT (OUTPATIENT)
Dept: CV DIAGNOSTICS | Facility: HOSPITAL | Age: 47
DRG: 207 | End: 2022-01-01
Attending: NURSE PRACTITIONER
Payer: OTHER GOVERNMENT

## 2022-01-01 ENCOUNTER — APPOINTMENT (OUTPATIENT)
Dept: CT IMAGING | Facility: HOSPITAL | Age: 47
DRG: 207 | End: 2022-01-01
Attending: INTERNAL MEDICINE
Payer: OTHER GOVERNMENT

## 2022-01-01 ENCOUNTER — NURSE ONLY (OUTPATIENT)
Dept: ELECTROPHYSIOLOGY | Facility: HOSPITAL | Age: 47
DRG: 207 | End: 2022-01-01
Attending: INTERNAL MEDICINE
Payer: OTHER GOVERNMENT

## 2022-01-01 ENCOUNTER — APPOINTMENT (OUTPATIENT)
Dept: CV DIAGNOSTICS | Facility: HOSPITAL | Age: 47
DRG: 207 | End: 2022-01-01
Attending: INTERNAL MEDICINE
Payer: OTHER GOVERNMENT

## 2022-01-01 ENCOUNTER — APPOINTMENT (OUTPATIENT)
Dept: ULTRASOUND IMAGING | Facility: HOSPITAL | Age: 47
DRG: 207 | End: 2022-01-01
Attending: INTERNAL MEDICINE
Payer: OTHER GOVERNMENT

## 2022-01-01 VITALS
DIASTOLIC BLOOD PRESSURE: 73 MMHG | OXYGEN SATURATION: 88 % | SYSTOLIC BLOOD PRESSURE: 105 MMHG | WEIGHT: 155.63 LBS | HEIGHT: 66 IN | RESPIRATION RATE: 28 BRPM | BODY MASS INDEX: 25.01 KG/M2 | TEMPERATURE: 100 F

## 2022-01-01 LAB
ALBUMIN SERPL-MCNC: 1.6 G/DL (ref 3.4–5)
ALBUMIN SERPL-MCNC: 1.7 G/DL (ref 3.4–5)
ALBUMIN SERPL-MCNC: 1.8 G/DL (ref 3.4–5)
ALBUMIN SERPL-MCNC: 1.9 G/DL (ref 3.4–5)
ALBUMIN SERPL-MCNC: 1.9 G/DL (ref 3.4–5)
ALBUMIN SERPL-MCNC: 2 G/DL (ref 3.4–5)
ALBUMIN SERPL-MCNC: 2.1 G/DL (ref 3.4–5)
ALBUMIN SERPL-MCNC: 2.2 G/DL (ref 3.4–5)
ALBUMIN SERPL-MCNC: 2.2 G/DL (ref 3.4–5)
ALBUMIN/GLOB SERPL: 0.3 {RATIO} (ref 1–2)
ALBUMIN/GLOB SERPL: 0.4 {RATIO} (ref 1–2)
ALBUMIN/GLOB SERPL: 0.5 {RATIO} (ref 1–2)
ALP LIVER SERPL-CCNC: 122 U/L
ALP LIVER SERPL-CCNC: 122 U/L
ALP LIVER SERPL-CCNC: 123 U/L
ALP LIVER SERPL-CCNC: 126 U/L
ALP LIVER SERPL-CCNC: 126 U/L
ALP LIVER SERPL-CCNC: 127 U/L
ALP LIVER SERPL-CCNC: 130 U/L
ALP LIVER SERPL-CCNC: 132 U/L
ALP LIVER SERPL-CCNC: 132 U/L
ALP LIVER SERPL-CCNC: 138 U/L
ALP LIVER SERPL-CCNC: 138 U/L
ALP LIVER SERPL-CCNC: 139 U/L
ALP LIVER SERPL-CCNC: 140 U/L
ALP LIVER SERPL-CCNC: 141 U/L
ALP LIVER SERPL-CCNC: 141 U/L
ALP LIVER SERPL-CCNC: 146 U/L
ALP LIVER SERPL-CCNC: 152 U/L
ALP LIVER SERPL-CCNC: 156 U/L
ALP LIVER SERPL-CCNC: 160 U/L
ALP LIVER SERPL-CCNC: 171 U/L
ALP LIVER SERPL-CCNC: 179 U/L
ALP LIVER SERPL-CCNC: 189 U/L
ALP LIVER SERPL-CCNC: 191 U/L
ALP LIVER SERPL-CCNC: 285 U/L
ALP LIVER SERPL-CCNC: 285 U/L
ALP LIVER SERPL-CCNC: 310 U/L
ALP LIVER SERPL-CCNC: 341 U/L
ALP LIVER SERPL-CCNC: 357 U/L
ALT SERPL-CCNC: 31 U/L
ALT SERPL-CCNC: 32 U/L
ALT SERPL-CCNC: 32 U/L
ALT SERPL-CCNC: 35 U/L
ALT SERPL-CCNC: 36 U/L
ALT SERPL-CCNC: 37 U/L
ALT SERPL-CCNC: 37 U/L
ALT SERPL-CCNC: 40 U/L
ALT SERPL-CCNC: 40 U/L
ALT SERPL-CCNC: 41 U/L
ALT SERPL-CCNC: 41 U/L
ALT SERPL-CCNC: 42 U/L
ALT SERPL-CCNC: 43 U/L
ALT SERPL-CCNC: 4352 U/L
ALT SERPL-CCNC: 44 U/L
ALT SERPL-CCNC: 45 U/L
ALT SERPL-CCNC: 47 U/L
ALT SERPL-CCNC: 48 U/L
ALT SERPL-CCNC: 52 U/L
ALT SERPL-CCNC: 53 U/L
ALT SERPL-CCNC: 5540 U/L
ALT SERPL-CCNC: 5736 U/L
ALT SERPL-CCNC: 5754 U/L
ALT SERPL-CCNC: 63 U/L
ALT SERPL-CCNC: 6536 U/L
ALT SERPL-CCNC: 71 U/L
AMMONIA PLAS-MCNC: 29 UMOL/L (ref 11–32)
ANION GAP SERPL CALC-SCNC: 1 MMOL/L (ref 0–18)
ANION GAP SERPL CALC-SCNC: 1 MMOL/L (ref 0–18)
ANION GAP SERPL CALC-SCNC: 11 MMOL/L (ref 0–18)
ANION GAP SERPL CALC-SCNC: 12 MMOL/L (ref 0–18)
ANION GAP SERPL CALC-SCNC: 15 MMOL/L (ref 0–18)
ANION GAP SERPL CALC-SCNC: 15 MMOL/L (ref 0–18)
ANION GAP SERPL CALC-SCNC: 16 MMOL/L (ref 0–18)
ANION GAP SERPL CALC-SCNC: 2 MMOL/L (ref 0–18)
ANION GAP SERPL CALC-SCNC: 3 MMOL/L (ref 0–18)
ANION GAP SERPL CALC-SCNC: 4 MMOL/L (ref 0–18)
ANION GAP SERPL CALC-SCNC: 5 MMOL/L (ref 0–18)
ANION GAP SERPL CALC-SCNC: 6 MMOL/L (ref 0–18)
ANION GAP SERPL CALC-SCNC: 6 MMOL/L (ref 0–18)
ANION GAP SERPL CALC-SCNC: 9 MMOL/L (ref 0–18)
AST SERPL-CCNC: 14 U/L (ref 15–37)
AST SERPL-CCNC: 15 U/L (ref 15–37)
AST SERPL-CCNC: 15 U/L (ref 15–37)
AST SERPL-CCNC: 17 U/L (ref 15–37)
AST SERPL-CCNC: 17 U/L (ref 15–37)
AST SERPL-CCNC: 18 U/L (ref 15–37)
AST SERPL-CCNC: 18 U/L (ref 15–37)
AST SERPL-CCNC: 19 U/L (ref 15–37)
AST SERPL-CCNC: 19 U/L (ref 15–37)
AST SERPL-CCNC: 20 U/L (ref 15–37)
AST SERPL-CCNC: 20 U/L (ref 15–37)
AST SERPL-CCNC: 22 U/L (ref 15–37)
AST SERPL-CCNC: 22 U/L (ref 15–37)
AST SERPL-CCNC: 23 U/L (ref 15–37)
AST SERPL-CCNC: 24 U/L (ref 15–37)
AST SERPL-CCNC: 24 U/L (ref 15–37)
AST SERPL-CCNC: 25 U/L (ref 15–37)
AST SERPL-CCNC: 2945 U/L (ref 15–37)
AST SERPL-CCNC: 30 U/L (ref 15–37)
AST SERPL-CCNC: 31 U/L (ref 15–37)
AST SERPL-CCNC: 35 U/L (ref 15–37)
AST SERPL-CCNC: 40 U/L (ref 15–37)
AST SERPL-CCNC: 5019 U/L (ref 15–37)
AST SERPL-CCNC: 7920 U/L (ref 15–37)
AST SERPL-CCNC: 9050 U/L (ref 15–37)
AST SERPL-CCNC: ABNORMAL U/L (ref 15–37)
BASE EXCESS BLDA CALC-SCNC: -2.5 MMOL/L (ref ?–2)
BASE EXCESS BLDA CALC-SCNC: -2.6 MMOL/L (ref ?–2)
BASE EXCESS BLDA CALC-SCNC: 10.6 MMOL/L (ref ?–2)
BASE EXCESS BLDA CALC-SCNC: 10.6 MMOL/L (ref ?–2)
BASE EXCESS BLDA CALC-SCNC: 10.8 MMOL/L (ref ?–2)
BASE EXCESS BLDA CALC-SCNC: 11.9 MMOL/L (ref ?–2)
BASE EXCESS BLDA CALC-SCNC: 13.1 MMOL/L (ref ?–2)
BASE EXCESS BLDA CALC-SCNC: 13.3 MMOL/L (ref ?–2)
BASE EXCESS BLDA CALC-SCNC: 13.6 MMOL/L (ref ?–2)
BASE EXCESS BLDA CALC-SCNC: 13.7 MMOL/L (ref ?–2)
BASE EXCESS BLDA CALC-SCNC: 14.4 MMOL/L (ref ?–2)
BASE EXCESS BLDA CALC-SCNC: 2.9 MMOL/L (ref ?–2)
BASE EXCESS BLDA CALC-SCNC: 3.2 MMOL/L (ref ?–2)
BASE EXCESS BLDA CALC-SCNC: 3.5 MMOL/L (ref ?–2)
BASE EXCESS BLDA CALC-SCNC: 4 MMOL/L (ref ?–2)
BASE EXCESS BLDA CALC-SCNC: 6.6 MMOL/L (ref ?–2)
BASE EXCESS BLDA CALC-SCNC: 7.8 MMOL/L (ref ?–2)
BASE EXCESS BLDA CALC-SCNC: 8.4 MMOL/L (ref ?–2)
BASE EXCESS BLDA CALC-SCNC: 9.4 MMOL/L (ref ?–2)
BASE EXCESS BLDA CALC-SCNC: 9.8 MMOL/L (ref ?–2)
BASOPHILS # BLD AUTO: 0.04 X10(3) UL (ref 0–0.2)
BASOPHILS # BLD AUTO: 0.05 X10(3) UL (ref 0–0.2)
BASOPHILS # BLD AUTO: 0.05 X10(3) UL (ref 0–0.2)
BASOPHILS # BLD AUTO: 0.06 X10(3) UL (ref 0–0.2)
BASOPHILS # BLD AUTO: 0.07 X10(3) UL (ref 0–0.2)
BASOPHILS # BLD AUTO: 0.08 X10(3) UL (ref 0–0.2)
BASOPHILS # BLD AUTO: 0.08 X10(3) UL (ref 0–0.2)
BASOPHILS # BLD AUTO: 0.09 X10(3) UL (ref 0–0.2)
BASOPHILS # BLD AUTO: 0.1 X10(3) UL (ref 0–0.2)
BASOPHILS # BLD AUTO: 0.11 X10(3) UL (ref 0–0.2)
BASOPHILS # BLD AUTO: 0.11 X10(3) UL (ref 0–0.2)
BASOPHILS # BLD AUTO: 0.14 X10(3) UL (ref 0–0.2)
BASOPHILS # BLD AUTO: 0.21 X10(3) UL (ref 0–0.2)
BASOPHILS # BLD: 0 X10(3) UL (ref 0–0.2)
BASOPHILS # BLD: 0.21 X10(3) UL (ref 0–0.2)
BASOPHILS NFR BLD AUTO: 0.2 %
BASOPHILS NFR BLD AUTO: 0.3 %
BASOPHILS NFR BLD AUTO: 0.3 %
BASOPHILS NFR BLD AUTO: 0.6 %
BASOPHILS NFR BLD AUTO: 0.7 %
BASOPHILS NFR BLD AUTO: 0.7 %
BASOPHILS NFR BLD AUTO: 0.8 %
BASOPHILS NFR BLD AUTO: 0.9 %
BASOPHILS NFR BLD AUTO: 1 %
BASOPHILS NFR BLD AUTO: 1.1 %
BASOPHILS NFR BLD AUTO: 1.3 %
BASOPHILS NFR BLD AUTO: 1.7 %
BASOPHILS NFR BLD: 0 %
BASOPHILS NFR BLD: 1 %
BILIRUB SERPL-MCNC: 0.3 MG/DL (ref 0.1–2)
BILIRUB SERPL-MCNC: 0.4 MG/DL (ref 0.1–2)
BILIRUB SERPL-MCNC: 0.5 MG/DL (ref 0.1–2)
BILIRUB SERPL-MCNC: 0.5 MG/DL (ref 0.1–2)
BILIRUB SERPL-MCNC: 0.7 MG/DL (ref 0.1–2)
BILIRUB SERPL-MCNC: 0.9 MG/DL (ref 0.1–2)
BILIRUB SERPL-MCNC: 1.1 MG/DL (ref 0.1–2)
BILIRUB SERPL-MCNC: 1.4 MG/DL (ref 0.1–2)
BILIRUB SERPL-MCNC: 2.6 MG/DL (ref 0.1–2)
BILIRUB SERPL-MCNC: 2.8 MG/DL (ref 0.1–2)
BILIRUB SERPL-MCNC: 2.8 MG/DL (ref 0.1–2)
BILIRUB SERPL-MCNC: 2.9 MG/DL (ref 0.1–2)
BILIRUB SERPL-MCNC: 3.8 MG/DL (ref 0.1–2)
BILIRUB UR QL STRIP.AUTO: NEGATIVE
BILIRUB UR QL STRIP.AUTO: NEGATIVE
BODY TEMPERATURE: 100 F
BODY TEMPERATURE: 100 F
BODY TEMPERATURE: 100.6 F
BODY TEMPERATURE: 100.9 F
BODY TEMPERATURE: 101 F
BODY TEMPERATURE: 101.5 F
BODY TEMPERATURE: 97.2 F
BODY TEMPERATURE: 98.4 F
BODY TEMPERATURE: 98.5 F
BODY TEMPERATURE: 98.6 F
BODY TEMPERATURE: 98.8 F
BODY TEMPERATURE: 98.9 F
BODY TEMPERATURE: 98.9 F
BUN BLD-MCNC: 105 MG/DL (ref 7–18)
BUN BLD-MCNC: 128 MG/DL (ref 7–18)
BUN BLD-MCNC: 14 MG/DL (ref 7–18)
BUN BLD-MCNC: 14 MG/DL (ref 7–18)
BUN BLD-MCNC: 15 MG/DL (ref 7–18)
BUN BLD-MCNC: 16 MG/DL (ref 7–18)
BUN BLD-MCNC: 17 MG/DL (ref 7–18)
BUN BLD-MCNC: 18 MG/DL (ref 7–18)
BUN BLD-MCNC: 19 MG/DL (ref 7–18)
BUN BLD-MCNC: 20 MG/DL (ref 7–18)
BUN BLD-MCNC: 29 MG/DL (ref 7–18)
BUN BLD-MCNC: 60 MG/DL (ref 7–18)
BUN BLD-MCNC: 72 MG/DL (ref 7–18)
BUN BLD-MCNC: 85 MG/DL (ref 7–18)
BUN BLD-MCNC: 95 MG/DL (ref 7–18)
CA-I BLD-SCNC: 0.7 MMOL/L (ref 1.12–1.32)
CA-I BLD-SCNC: 0.73 MMOL/L (ref 1.12–1.32)
CA-I BLD-SCNC: 0.96 MMOL/L (ref 1.12–1.32)
CA-I BLD-SCNC: 0.99 MMOL/L (ref 1.12–1.32)
CA-I BLD-SCNC: 1.08 MMOL/L (ref 1.12–1.32)
CA-I BLD-SCNC: 1.09 MMOL/L (ref 1.12–1.32)
CA-I BLD-SCNC: 1.12 MMOL/L (ref 1.12–1.32)
CA-I BLD-SCNC: 1.13 MMOL/L (ref 1.12–1.32)
CA-I BLD-SCNC: 1.15 MMOL/L (ref 1.12–1.32)
CA-I BLD-SCNC: 1.16 MMOL/L (ref 1.12–1.32)
CA-I BLD-SCNC: 1.19 MMOL/L (ref 1.12–1.32)
CA-I BLD-SCNC: 1.19 MMOL/L (ref 1.12–1.32)
CALCIUM BLD-MCNC: 5.9 MG/DL (ref 8.5–10.1)
CALCIUM BLD-MCNC: 6.7 MG/DL (ref 8.5–10.1)
CALCIUM BLD-MCNC: 7 MG/DL (ref 8.5–10.1)
CALCIUM BLD-MCNC: 7.7 MG/DL (ref 8.5–10.1)
CALCIUM BLD-MCNC: 7.8 MG/DL (ref 8.5–10.1)
CALCIUM BLD-MCNC: 7.8 MG/DL (ref 8.5–10.1)
CALCIUM BLD-MCNC: 8.1 MG/DL (ref 8.5–10.1)
CALCIUM BLD-MCNC: 8.2 MG/DL (ref 8.5–10.1)
CALCIUM BLD-MCNC: 8.3 MG/DL (ref 8.5–10.1)
CALCIUM BLD-MCNC: 8.5 MG/DL (ref 8.5–10.1)
CALCIUM BLD-MCNC: 8.6 MG/DL (ref 8.5–10.1)
CALCIUM BLD-MCNC: 8.7 MG/DL (ref 8.5–10.1)
CALCIUM BLD-MCNC: 8.9 MG/DL (ref 8.5–10.1)
CALCIUM BLD-MCNC: 8.9 MG/DL (ref 8.5–10.1)
CHLORIDE SERPL-SCNC: 100 MMOL/L (ref 98–112)
CHLORIDE SERPL-SCNC: 100 MMOL/L (ref 98–112)
CHLORIDE SERPL-SCNC: 101 MMOL/L (ref 98–112)
CHLORIDE SERPL-SCNC: 102 MMOL/L (ref 98–112)
CHLORIDE SERPL-SCNC: 78 MMOL/L (ref 98–112)
CHLORIDE SERPL-SCNC: 83 MMOL/L (ref 98–112)
CHLORIDE SERPL-SCNC: 90 MMOL/L (ref 98–112)
CHLORIDE SERPL-SCNC: 93 MMOL/L (ref 98–112)
CHLORIDE SERPL-SCNC: 94 MMOL/L (ref 98–112)
CHLORIDE SERPL-SCNC: 94 MMOL/L (ref 98–112)
CHLORIDE SERPL-SCNC: 95 MMOL/L (ref 98–112)
CHLORIDE SERPL-SCNC: 95 MMOL/L (ref 98–112)
CHLORIDE SERPL-SCNC: 96 MMOL/L (ref 98–112)
CHLORIDE SERPL-SCNC: 97 MMOL/L (ref 98–112)
CHLORIDE SERPL-SCNC: 98 MMOL/L (ref 98–112)
CHLORIDE SERPL-SCNC: 99 MMOL/L (ref 98–112)
CO2 SERPL-SCNC: 26 MMOL/L (ref 21–32)
CO2 SERPL-SCNC: 26 MMOL/L (ref 21–32)
CO2 SERPL-SCNC: 27 MMOL/L (ref 21–32)
CO2 SERPL-SCNC: 28 MMOL/L (ref 21–32)
CO2 SERPL-SCNC: 31 MMOL/L (ref 21–32)
CO2 SERPL-SCNC: 31 MMOL/L (ref 21–32)
CO2 SERPL-SCNC: 34 MMOL/L (ref 21–32)
CO2 SERPL-SCNC: 35 MMOL/L (ref 21–32)
CO2 SERPL-SCNC: 36 MMOL/L (ref 21–32)
CO2 SERPL-SCNC: 37 MMOL/L (ref 21–32)
CO2 SERPL-SCNC: 37 MMOL/L (ref 21–32)
CO2 SERPL-SCNC: 38 MMOL/L (ref 21–32)
CO2 SERPL-SCNC: 38 MMOL/L (ref 21–32)
CO2 SERPL-SCNC: 39 MMOL/L (ref 21–32)
CO2 SERPL-SCNC: 39 MMOL/L (ref 21–32)
COHGB MFR BLD: 0.9 % SAT (ref 0–3)
COHGB MFR BLD: 1 % SAT (ref 0–3)
COHGB MFR BLD: 1.2 % SAT (ref 0–3)
COHGB MFR BLD: 1.3 % SAT (ref 0–3)
COHGB MFR BLD: 1.4 % SAT (ref 0–3)
COHGB MFR BLD: 1.5 % SAT (ref 0–3)
COHGB MFR BLD: 1.5 % SAT (ref 0–3)
COHGB MFR BLD: 1.6 % SAT (ref 0–3)
COHGB MFR BLD: 1.7 % SAT (ref 0–3)
COLOR UR AUTO: YELLOW
CREAT BLD-MCNC: 0.22 MG/DL
CREAT BLD-MCNC: 0.23 MG/DL
CREAT BLD-MCNC: 0.25 MG/DL
CREAT BLD-MCNC: 0.28 MG/DL
CREAT BLD-MCNC: 0.29 MG/DL
CREAT BLD-MCNC: 0.3 MG/DL
CREAT BLD-MCNC: 0.33 MG/DL
CREAT BLD-MCNC: 0.35 MG/DL
CREAT BLD-MCNC: 0.35 MG/DL
CREAT BLD-MCNC: 0.36 MG/DL
CREAT BLD-MCNC: 0.37 MG/DL
CREAT BLD-MCNC: 0.37 MG/DL
CREAT BLD-MCNC: 0.38 MG/DL
CREAT BLD-MCNC: 0.38 MG/DL
CREAT BLD-MCNC: 0.4 MG/DL
CREAT BLD-MCNC: 0.42 MG/DL
CREAT BLD-MCNC: 0.46 MG/DL
CREAT BLD-MCNC: 1.73 MG/DL
CREAT BLD-MCNC: 2.05 MG/DL
CREAT BLD-MCNC: 2.22 MG/DL
CREAT BLD-MCNC: 2.51 MG/DL
CREAT BLD-MCNC: 2.94 MG/DL
CREAT BLD-MCNC: 3.41 MG/DL
CRP SERPL-MCNC: 17.9 MG/DL (ref ?–0.3)
CRP SERPL-MCNC: 24.2 MG/DL (ref ?–0.3)
CRP SERPL-MCNC: 36.9 MG/DL (ref ?–0.3)
D DIMER PPP FEU-MCNC: 1.75 UG/ML FEU (ref ?–0.5)
DEPRECATED HBV CORE AB SER IA-ACNC: 701.1 NG/ML
EOSINOPHIL # BLD AUTO: 0.11 X10(3) UL (ref 0–0.7)
EOSINOPHIL # BLD AUTO: 0.13 X10(3) UL (ref 0–0.7)
EOSINOPHIL # BLD AUTO: 0.13 X10(3) UL (ref 0–0.7)
EOSINOPHIL # BLD AUTO: 0.15 X10(3) UL (ref 0–0.7)
EOSINOPHIL # BLD AUTO: 0.23 X10(3) UL (ref 0–0.7)
EOSINOPHIL # BLD AUTO: 0.27 X10(3) UL (ref 0–0.7)
EOSINOPHIL # BLD AUTO: 0.33 X10(3) UL (ref 0–0.7)
EOSINOPHIL # BLD AUTO: 0.48 X10(3) UL (ref 0–0.7)
EOSINOPHIL # BLD AUTO: 0.63 X10(3) UL (ref 0–0.7)
EOSINOPHIL # BLD AUTO: 0.7 X10(3) UL (ref 0–0.7)
EOSINOPHIL # BLD AUTO: 0.72 X10(3) UL (ref 0–0.7)
EOSINOPHIL # BLD AUTO: 0.78 X10(3) UL (ref 0–0.7)
EOSINOPHIL # BLD AUTO: 0.83 X10(3) UL (ref 0–0.7)
EOSINOPHIL # BLD AUTO: 1.01 X10(3) UL (ref 0–0.7)
EOSINOPHIL # BLD AUTO: 1.09 X10(3) UL (ref 0–0.7)
EOSINOPHIL # BLD AUTO: 1.1 X10(3) UL (ref 0–0.7)
EOSINOPHIL # BLD AUTO: 1.14 X10(3) UL (ref 0–0.7)
EOSINOPHIL # BLD AUTO: 1.19 X10(3) UL (ref 0–0.7)
EOSINOPHIL # BLD AUTO: 1.24 X10(3) UL (ref 0–0.7)
EOSINOPHIL # BLD AUTO: 1.26 X10(3) UL (ref 0–0.7)
EOSINOPHIL # BLD AUTO: 1.27 X10(3) UL (ref 0–0.7)
EOSINOPHIL # BLD: 0 X10(3) UL (ref 0–0.7)
EOSINOPHIL # BLD: 1.28 X10(3) UL (ref 0–0.7)
EOSINOPHIL NFR BLD AUTO: 0.7 %
EOSINOPHIL NFR BLD AUTO: 0.8 %
EOSINOPHIL NFR BLD AUTO: 1 %
EOSINOPHIL NFR BLD AUTO: 1.1 %
EOSINOPHIL NFR BLD AUTO: 1.3 %
EOSINOPHIL NFR BLD AUTO: 10.2 %
EOSINOPHIL NFR BLD AUTO: 11.9 %
EOSINOPHIL NFR BLD AUTO: 12.1 %
EOSINOPHIL NFR BLD AUTO: 13 %
EOSINOPHIL NFR BLD AUTO: 13.1 %
EOSINOPHIL NFR BLD AUTO: 13.4 %
EOSINOPHIL NFR BLD AUTO: 14.5 %
EOSINOPHIL NFR BLD AUTO: 2.5 %
EOSINOPHIL NFR BLD AUTO: 2.9 %
EOSINOPHIL NFR BLD AUTO: 4.5 %
EOSINOPHIL NFR BLD AUTO: 6.6 %
EOSINOPHIL NFR BLD AUTO: 8.1 %
EOSINOPHIL NFR BLD AUTO: 8.5 %
EOSINOPHIL NFR BLD AUTO: 8.5 %
EOSINOPHIL NFR BLD AUTO: 8.6 %
EOSINOPHIL NFR BLD AUTO: 9.9 %
EOSINOPHIL NFR BLD: 0 %
EOSINOPHIL NFR BLD: 8 %
ERYTHROCYTE [DISTWIDTH] IN BLOOD BY AUTOMATED COUNT: 12.6 %
ERYTHROCYTE [DISTWIDTH] IN BLOOD BY AUTOMATED COUNT: 12.6 %
ERYTHROCYTE [DISTWIDTH] IN BLOOD BY AUTOMATED COUNT: 12.8 %
ERYTHROCYTE [DISTWIDTH] IN BLOOD BY AUTOMATED COUNT: 12.9 %
ERYTHROCYTE [DISTWIDTH] IN BLOOD BY AUTOMATED COUNT: 13 %
ERYTHROCYTE [DISTWIDTH] IN BLOOD BY AUTOMATED COUNT: 13.1 %
ERYTHROCYTE [DISTWIDTH] IN BLOOD BY AUTOMATED COUNT: 13.2 %
ERYTHROCYTE [DISTWIDTH] IN BLOOD BY AUTOMATED COUNT: 13.3 %
ERYTHROCYTE [DISTWIDTH] IN BLOOD BY AUTOMATED COUNT: 13.4 %
ERYTHROCYTE [DISTWIDTH] IN BLOOD BY AUTOMATED COUNT: 13.4 %
ERYTHROCYTE [DISTWIDTH] IN BLOOD BY AUTOMATED COUNT: 13.5 %
ERYTHROCYTE [DISTWIDTH] IN BLOOD BY AUTOMATED COUNT: 13.5 %
ERYTHROCYTE [DISTWIDTH] IN BLOOD BY AUTOMATED COUNT: 13.6 %
ERYTHROCYTE [DISTWIDTH] IN BLOOD BY AUTOMATED COUNT: 13.8 %
ERYTHROCYTE [DISTWIDTH] IN BLOOD BY AUTOMATED COUNT: 13.8 %
ERYTHROCYTE [DISTWIDTH] IN BLOOD BY AUTOMATED COUNT: 14 %
ERYTHROCYTE [DISTWIDTH] IN BLOOD BY AUTOMATED COUNT: 14.1 %
ERYTHROCYTE [DISTWIDTH] IN BLOOD BY AUTOMATED COUNT: 14.2 %
ERYTHROCYTE [DISTWIDTH] IN BLOOD BY AUTOMATED COUNT: 14.2 %
ERYTHROCYTE [DISTWIDTH] IN BLOOD BY AUTOMATED COUNT: 14.4 %
ERYTHROCYTE [DISTWIDTH] IN BLOOD BY AUTOMATED COUNT: 14.4 %
ERYTHROCYTE [DISTWIDTH] IN BLOOD BY AUTOMATED COUNT: 14.9 %
ERYTHROCYTE [DISTWIDTH] IN BLOOD BY AUTOMATED COUNT: 15.1 %
ERYTHROCYTE [DISTWIDTH] IN BLOOD BY AUTOMATED COUNT: 15.5 %
ERYTHROCYTE [DISTWIDTH] IN BLOOD BY AUTOMATED COUNT: 15.7 %
FIO2: 100 %
FIO2: 60 %
FIO2: 70 %
FIO2: 75 %
FIO2: 80 %
FIO2: 90 %
FIO2: 90 %
GLOBULIN PLAS-MCNC: 4.1 G/DL (ref 2.8–4.4)
GLOBULIN PLAS-MCNC: 4.3 G/DL (ref 2.8–4.4)
GLOBULIN PLAS-MCNC: 4.5 G/DL (ref 2.8–4.4)
GLOBULIN PLAS-MCNC: 4.6 G/DL (ref 2.8–4.4)
GLOBULIN PLAS-MCNC: 4.8 G/DL (ref 2.8–4.4)
GLOBULIN PLAS-MCNC: 4.9 G/DL (ref 2.8–4.4)
GLOBULIN PLAS-MCNC: 5 G/DL (ref 2.8–4.4)
GLOBULIN PLAS-MCNC: 5.1 G/DL (ref 2.8–4.4)
GLOBULIN PLAS-MCNC: 5.2 G/DL (ref 2.8–4.4)
GLOBULIN PLAS-MCNC: 5.4 G/DL (ref 2.8–4.4)
GLUCOSE BLD-MCNC: 105 MG/DL (ref 70–99)
GLUCOSE BLD-MCNC: 115 MG/DL (ref 70–99)
GLUCOSE BLD-MCNC: 117 MG/DL (ref 70–99)
GLUCOSE BLD-MCNC: 117 MG/DL (ref 70–99)
GLUCOSE BLD-MCNC: 118 MG/DL (ref 70–99)
GLUCOSE BLD-MCNC: 125 MG/DL (ref 70–99)
GLUCOSE BLD-MCNC: 128 MG/DL (ref 70–99)
GLUCOSE BLD-MCNC: 129 MG/DL (ref 70–99)
GLUCOSE BLD-MCNC: 131 MG/DL (ref 70–99)
GLUCOSE BLD-MCNC: 134 MG/DL (ref 70–99)
GLUCOSE BLD-MCNC: 134 MG/DL (ref 70–99)
GLUCOSE BLD-MCNC: 135 MG/DL (ref 70–99)
GLUCOSE BLD-MCNC: 136 MG/DL (ref 70–99)
GLUCOSE BLD-MCNC: 137 MG/DL (ref 70–99)
GLUCOSE BLD-MCNC: 142 MG/DL (ref 70–99)
GLUCOSE BLD-MCNC: 142 MG/DL (ref 70–99)
GLUCOSE BLD-MCNC: 143 MG/DL (ref 70–99)
GLUCOSE BLD-MCNC: 144 MG/DL (ref 70–99)
GLUCOSE BLD-MCNC: 145 MG/DL (ref 70–99)
GLUCOSE BLD-MCNC: 147 MG/DL (ref 70–99)
GLUCOSE BLD-MCNC: 148 MG/DL (ref 70–99)
GLUCOSE BLD-MCNC: 150 MG/DL (ref 70–99)
GLUCOSE BLD-MCNC: 152 MG/DL (ref 70–99)
GLUCOSE BLD-MCNC: 157 MG/DL (ref 70–99)
GLUCOSE BLD-MCNC: 158 MG/DL (ref 70–99)
GLUCOSE BLD-MCNC: 158 MG/DL (ref 70–99)
GLUCOSE BLD-MCNC: 163 MG/DL (ref 70–99)
GLUCOSE BLD-MCNC: 164 MG/DL (ref 70–99)
GLUCOSE BLD-MCNC: 165 MG/DL (ref 70–99)
GLUCOSE BLD-MCNC: 166 MG/DL (ref 70–99)
GLUCOSE BLD-MCNC: 166 MG/DL (ref 70–99)
GLUCOSE BLD-MCNC: 168 MG/DL (ref 70–99)
GLUCOSE BLD-MCNC: 168 MG/DL (ref 70–99)
GLUCOSE BLD-MCNC: 169 MG/DL (ref 70–99)
GLUCOSE BLD-MCNC: 171 MG/DL (ref 70–99)
GLUCOSE BLD-MCNC: 172 MG/DL (ref 70–99)
GLUCOSE BLD-MCNC: 172 MG/DL (ref 70–99)
GLUCOSE BLD-MCNC: 175 MG/DL (ref 70–99)
GLUCOSE BLD-MCNC: 175 MG/DL (ref 70–99)
GLUCOSE BLD-MCNC: 176 MG/DL (ref 70–99)
GLUCOSE BLD-MCNC: 177 MG/DL (ref 70–99)
GLUCOSE BLD-MCNC: 178 MG/DL (ref 70–99)
GLUCOSE BLD-MCNC: 179 MG/DL (ref 70–99)
GLUCOSE BLD-MCNC: 183 MG/DL (ref 70–99)
GLUCOSE BLD-MCNC: 183 MG/DL (ref 70–99)
GLUCOSE BLD-MCNC: 184 MG/DL (ref 70–99)
GLUCOSE BLD-MCNC: 185 MG/DL (ref 70–99)
GLUCOSE BLD-MCNC: 186 MG/DL (ref 70–99)
GLUCOSE BLD-MCNC: 188 MG/DL (ref 70–99)
GLUCOSE BLD-MCNC: 189 MG/DL (ref 70–99)
GLUCOSE BLD-MCNC: 190 MG/DL (ref 70–99)
GLUCOSE BLD-MCNC: 190 MG/DL (ref 70–99)
GLUCOSE BLD-MCNC: 192 MG/DL (ref 70–99)
GLUCOSE BLD-MCNC: 193 MG/DL (ref 70–99)
GLUCOSE BLD-MCNC: 195 MG/DL (ref 70–99)
GLUCOSE BLD-MCNC: 199 MG/DL (ref 70–99)
GLUCOSE BLD-MCNC: 200 MG/DL (ref 70–99)
GLUCOSE BLD-MCNC: 200 MG/DL (ref 70–99)
GLUCOSE BLD-MCNC: 202 MG/DL (ref 70–99)
GLUCOSE BLD-MCNC: 203 MG/DL (ref 70–99)
GLUCOSE BLD-MCNC: 205 MG/DL (ref 70–99)
GLUCOSE BLD-MCNC: 205 MG/DL (ref 70–99)
GLUCOSE BLD-MCNC: 206 MG/DL (ref 70–99)
GLUCOSE BLD-MCNC: 207 MG/DL (ref 70–99)
GLUCOSE BLD-MCNC: 207 MG/DL (ref 70–99)
GLUCOSE BLD-MCNC: 212 MG/DL (ref 70–99)
GLUCOSE BLD-MCNC: 213 MG/DL (ref 70–99)
GLUCOSE BLD-MCNC: 214 MG/DL (ref 70–99)
GLUCOSE BLD-MCNC: 216 MG/DL (ref 70–99)
GLUCOSE BLD-MCNC: 218 MG/DL (ref 70–99)
GLUCOSE BLD-MCNC: 220 MG/DL (ref 70–99)
GLUCOSE BLD-MCNC: 223 MG/DL (ref 70–99)
GLUCOSE BLD-MCNC: 223 MG/DL (ref 70–99)
GLUCOSE BLD-MCNC: 224 MG/DL (ref 70–99)
GLUCOSE BLD-MCNC: 226 MG/DL (ref 70–99)
GLUCOSE BLD-MCNC: 227 MG/DL (ref 70–99)
GLUCOSE BLD-MCNC: 229 MG/DL (ref 70–99)
GLUCOSE BLD-MCNC: 230 MG/DL (ref 70–99)
GLUCOSE BLD-MCNC: 231 MG/DL (ref 70–99)
GLUCOSE BLD-MCNC: 233 MG/DL (ref 70–99)
GLUCOSE BLD-MCNC: 234 MG/DL (ref 70–99)
GLUCOSE BLD-MCNC: 234 MG/DL (ref 70–99)
GLUCOSE BLD-MCNC: 237 MG/DL (ref 70–99)
GLUCOSE BLD-MCNC: 240 MG/DL (ref 70–99)
GLUCOSE BLD-MCNC: 242 MG/DL (ref 70–99)
GLUCOSE BLD-MCNC: 246 MG/DL (ref 70–99)
GLUCOSE BLD-MCNC: 249 MG/DL (ref 70–99)
GLUCOSE BLD-MCNC: 250 MG/DL (ref 70–99)
GLUCOSE BLD-MCNC: 251 MG/DL (ref 70–99)
GLUCOSE BLD-MCNC: 251 MG/DL (ref 70–99)
GLUCOSE BLD-MCNC: 252 MG/DL (ref 70–99)
GLUCOSE BLD-MCNC: 252 MG/DL (ref 70–99)
GLUCOSE BLD-MCNC: 253 MG/DL (ref 70–99)
GLUCOSE BLD-MCNC: 254 MG/DL (ref 70–99)
GLUCOSE BLD-MCNC: 257 MG/DL (ref 70–99)
GLUCOSE BLD-MCNC: 258 MG/DL (ref 70–99)
GLUCOSE BLD-MCNC: 263 MG/DL (ref 70–99)
GLUCOSE BLD-MCNC: 265 MG/DL (ref 70–99)
GLUCOSE BLD-MCNC: 270 MG/DL (ref 70–99)
GLUCOSE BLD-MCNC: 271 MG/DL (ref 70–99)
GLUCOSE BLD-MCNC: 272 MG/DL (ref 70–99)
GLUCOSE BLD-MCNC: 279 MG/DL (ref 70–99)
GLUCOSE BLD-MCNC: 281 MG/DL (ref 70–99)
GLUCOSE BLD-MCNC: 282 MG/DL (ref 70–99)
GLUCOSE BLD-MCNC: 284 MG/DL (ref 70–99)
GLUCOSE BLD-MCNC: 286 MG/DL (ref 70–99)
GLUCOSE BLD-MCNC: 286 MG/DL (ref 70–99)
GLUCOSE BLD-MCNC: 287 MG/DL (ref 70–99)
GLUCOSE BLD-MCNC: 288 MG/DL (ref 70–99)
GLUCOSE BLD-MCNC: 296 MG/DL (ref 70–99)
GLUCOSE BLD-MCNC: 296 MG/DL (ref 70–99)
GLUCOSE BLD-MCNC: 297 MG/DL (ref 70–99)
GLUCOSE BLD-MCNC: 303 MG/DL (ref 70–99)
GLUCOSE BLD-MCNC: 310 MG/DL (ref 70–99)
GLUCOSE BLD-MCNC: 313 MG/DL (ref 70–99)
GLUCOSE BLD-MCNC: 317 MG/DL (ref 70–99)
GLUCOSE BLD-MCNC: 337 MG/DL (ref 70–99)
GLUCOSE BLD-MCNC: 341 MG/DL (ref 70–99)
GLUCOSE BLD-MCNC: 367 MG/DL (ref 70–99)
GLUCOSE BLD-MCNC: 376 MG/DL (ref 70–99)
GLUCOSE BLD-MCNC: 390 MG/DL (ref 70–99)
GLUCOSE BLD-MCNC: 400 MG/DL (ref 70–99)
GLUCOSE BLD-MCNC: 448 MG/DL (ref 70–99)
GLUCOSE BLD-MCNC: 458 MG/DL (ref 70–99)
GLUCOSE BLD-MCNC: 59 MG/DL (ref 70–99)
GLUCOSE BLD-MCNC: 62 MG/DL (ref 70–99)
GLUCOSE BLD-MCNC: 76 MG/DL (ref 70–99)
GLUCOSE BLD-MCNC: 94 MG/DL (ref 70–99)
GLUCOSE UR STRIP.AUTO-MCNC: >=500 MG/DL
GLUCOSE UR STRIP.AUTO-MCNC: NEGATIVE MG/DL
HCO3 BLDA-SCNC: 25.5 MEQ/L (ref 22–26)
HCO3 BLDA-SCNC: 26 MEQ/L (ref 22–26)
HCO3 BLDA-SCNC: 31.1 MEQ/L (ref 22–26)
HCO3 BLDA-SCNC: 32.4 MEQ/L (ref 22–26)
HCO3 BLDA-SCNC: 32.8 MEQ/L (ref 22–26)
HCO3 BLDA-SCNC: 34.1 MEQ/L (ref 22–26)
HCO3 BLDA-SCNC: 34.8 MEQ/L (ref 22–26)
HCO3 BLDA-SCNC: 35.3 MEQ/L (ref 22–26)
HCO3 BLDA-SCNC: 36.1 MEQ/L (ref 22–26)
HCO3 BLDA-SCNC: 36.5 MEQ/L (ref 22–26)
HCO3 BLDA-SCNC: 36.8 MEQ/L (ref 22–26)
HCO3 BLDA-SCNC: 37.1 MEQ/L (ref 22–26)
HCO3 BLDA-SCNC: 37.5 MEQ/L (ref 22–26)
HCO3 BLDA-SCNC: 38 MEQ/L (ref 22–26)
HCO3 BLDA-SCNC: 39.5 MEQ/L (ref 22–26)
HCO3 BLDA-SCNC: 40.5 MEQ/L (ref 22–26)
HCO3 BLDA-SCNC: 40.7 MEQ/L (ref 22–26)
HCO3 BLDA-SCNC: 40.7 MEQ/L (ref 22–26)
HCO3 BLDA-SCNC: 40.9 MEQ/L (ref 22–26)
HCO3 BLDA-SCNC: 41.6 MEQ/L (ref 22–26)
HCT VFR BLD AUTO: 26.3 %
HCT VFR BLD AUTO: 27.2 %
HCT VFR BLD AUTO: 27.3 %
HCT VFR BLD AUTO: 27.6 %
HCT VFR BLD AUTO: 29.2 %
HCT VFR BLD AUTO: 29.9 %
HCT VFR BLD AUTO: 29.9 %
HCT VFR BLD AUTO: 30.2 %
HCT VFR BLD AUTO: 30.2 %
HCT VFR BLD AUTO: 30.5 %
HCT VFR BLD AUTO: 30.6 %
HCT VFR BLD AUTO: 30.6 %
HCT VFR BLD AUTO: 31 %
HCT VFR BLD AUTO: 31.3 %
HCT VFR BLD AUTO: 31.4 %
HCT VFR BLD AUTO: 31.6 %
HCT VFR BLD AUTO: 31.6 %
HCT VFR BLD AUTO: 32.2 %
HCT VFR BLD AUTO: 32.2 %
HCT VFR BLD AUTO: 32.4 %
HCT VFR BLD AUTO: 32.9 %
HCT VFR BLD AUTO: 33 %
HCT VFR BLD AUTO: 33.4 %
HCT VFR BLD AUTO: 33.9 %
HCT VFR BLD AUTO: 34.3 %
HCT VFR BLD AUTO: 34.3 %
HCT VFR BLD AUTO: 35 %
HGB BLD-MCNC: 10 G/DL
HGB BLD-MCNC: 10.2 G/DL
HGB BLD-MCNC: 10.2 G/DL
HGB BLD-MCNC: 10.3 G/DL
HGB BLD-MCNC: 10.4 G/DL
HGB BLD-MCNC: 10.5 G/DL
HGB BLD-MCNC: 10.8 G/DL
HGB BLD-MCNC: 10.8 G/DL
HGB BLD-MCNC: 11 G/DL
HGB BLD-MCNC: 11.1 G/DL
HGB BLD-MCNC: 11.3 G/DL
HGB BLD-MCNC: 11.6 G/DL
HGB BLD-MCNC: 11.6 G/DL
HGB BLD-MCNC: 12 G/DL
HGB BLD-MCNC: 8.9 G/DL
HGB BLD-MCNC: 9 G/DL
HGB BLD-MCNC: 9.1 G/DL
HGB BLD-MCNC: 9.1 G/DL
HGB BLD-MCNC: 9.2 G/DL
HGB BLD-MCNC: 9.3 G/DL
HGB BLD-MCNC: 9.3 G/DL
HGB BLD-MCNC: 9.4 G/DL
HGB BLD-MCNC: 9.6 G/DL
HGB BLD-MCNC: 9.7 G/DL
HGB BLD-MCNC: 9.8 G/DL
HGB BLD-MCNC: 9.9 G/DL
IMM GRANULOCYTES # BLD AUTO: 0.03 X10(3) UL (ref 0–1)
IMM GRANULOCYTES # BLD AUTO: 0.03 X10(3) UL (ref 0–1)
IMM GRANULOCYTES # BLD AUTO: 0.04 X10(3) UL (ref 0–1)
IMM GRANULOCYTES # BLD AUTO: 0.05 X10(3) UL (ref 0–1)
IMM GRANULOCYTES # BLD AUTO: 0.06 X10(3) UL (ref 0–1)
IMM GRANULOCYTES # BLD AUTO: 0.07 X10(3) UL (ref 0–1)
IMM GRANULOCYTES # BLD AUTO: 0.07 X10(3) UL (ref 0–1)
IMM GRANULOCYTES # BLD AUTO: 0.1 X10(3) UL (ref 0–1)
IMM GRANULOCYTES # BLD AUTO: 0.11 X10(3) UL (ref 0–1)
IMM GRANULOCYTES # BLD AUTO: 0.13 X10(3) UL (ref 0–1)
IMM GRANULOCYTES # BLD AUTO: 0.13 X10(3) UL (ref 0–1)
IMM GRANULOCYTES # BLD AUTO: 0.15 X10(3) UL (ref 0–1)
IMM GRANULOCYTES # BLD AUTO: 0.17 X10(3) UL (ref 0–1)
IMM GRANULOCYTES # BLD AUTO: 0.18 X10(3) UL (ref 0–1)
IMM GRANULOCYTES # BLD AUTO: 0.25 X10(3) UL (ref 0–1)
IMM GRANULOCYTES NFR BLD: 0.3 %
IMM GRANULOCYTES NFR BLD: 0.4 %
IMM GRANULOCYTES NFR BLD: 0.5 %
IMM GRANULOCYTES NFR BLD: 0.6 %
IMM GRANULOCYTES NFR BLD: 0.7 %
IMM GRANULOCYTES NFR BLD: 0.8 %
IMM GRANULOCYTES NFR BLD: 1.1 %
IMM GRANULOCYTES NFR BLD: 1.2 %
IMM GRANULOCYTES NFR BLD: 1.2 %
IMM GRANULOCYTES NFR BLD: 1.3 %
IMM GRANULOCYTES NFR BLD: 1.3 %
IMM GRANULOCYTES NFR BLD: 1.5 %
IMM GRANULOCYTES NFR BLD: 1.6 %
IMM GRANULOCYTES NFR BLD: 1.7 %
INR BLD: 1.1 (ref 0.8–1.2)
INR BLD: 1.19 (ref 0.8–1.2)
INR BLD: 1.19 (ref 0.8–1.2)
INR BLD: 2.42 (ref 0.8–1.2)
INR BLD: 2.65 (ref 0.8–1.2)
INSP PRESSURE: 27 CM H2O
KETONES UR STRIP.AUTO-MCNC: 20 MG/DL
KETONES UR STRIP.AUTO-MCNC: NEGATIVE MG/DL
LACTATE BLDA-SCNC: 1.7 MMOL/L (ref 0.5–2)
LACTATE BLDA-SCNC: 3 MMOL/L (ref 0.5–2)
LACTATE BLDA-SCNC: 5.2 MMOL/L (ref 0.5–2)
LACTATE BLDA-SCNC: 5.6 MMOL/L (ref 0.5–2)
LACTATE BLDA-SCNC: 5.6 MMOL/L (ref 0.5–2)
LACTATE BLDA-SCNC: 6.4 MMOL/L (ref 0.5–2)
LACTATE BLDA-SCNC: <1.6 MMOL/L (ref 0.5–2)
LDH SERPL L TO P-CCNC: 315 U/L
LEUKOCYTE ESTERASE UR QL STRIP.AUTO: NEGATIVE
LYMPHOCYTES # BLD AUTO: 1.2 X10(3) UL (ref 1–4)
LYMPHOCYTES # BLD AUTO: 1.29 X10(3) UL (ref 1–4)
LYMPHOCYTES # BLD AUTO: 1.34 X10(3) UL (ref 1–4)
LYMPHOCYTES # BLD AUTO: 1.36 X10(3) UL (ref 1–4)
LYMPHOCYTES # BLD AUTO: 1.48 X10(3) UL (ref 1–4)
LYMPHOCYTES # BLD AUTO: 1.61 X10(3) UL (ref 1–4)
LYMPHOCYTES # BLD AUTO: 1.73 X10(3) UL (ref 1–4)
LYMPHOCYTES # BLD AUTO: 1.8 X10(3) UL (ref 1–4)
LYMPHOCYTES # BLD AUTO: 1.8 X10(3) UL (ref 1–4)
LYMPHOCYTES # BLD AUTO: 1.81 X10(3) UL (ref 1–4)
LYMPHOCYTES # BLD AUTO: 1.83 X10(3) UL (ref 1–4)
LYMPHOCYTES # BLD AUTO: 1.89 X10(3) UL (ref 1–4)
LYMPHOCYTES # BLD AUTO: 1.95 X10(3) UL (ref 1–4)
LYMPHOCYTES # BLD AUTO: 2.02 X10(3) UL (ref 1–4)
LYMPHOCYTES # BLD AUTO: 2.04 X10(3) UL (ref 1–4)
LYMPHOCYTES # BLD AUTO: 2.04 X10(3) UL (ref 1–4)
LYMPHOCYTES # BLD AUTO: 2.06 X10(3) UL (ref 1–4)
LYMPHOCYTES # BLD AUTO: 2.1 X10(3) UL (ref 1–4)
LYMPHOCYTES # BLD AUTO: 2.11 X10(3) UL (ref 1–4)
LYMPHOCYTES # BLD AUTO: 2.25 X10(3) UL (ref 1–4)
LYMPHOCYTES # BLD AUTO: 2.34 X10(3) UL (ref 1–4)
LYMPHOCYTES NFR BLD AUTO: 10.5 %
LYMPHOCYTES NFR BLD AUTO: 11.5 %
LYMPHOCYTES NFR BLD AUTO: 15.5 %
LYMPHOCYTES NFR BLD AUTO: 15.6 %
LYMPHOCYTES NFR BLD AUTO: 15.9 %
LYMPHOCYTES NFR BLD AUTO: 17.8 %
LYMPHOCYTES NFR BLD AUTO: 19.1 %
LYMPHOCYTES NFR BLD AUTO: 19.2 %
LYMPHOCYTES NFR BLD AUTO: 19.9 %
LYMPHOCYTES NFR BLD AUTO: 20.5 %
LYMPHOCYTES NFR BLD AUTO: 20.5 %
LYMPHOCYTES NFR BLD AUTO: 21.6 %
LYMPHOCYTES NFR BLD AUTO: 21.9 %
LYMPHOCYTES NFR BLD AUTO: 22.8 %
LYMPHOCYTES NFR BLD AUTO: 22.9 %
LYMPHOCYTES NFR BLD AUTO: 23 %
LYMPHOCYTES NFR BLD AUTO: 23.8 %
LYMPHOCYTES NFR BLD AUTO: 24.2 %
LYMPHOCYTES NFR BLD AUTO: 6.1 %
LYMPHOCYTES NFR BLD AUTO: 9.2 %
LYMPHOCYTES NFR BLD AUTO: 9.2 %
LYMPHOCYTES NFR BLD: 0.26 X10(3) UL (ref 1–4)
LYMPHOCYTES NFR BLD: 0.8 X10(3) UL (ref 1–4)
LYMPHOCYTES NFR BLD: 1 %
LYMPHOCYTES NFR BLD: 1.49 X10(3) UL (ref 1–4)
LYMPHOCYTES NFR BLD: 1.8 X10(3) UL (ref 1–4)
LYMPHOCYTES NFR BLD: 11 %
LYMPHOCYTES NFR BLD: 2.86 X10(3) UL (ref 1–4)
LYMPHOCYTES NFR BLD: 2.95 X10(3) UL (ref 1–4)
LYMPHOCYTES NFR BLD: 5 %
LYMPHOCYTES NFR BLD: 5 %
LYMPHOCYTES NFR BLD: 7 %
LYMPHOCYTES NFR BLD: 7 %
MAGNESIUM SERPL-MCNC: 1.4 MG/DL (ref 1.6–2.6)
MAGNESIUM SERPL-MCNC: 1.5 MG/DL (ref 1.6–2.6)
MAGNESIUM SERPL-MCNC: 1.6 MG/DL (ref 1.6–2.6)
MAGNESIUM SERPL-MCNC: 1.6 MG/DL (ref 1.6–2.6)
MAGNESIUM SERPL-MCNC: 1.7 MG/DL (ref 1.6–2.6)
MAGNESIUM SERPL-MCNC: 1.8 MG/DL (ref 1.6–2.6)
MAGNESIUM SERPL-MCNC: 1.9 MG/DL (ref 1.6–2.6)
MAGNESIUM SERPL-MCNC: 2.4 MG/DL (ref 1.6–2.6)
MAGNESIUM SERPL-MCNC: 2.4 MG/DL (ref 1.6–2.6)
MAGNESIUM SERPL-MCNC: 2.6 MG/DL (ref 1.6–2.6)
MAGNESIUM SERPL-MCNC: 2.6 MG/DL (ref 1.6–2.6)
MCH RBC QN AUTO: 27.7 PG (ref 26–34)
MCH RBC QN AUTO: 28 PG (ref 26–34)
MCH RBC QN AUTO: 28.3 PG (ref 26–34)
MCH RBC QN AUTO: 28.3 PG (ref 26–34)
MCH RBC QN AUTO: 28.6 PG (ref 26–34)
MCH RBC QN AUTO: 28.7 PG (ref 26–34)
MCH RBC QN AUTO: 28.9 PG (ref 26–34)
MCH RBC QN AUTO: 28.9 PG (ref 26–34)
MCH RBC QN AUTO: 29 PG (ref 26–34)
MCH RBC QN AUTO: 29.1 PG (ref 26–34)
MCH RBC QN AUTO: 29.1 PG (ref 26–34)
MCH RBC QN AUTO: 29.2 PG (ref 26–34)
MCH RBC QN AUTO: 29.2 PG (ref 26–34)
MCH RBC QN AUTO: 29.3 PG (ref 26–34)
MCH RBC QN AUTO: 29.3 PG (ref 26–34)
MCH RBC QN AUTO: 29.4 PG (ref 26–34)
MCH RBC QN AUTO: 29.5 PG (ref 26–34)
MCH RBC QN AUTO: 29.6 PG (ref 26–34)
MCH RBC QN AUTO: 29.6 PG (ref 26–34)
MCH RBC QN AUTO: 30 PG (ref 26–34)
MCH RBC QN AUTO: 30 PG (ref 26–34)
MCH RBC QN AUTO: 31 PG (ref 26–34)
MCH RBC QN AUTO: 33.2 PG (ref 26–34)
MCHC RBC AUTO-ENTMCNC: 30.4 G/DL (ref 31–37)
MCHC RBC AUTO-ENTMCNC: 30.4 G/DL (ref 31–37)
MCHC RBC AUTO-ENTMCNC: 31 G/DL (ref 31–37)
MCHC RBC AUTO-ENTMCNC: 31 G/DL (ref 31–37)
MCHC RBC AUTO-ENTMCNC: 31.2 G/DL (ref 31–37)
MCHC RBC AUTO-ENTMCNC: 31.3 G/DL (ref 31–37)
MCHC RBC AUTO-ENTMCNC: 31.4 G/DL (ref 31–37)
MCHC RBC AUTO-ENTMCNC: 31.4 G/DL (ref 31–37)
MCHC RBC AUTO-ENTMCNC: 31.5 G/DL (ref 31–37)
MCHC RBC AUTO-ENTMCNC: 32.1 G/DL (ref 31–37)
MCHC RBC AUTO-ENTMCNC: 32.3 G/DL (ref 31–37)
MCHC RBC AUTO-ENTMCNC: 32.4 G/DL (ref 31–37)
MCHC RBC AUTO-ENTMCNC: 32.6 G/DL (ref 31–37)
MCHC RBC AUTO-ENTMCNC: 32.8 G/DL (ref 31–37)
MCHC RBC AUTO-ENTMCNC: 33.3 G/DL (ref 31–37)
MCHC RBC AUTO-ENTMCNC: 33.8 G/DL (ref 31–37)
MCHC RBC AUTO-ENTMCNC: 33.9 G/DL (ref 31–37)
MCHC RBC AUTO-ENTMCNC: 36.4 G/DL (ref 31–37)
MCV RBC AUTO: 85.9 FL
MCV RBC AUTO: 86.9 FL
MCV RBC AUTO: 87.9 FL
MCV RBC AUTO: 89.6 FL
MCV RBC AUTO: 90 FL
MCV RBC AUTO: 90.3 FL
MCV RBC AUTO: 90.4 FL
MCV RBC AUTO: 90.5 FL
MCV RBC AUTO: 90.7 FL
MCV RBC AUTO: 90.9 FL
MCV RBC AUTO: 90.9 FL
MCV RBC AUTO: 91.2 FL
MCV RBC AUTO: 91.3 FL
MCV RBC AUTO: 91.4 FL
MCV RBC AUTO: 91.4 FL
MCV RBC AUTO: 91.5 FL
MCV RBC AUTO: 91.6 FL
MCV RBC AUTO: 91.7 FL
MCV RBC AUTO: 92 FL
MCV RBC AUTO: 92.1 FL
MCV RBC AUTO: 92.7 FL
MCV RBC AUTO: 92.7 FL
MCV RBC AUTO: 92.8 FL
METAMYELOCYTES # BLD: 0.26 X10(3) UL
METAMYELOCYTES # BLD: 0.72 X10(3) UL
METAMYELOCYTES # BLD: 0.84 X10(3) UL
METAMYELOCYTES NFR BLD: 1 %
METAMYELOCYTES NFR BLD: 2 %
METAMYELOCYTES NFR BLD: 2 %
METHGB MFR BLD: 0.5 % SAT (ref 0.4–1.5)
METHGB MFR BLD: 0.6 % SAT (ref 0.4–1.5)
METHGB MFR BLD: 0.7 % SAT (ref 0.4–1.5)
METHGB MFR BLD: 0.8 % SAT (ref 0.4–1.5)
METHGB MFR BLD: 0.8 % SAT (ref 0.4–1.5)
MONOCYTES # BLD AUTO: 0.55 X10(3) UL (ref 0.1–1)
MONOCYTES # BLD AUTO: 0.59 X10(3) UL (ref 0.1–1)
MONOCYTES # BLD AUTO: 0.61 X10(3) UL (ref 0.1–1)
MONOCYTES # BLD AUTO: 0.62 X10(3) UL (ref 0.1–1)
MONOCYTES # BLD AUTO: 0.68 X10(3) UL (ref 0.1–1)
MONOCYTES # BLD AUTO: 0.7 X10(3) UL (ref 0.1–1)
MONOCYTES # BLD AUTO: 0.72 X10(3) UL (ref 0.1–1)
MONOCYTES # BLD AUTO: 0.72 X10(3) UL (ref 0.1–1)
MONOCYTES # BLD AUTO: 0.77 X10(3) UL (ref 0.1–1)
MONOCYTES # BLD AUTO: 0.79 X10(3) UL (ref 0.1–1)
MONOCYTES # BLD AUTO: 0.88 X10(3) UL (ref 0.1–1)
MONOCYTES # BLD AUTO: 0.89 X10(3) UL (ref 0.1–1)
MONOCYTES # BLD AUTO: 0.92 X10(3) UL (ref 0.1–1)
MONOCYTES # BLD AUTO: 0.94 X10(3) UL (ref 0.1–1)
MONOCYTES # BLD AUTO: 0.96 X10(3) UL (ref 0.1–1)
MONOCYTES # BLD AUTO: 0.98 X10(3) UL (ref 0.1–1)
MONOCYTES # BLD AUTO: 0.99 X10(3) UL (ref 0.1–1)
MONOCYTES # BLD AUTO: 1.01 X10(3) UL (ref 0.1–1)
MONOCYTES # BLD AUTO: 1.05 X10(3) UL (ref 0.1–1)
MONOCYTES # BLD AUTO: 1.08 X10(3) UL (ref 0.1–1)
MONOCYTES # BLD AUTO: 1.37 X10(3) UL (ref 0.1–1)
MONOCYTES # BLD: 0.16 X10(3) UL (ref 0.1–1)
MONOCYTES # BLD: 0.42 X10(3) UL (ref 0.1–1)
MONOCYTES # BLD: 1.49 X10(3) UL (ref 0.1–1)
MONOCYTES # BLD: 2.08 X10(3) UL (ref 0.1–1)
MONOCYTES # BLD: 2.51 X10(3) UL (ref 0.1–1)
MONOCYTES # BLD: 3.39 X10(3) UL (ref 0.1–1)
MONOCYTES NFR BLD AUTO: 10 %
MONOCYTES NFR BLD AUTO: 10 %
MONOCYTES NFR BLD AUTO: 11.4 %
MONOCYTES NFR BLD AUTO: 4.8 %
MONOCYTES NFR BLD AUTO: 5.3 %
MONOCYTES NFR BLD AUTO: 6.3 %
MONOCYTES NFR BLD AUTO: 6.6 %
MONOCYTES NFR BLD AUTO: 6.7 %
MONOCYTES NFR BLD AUTO: 6.7 %
MONOCYTES NFR BLD AUTO: 6.9 %
MONOCYTES NFR BLD AUTO: 7.2 %
MONOCYTES NFR BLD AUTO: 7.2 %
MONOCYTES NFR BLD AUTO: 7.3 %
MONOCYTES NFR BLD AUTO: 8 %
MONOCYTES NFR BLD AUTO: 8.1 %
MONOCYTES NFR BLD AUTO: 8.4 %
MONOCYTES NFR BLD AUTO: 8.5 %
MONOCYTES NFR BLD AUTO: 8.6 %
MONOCYTES NFR BLD AUTO: 8.6 %
MONOCYTES NFR BLD AUTO: 9.3 %
MONOCYTES NFR BLD AUTO: 9.4 %
MONOCYTES NFR BLD: 1 %
MONOCYTES NFR BLD: 1 %
MONOCYTES NFR BLD: 13 %
MONOCYTES NFR BLD: 7 %
MONOCYTES NFR BLD: 7 %
MONOCYTES NFR BLD: 8 %
MYELOCYTES # BLD: 0.21 X10(3) UL
MYELOCYTES # BLD: 0.52 X10(3) UL
MYELOCYTES # BLD: 0.84 X10(3) UL
MYELOCYTES NFR BLD: 1 %
MYELOCYTES NFR BLD: 2 %
MYELOCYTES NFR BLD: 2 %
NEUTROPHILS # BLD AUTO: 12.1 X10 (3) UL (ref 1.5–7.7)
NEUTROPHILS # BLD AUTO: 12.1 X10(3) UL (ref 1.5–7.7)
NEUTROPHILS # BLD AUTO: 12.76 X10 (3) UL (ref 1.5–7.7)
NEUTROPHILS # BLD AUTO: 13.29 X10 (3) UL (ref 1.5–7.7)
NEUTROPHILS # BLD AUTO: 13.29 X10(3) UL (ref 1.5–7.7)
NEUTROPHILS # BLD AUTO: 17.9 X10 (3) UL (ref 1.5–7.7)
NEUTROPHILS # BLD AUTO: 18.57 X10 (3) UL (ref 1.5–7.7)
NEUTROPHILS # BLD AUTO: 18.57 X10(3) UL (ref 1.5–7.7)
NEUTROPHILS # BLD AUTO: 20.59 X10 (3) UL (ref 1.5–7.7)
NEUTROPHILS # BLD AUTO: 22.24 X10 (3) UL (ref 1.5–7.7)
NEUTROPHILS # BLD AUTO: 28.93 X10 (3) UL (ref 1.5–7.7)
NEUTROPHILS # BLD AUTO: 33.98 X10 (3) UL (ref 1.5–7.7)
NEUTROPHILS # BLD AUTO: 4.05 X10 (3) UL (ref 1.5–7.7)
NEUTROPHILS # BLD AUTO: 4.05 X10(3) UL (ref 1.5–7.7)
NEUTROPHILS # BLD AUTO: 4.18 X10 (3) UL (ref 1.5–7.7)
NEUTROPHILS # BLD AUTO: 4.18 X10(3) UL (ref 1.5–7.7)
NEUTROPHILS # BLD AUTO: 4.56 X10 (3) UL (ref 1.5–7.7)
NEUTROPHILS # BLD AUTO: 4.56 X10(3) UL (ref 1.5–7.7)
NEUTROPHILS # BLD AUTO: 4.75 X10 (3) UL (ref 1.5–7.7)
NEUTROPHILS # BLD AUTO: 4.75 X10(3) UL (ref 1.5–7.7)
NEUTROPHILS # BLD AUTO: 4.78 X10 (3) UL (ref 1.5–7.7)
NEUTROPHILS # BLD AUTO: 4.78 X10(3) UL (ref 1.5–7.7)
NEUTROPHILS # BLD AUTO: 5.04 X10 (3) UL (ref 1.5–7.7)
NEUTROPHILS # BLD AUTO: 5.04 X10(3) UL (ref 1.5–7.7)
NEUTROPHILS # BLD AUTO: 5.19 X10 (3) UL (ref 1.5–7.7)
NEUTROPHILS # BLD AUTO: 5.19 X10(3) UL (ref 1.5–7.7)
NEUTROPHILS # BLD AUTO: 5.54 X10 (3) UL (ref 1.5–7.7)
NEUTROPHILS # BLD AUTO: 5.54 X10(3) UL (ref 1.5–7.7)
NEUTROPHILS # BLD AUTO: 5.61 X10 (3) UL (ref 1.5–7.7)
NEUTROPHILS # BLD AUTO: 5.61 X10(3) UL (ref 1.5–7.7)
NEUTROPHILS # BLD AUTO: 6.82 X10 (3) UL (ref 1.5–7.7)
NEUTROPHILS # BLD AUTO: 6.82 X10(3) UL (ref 1.5–7.7)
NEUTROPHILS # BLD AUTO: 6.88 X10 (3) UL (ref 1.5–7.7)
NEUTROPHILS # BLD AUTO: 6.88 X10(3) UL (ref 1.5–7.7)
NEUTROPHILS # BLD AUTO: 7.12 X10 (3) UL (ref 1.5–7.7)
NEUTROPHILS # BLD AUTO: 7.12 X10(3) UL (ref 1.5–7.7)
NEUTROPHILS # BLD AUTO: 7.35 X10 (3) UL (ref 1.5–7.7)
NEUTROPHILS # BLD AUTO: 7.35 X10(3) UL (ref 1.5–7.7)
NEUTROPHILS # BLD AUTO: 7.88 X10 (3) UL (ref 1.5–7.7)
NEUTROPHILS # BLD AUTO: 7.88 X10(3) UL (ref 1.5–7.7)
NEUTROPHILS # BLD AUTO: 8.08 X10 (3) UL (ref 1.5–7.7)
NEUTROPHILS # BLD AUTO: 8.08 X10(3) UL (ref 1.5–7.7)
NEUTROPHILS # BLD AUTO: 8.5 X10 (3) UL (ref 1.5–7.7)
NEUTROPHILS # BLD AUTO: 8.5 X10(3) UL (ref 1.5–7.7)
NEUTROPHILS # BLD AUTO: 9.04 X10 (3) UL (ref 1.5–7.7)
NEUTROPHILS # BLD AUTO: 9.04 X10(3) UL (ref 1.5–7.7)
NEUTROPHILS # BLD AUTO: 9.61 X10 (3) UL (ref 1.5–7.7)
NEUTROPHILS # BLD AUTO: 9.61 X10(3) UL (ref 1.5–7.7)
NEUTROPHILS NFR BLD AUTO: 51.5 %
NEUTROPHILS NFR BLD AUTO: 51.7 %
NEUTROPHILS NFR BLD AUTO: 53.3 %
NEUTROPHILS NFR BLD AUTO: 54.4 %
NEUTROPHILS NFR BLD AUTO: 54.8 %
NEUTROPHILS NFR BLD AUTO: 56.1 %
NEUTROPHILS NFR BLD AUTO: 59.9 %
NEUTROPHILS NFR BLD AUTO: 62 %
NEUTROPHILS NFR BLD AUTO: 62 %
NEUTROPHILS NFR BLD AUTO: 63.7 %
NEUTROPHILS NFR BLD AUTO: 64.9 %
NEUTROPHILS NFR BLD AUTO: 66.1 %
NEUTROPHILS NFR BLD AUTO: 68.3 %
NEUTROPHILS NFR BLD AUTO: 69 %
NEUTROPHILS NFR BLD AUTO: 70.2 %
NEUTROPHILS NFR BLD AUTO: 72.5 %
NEUTROPHILS NFR BLD AUTO: 73.5 %
NEUTROPHILS NFR BLD AUTO: 74.1 %
NEUTROPHILS NFR BLD AUTO: 81.5 %
NEUTROPHILS NFR BLD AUTO: 82.3 %
NEUTROPHILS NFR BLD AUTO: 84.7 %
NEUTROPHILS NFR BLD: 54 %
NEUTROPHILS NFR BLD: 63 %
NEUTROPHILS NFR BLD: 69 %
NEUTROPHILS NFR BLD: 71 %
NEUTROPHILS NFR BLD: 74 %
NEUTROPHILS NFR BLD: 81 %
NEUTS BAND NFR BLD: 14 %
NEUTS BAND NFR BLD: 15 %
NEUTS BAND NFR BLD: 15 %
NEUTS BAND NFR BLD: 22 %
NEUTS BAND NFR BLD: 25 %
NEUTS BAND NFR BLD: 5 %
NEUTS HYPERSEG # BLD: 13.76 X10(3) UL (ref 1.5–7.7)
NEUTS HYPERSEG # BLD: 17.89 X10(3) UL (ref 1.5–7.7)
NEUTS HYPERSEG # BLD: 20.54 X10(3) UL (ref 1.5–7.7)
NEUTS HYPERSEG # BLD: 22.19 X10(3) UL (ref 1.5–7.7)
NEUTS HYPERSEG # BLD: 30.87 X10(3) UL (ref 1.5–7.7)
NEUTS HYPERSEG # BLD: 37.05 X10(3) UL (ref 1.5–7.7)
NEUTS VAC BLD QL SMEAR: PRESENT
NEUTS VAC BLD QL SMEAR: PRESENT
NITRITE UR QL STRIP.AUTO: NEGATIVE
NITRITE UR QL STRIP.AUTO: NEGATIVE
NRBC BLD MANUAL-RTO: 1 %
NRBC BLD MANUAL-RTO: 15 %
NRBC BLD MANUAL-RTO: 2 %
NRBC BLD MANUAL-RTO: 2 %
OSMOLALITY SERPL CALC.SUM OF ELEC: 282 MOSM/KG (ref 275–295)
OSMOLALITY SERPL CALC.SUM OF ELEC: 286 MOSM/KG (ref 275–295)
OSMOLALITY SERPL CALC.SUM OF ELEC: 287 MOSM/KG (ref 275–295)
OSMOLALITY SERPL CALC.SUM OF ELEC: 289 MOSM/KG (ref 275–295)
OSMOLALITY SERPL CALC.SUM OF ELEC: 290 MOSM/KG (ref 275–295)
OSMOLALITY SERPL CALC.SUM OF ELEC: 290 MOSM/KG (ref 275–295)
OSMOLALITY SERPL CALC.SUM OF ELEC: 291 MOSM/KG (ref 275–295)
OSMOLALITY SERPL CALC.SUM OF ELEC: 291 MOSM/KG (ref 275–295)
OSMOLALITY SERPL CALC.SUM OF ELEC: 292 MOSM/KG (ref 275–295)
OSMOLALITY SERPL CALC.SUM OF ELEC: 293 MOSM/KG (ref 275–295)
OSMOLALITY SERPL CALC.SUM OF ELEC: 295 MOSM/KG (ref 275–295)
OSMOLALITY SERPL CALC.SUM OF ELEC: 297 MOSM/KG (ref 275–295)
OSMOLALITY SERPL CALC.SUM OF ELEC: 297 MOSM/KG (ref 275–295)
OSMOLALITY SERPL CALC.SUM OF ELEC: 302 MOSM/KG (ref 275–295)
OSMOLALITY SERPL CALC.SUM OF ELEC: 303 MOSM/KG (ref 275–295)
OSMOLALITY SERPL CALC.SUM OF ELEC: 304 MOSM/KG (ref 275–295)
OSMOLALITY SERPL CALC.SUM OF ELEC: 313 MOSM/KG (ref 275–295)
OSMOLALITY SERPL CALC.SUM OF ELEC: 313 MOSM/KG (ref 275–295)
OSMOLALITY SERPL CALC.SUM OF ELEC: 317 MOSM/KG (ref 275–295)
P/F RATIO: 123 MMHG
P/F RATIO: 126.1 MMHG
P/F RATIO: 141.2 MMHG
P/F RATIO: 143.2 MMHG
P/F RATIO: 219.3 MMHG
P/F RATIO: 73.4 MMHG
P/F RATIO: 86.6 MMHG
P/F RATIO: 91.7 MMHG
P/F RATIO: 94.5 MMHG
PAW @ PEAK INSP FLOW SETTING VENT: 27 CM H2O
PAW @ PEAK INSP FLOW SETTING VENT: 32 CM H2O
PAW @ PEAK INSP FLOW SETTING VENT: 35 CM H2O
PAW @ PEAK INSP FLOW SETTING VENT: 35 CM H2O
PAW @ PEAK INSP FLOW SETTING VENT: 37 CM H2O
PAW @ PEAK INSP FLOW SETTING VENT: 40 CM H2O
PCO2 BLDA: 103 MM HG (ref 35–45)
PCO2 BLDA: 52 MM HG (ref 35–45)
PCO2 BLDA: 54 MM HG (ref 35–45)
PCO2 BLDA: 62 MM HG (ref 35–45)
PCO2 BLDA: 63 MM HG (ref 35–45)
PCO2 BLDA: 65 MM HG (ref 35–45)
PCO2 BLDA: 68 MM HG (ref 35–45)
PCO2 BLDA: 68 MM HG (ref 35–45)
PCO2 BLDA: 69 MM HG (ref 35–45)
PCO2 BLDA: 70 MM HG (ref 35–45)
PCO2 BLDA: 72 MM HG (ref 35–45)
PCO2 BLDA: 76 MM HG (ref 35–45)
PCO2 BLDA: 83 MM HG (ref 35–45)
PCO2 BLDA: 84 MM HG (ref 35–45)
PCO2 BLDA: 84 MM HG (ref 35–45)
PCO2 BLDA: 86 MM HG (ref 35–45)
PCO2 BLDA: 93 MM HG (ref 35–45)
PEEP: 10 CM H2O
PEEP: 5 CM H2O
PEEP: 8 CM H2O
PH BLDA: 7.15 [PH] (ref 7.35–7.45)
PH BLDA: 7.2 [PH] (ref 7.35–7.45)
PH BLDA: 7.21 [PH] (ref 7.35–7.45)
PH BLDA: 7.21 [PH] (ref 7.35–7.45)
PH BLDA: 7.23 [PH] (ref 7.35–7.45)
PH BLDA: 7.23 [PH] (ref 7.35–7.45)
PH BLDA: 7.24 [PH] (ref 7.35–7.45)
PH BLDA: 7.25 [PH] (ref 7.35–7.45)
PH BLDA: 7.32 [PH] (ref 7.35–7.45)
PH BLDA: 7.34 [PH] (ref 7.35–7.45)
PH BLDA: 7.38 [PH] (ref 7.35–7.45)
PH BLDA: 7.38 [PH] (ref 7.35–7.45)
PH BLDA: 7.39 [PH] (ref 7.35–7.45)
PH BLDA: 7.4 [PH] (ref 7.35–7.45)
PH BLDA: 7.4 [PH] (ref 7.35–7.45)
PH BLDA: 7.42 [PH] (ref 7.35–7.45)
PH BLDA: 7.43 [PH] (ref 7.35–7.45)
PH BLDA: 7.46 [PH] (ref 7.35–7.45)
PH UR STRIP.AUTO: 5 [PH] (ref 5–8)
PH UR STRIP.AUTO: 5 [PH] (ref 5–8)
PHOSPHATE SERPL-MCNC: 10.4 MG/DL (ref 2.5–4.9)
PHOSPHATE SERPL-MCNC: 2.1 MG/DL (ref 2.5–4.9)
PHOSPHATE SERPL-MCNC: 2.4 MG/DL (ref 2.5–4.9)
PHOSPHATE SERPL-MCNC: 2.5 MG/DL (ref 2.5–4.9)
PHOSPHATE SERPL-MCNC: 2.5 MG/DL (ref 2.5–4.9)
PHOSPHATE SERPL-MCNC: 2.6 MG/DL (ref 2.5–4.9)
PHOSPHATE SERPL-MCNC: 2.6 MG/DL (ref 2.5–4.9)
PHOSPHATE SERPL-MCNC: 2.8 MG/DL (ref 2.5–4.9)
PHOSPHATE SERPL-MCNC: 2.9 MG/DL (ref 2.5–4.9)
PHOSPHATE SERPL-MCNC: 2.9 MG/DL (ref 2.5–4.9)
PHOSPHATE SERPL-MCNC: 3 MG/DL (ref 2.5–4.9)
PHOSPHATE SERPL-MCNC: 3 MG/DL (ref 2.5–4.9)
PHOSPHATE SERPL-MCNC: 3.1 MG/DL (ref 2.5–4.9)
PHOSPHATE SERPL-MCNC: 3.1 MG/DL (ref 2.5–4.9)
PHOSPHATE SERPL-MCNC: 3.3 MG/DL (ref 2.5–4.9)
PHOSPHATE SERPL-MCNC: 3.4 MG/DL (ref 2.5–4.9)
PHOSPHATE SERPL-MCNC: 3.4 MG/DL (ref 2.5–4.9)
PHOSPHATE SERPL-MCNC: 3.5 MG/DL (ref 2.5–4.9)
PHOSPHATE SERPL-MCNC: 3.6 MG/DL (ref 2.5–4.9)
PHOSPHATE SERPL-MCNC: 3.6 MG/DL (ref 2.5–4.9)
PHOSPHATE SERPL-MCNC: 3.8 MG/DL (ref 2.5–4.9)
PHOSPHATE SERPL-MCNC: 7.5 MG/DL (ref 2.5–4.9)
PHOSPHATE SERPL-MCNC: 8.9 MG/DL (ref 2.5–4.9)
PHOSPHATE SERPL-MCNC: 9.8 MG/DL (ref 2.5–4.9)
PLATELET # BLD AUTO: 232 10(3)UL (ref 150–450)
PLATELET # BLD AUTO: 265 10(3)UL (ref 150–450)
PLATELET # BLD AUTO: 268 10(3)UL (ref 150–450)
PLATELET # BLD AUTO: 278 10(3)UL (ref 150–450)
PLATELET # BLD AUTO: 278 10(3)UL (ref 150–450)
PLATELET # BLD AUTO: 292 10(3)UL (ref 150–450)
PLATELET # BLD AUTO: 295 10(3)UL (ref 150–450)
PLATELET # BLD AUTO: 298 10(3)UL (ref 150–450)
PLATELET # BLD AUTO: 304 10(3)UL (ref 150–450)
PLATELET # BLD AUTO: 304 10(3)UL (ref 150–450)
PLATELET # BLD AUTO: 306 10(3)UL (ref 150–450)
PLATELET # BLD AUTO: 306 10(3)UL (ref 150–450)
PLATELET # BLD AUTO: 320 10(3)UL (ref 150–450)
PLATELET # BLD AUTO: 326 10(3)UL (ref 150–450)
PLATELET # BLD AUTO: 330 10(3)UL (ref 150–450)
PLATELET # BLD AUTO: 330 10(3)UL (ref 150–450)
PLATELET # BLD AUTO: 334 10(3)UL (ref 150–450)
PLATELET # BLD AUTO: 336 10(3)UL (ref 150–450)
PLATELET # BLD AUTO: 343 10(3)UL (ref 150–450)
PLATELET # BLD AUTO: 347 10(3)UL (ref 150–450)
PLATELET # BLD AUTO: 350 10(3)UL (ref 150–450)
PLATELET # BLD AUTO: 353 10(3)UL (ref 150–450)
PLATELET # BLD AUTO: 354 10(3)UL (ref 150–450)
PLATELET # BLD AUTO: 358 10(3)UL (ref 150–450)
PLATELET # BLD AUTO: 358 10(3)UL (ref 150–450)
PLATELET # BLD AUTO: 360 10(3)UL (ref 150–450)
PLATELET # BLD AUTO: 365 10(3)UL (ref 150–450)
PLATELET MORPHOLOGY: NORMAL
PO2 BLDA: 102 MM HG (ref 80–105)
PO2 BLDA: 114 MM HG (ref 80–105)
PO2 BLDA: 131 MM HG (ref 80–105)
PO2 BLDA: 176 MM HG (ref 80–105)
PO2 BLDA: 61 MM HG (ref 80–105)
PO2 BLDA: 70 MM HG (ref 80–105)
PO2 BLDA: 70 MM HG (ref 80–105)
PO2 BLDA: 71 MM HG (ref 80–105)
PO2 BLDA: 73 MM HG (ref 80–105)
PO2 BLDA: 73 MM HG (ref 80–105)
PO2 BLDA: 75 MM HG (ref 80–105)
PO2 BLDA: 78 MM HG (ref 80–105)
PO2 BLDA: 78 MM HG (ref 80–105)
PO2 BLDA: 82 MM HG (ref 80–105)
PO2 BLDA: 83 MM HG (ref 80–105)
PO2 BLDA: 84 MM HG (ref 80–105)
PO2 BLDA: 85 MM HG (ref 80–105)
PO2 BLDA: 87 MM HG (ref 80–105)
PO2 BLDA: 88 MM HG (ref 80–105)
PO2 BLDA: 89 MM HG (ref 80–105)
POTASSIUM BLD-SCNC: 3.2 MMOL/L (ref 3.6–5.1)
POTASSIUM BLD-SCNC: 3.6 MMOL/L (ref 3.6–5.1)
POTASSIUM BLD-SCNC: 3.8 MMOL/L (ref 3.6–5.1)
POTASSIUM BLD-SCNC: 3.8 MMOL/L (ref 3.6–5.1)
POTASSIUM BLD-SCNC: 3.9 MMOL/L (ref 3.6–5.1)
POTASSIUM BLD-SCNC: 4 MMOL/L (ref 3.6–5.1)
POTASSIUM BLD-SCNC: 4.3 MMOL/L (ref 3.6–5.1)
POTASSIUM BLD-SCNC: 4.4 MMOL/L (ref 3.6–5.1)
POTASSIUM BLD-SCNC: 4.4 MMOL/L (ref 3.6–5.1)
POTASSIUM BLD-SCNC: 4.9 MMOL/L (ref 3.6–5.1)
POTASSIUM BLD-SCNC: 6.3 MMOL/L (ref 3.6–5.1)
POTASSIUM BLD-SCNC: 6.4 MMOL/L (ref 3.6–5.1)
POTASSIUM SERPL-SCNC: 3.2 MMOL/L (ref 3.5–5.1)
POTASSIUM SERPL-SCNC: 3.2 MMOL/L (ref 3.5–5.1)
POTASSIUM SERPL-SCNC: 3.3 MMOL/L (ref 3.5–5.1)
POTASSIUM SERPL-SCNC: 3.4 MMOL/L (ref 3.5–5.1)
POTASSIUM SERPL-SCNC: 3.5 MMOL/L (ref 3.5–5.1)
POTASSIUM SERPL-SCNC: 3.6 MMOL/L (ref 3.5–5.1)
POTASSIUM SERPL-SCNC: 3.7 MMOL/L (ref 3.5–5.1)
POTASSIUM SERPL-SCNC: 3.7 MMOL/L (ref 3.5–5.1)
POTASSIUM SERPL-SCNC: 3.8 MMOL/L (ref 3.5–5.1)
POTASSIUM SERPL-SCNC: 3.8 MMOL/L (ref 3.5–5.1)
POTASSIUM SERPL-SCNC: 4 MMOL/L (ref 3.5–5.1)
POTASSIUM SERPL-SCNC: 4 MMOL/L (ref 3.5–5.1)
POTASSIUM SERPL-SCNC: 4.1 MMOL/L (ref 3.5–5.1)
POTASSIUM SERPL-SCNC: 4.1 MMOL/L (ref 3.5–5.1)
POTASSIUM SERPL-SCNC: 4.5 MMOL/L (ref 3.5–5.1)
POTASSIUM SERPL-SCNC: 4.6 MMOL/L (ref 3.5–5.1)
POTASSIUM SERPL-SCNC: 5.5 MMOL/L (ref 3.5–5.1)
POTASSIUM SERPL-SCNC: 5.5 MMOL/L (ref 3.5–5.1)
POTASSIUM SERPL-SCNC: 5.6 MMOL/L (ref 3.5–5.1)
POTASSIUM SERPL-SCNC: 5.6 MMOL/L (ref 3.5–5.1)
POTASSIUM SERPL-SCNC: 5.9 MMOL/L (ref 3.5–5.1)
POTASSIUM SERPL-SCNC: 6.3 MMOL/L (ref 3.5–5.1)
PROCALCITONIN SERPL-MCNC: 1.05 NG/ML (ref ?–0.16)
PROCALCITONIN SERPL-MCNC: 1.12 NG/ML (ref ?–0.16)
PROT SERPL-MCNC: 5.9 G/DL (ref 6.4–8.2)
PROT SERPL-MCNC: 6.1 G/DL (ref 6.4–8.2)
PROT SERPL-MCNC: 6.1 G/DL (ref 6.4–8.2)
PROT SERPL-MCNC: 6.2 G/DL (ref 6.4–8.2)
PROT SERPL-MCNC: 6.2 G/DL (ref 6.4–8.2)
PROT SERPL-MCNC: 6.3 G/DL (ref 6.4–8.2)
PROT SERPL-MCNC: 6.3 G/DL (ref 6.4–8.2)
PROT SERPL-MCNC: 6.4 G/DL (ref 6.4–8.2)
PROT SERPL-MCNC: 6.4 G/DL (ref 6.4–8.2)
PROT SERPL-MCNC: 6.5 G/DL (ref 6.4–8.2)
PROT SERPL-MCNC: 6.5 G/DL (ref 6.4–8.2)
PROT SERPL-MCNC: 6.6 G/DL (ref 6.4–8.2)
PROT SERPL-MCNC: 6.6 G/DL (ref 6.4–8.2)
PROT SERPL-MCNC: 6.7 G/DL (ref 6.4–8.2)
PROT SERPL-MCNC: 6.8 G/DL (ref 6.4–8.2)
PROT SERPL-MCNC: 6.8 G/DL (ref 6.4–8.2)
PROT SERPL-MCNC: 6.9 G/DL (ref 6.4–8.2)
PROT SERPL-MCNC: 7 G/DL (ref 6.4–8.2)
PROT SERPL-MCNC: 7.1 G/DL (ref 6.4–8.2)
PROT SERPL-MCNC: 7.2 G/DL (ref 6.4–8.2)
PROT SERPL-MCNC: 7.3 G/DL (ref 6.4–8.2)
PROT UR STRIP.AUTO-MCNC: 30 MG/DL
PROT UR STRIP.AUTO-MCNC: 30 MG/DL
PROTHROMBIN TIME: 14.2 SECONDS (ref 11.6–14.8)
PROTHROMBIN TIME: 15.2 SECONDS (ref 11.6–14.8)
PROTHROMBIN TIME: 15.2 SECONDS (ref 11.6–14.8)
PROTHROMBIN TIME: 26.4 SECONDS (ref 11.6–14.8)
PROTHROMBIN TIME: 28.4 SECONDS (ref 11.6–14.8)
RBC # BLD AUTO: 2.98 X10(6)UL
RBC # BLD AUTO: 3.06 X10(6)UL
RBC # BLD AUTO: 3.14 X10(6)UL
RBC # BLD AUTO: 3.14 X10(6)UL
RBC # BLD AUTO: 3.19 X10(6)UL
RBC # BLD AUTO: 3.29 X10(6)UL
RBC # BLD AUTO: 3.3 X10(6)UL
RBC # BLD AUTO: 3.31 X10(6)UL
RBC # BLD AUTO: 3.34 X10(6)UL
RBC # BLD AUTO: 3.34 X10(6)UL
RBC # BLD AUTO: 3.41 X10(6)UL
RBC # BLD AUTO: 3.43 X10(6)UL
RBC # BLD AUTO: 3.46 X10(6)UL
RBC # BLD AUTO: 3.46 X10(6)UL
RBC # BLD AUTO: 3.5 X10(6)UL
RBC # BLD AUTO: 3.5 X10(6)UL
RBC # BLD AUTO: 3.56 X10(6)UL
RBC # BLD AUTO: 3.58 X10(6)UL
RBC # BLD AUTO: 3.6 X10(6)UL
RBC # BLD AUTO: 3.6 X10(6)UL
RBC # BLD AUTO: 3.64 X10(6)UL
RBC # BLD AUTO: 3.71 X10(6)UL
RBC # BLD AUTO: 3.74 X10(6)UL
RBC # BLD AUTO: 3.81 X10(6)UL
RBC # BLD AUTO: 3.87 X10(6)UL
RBC #/AREA URNS AUTO: >10 /HPF
RBC UR QL AUTO: NEGATIVE
RBC UR QL AUTO: NEGATIVE
SAO2 % BLDA FROM PO2: 100 % (ref 92–100)
SAO2 % BLDA FROM PO2: 83 % (ref 92–100)
SAO2 % BLDA FROM PO2: 92 % (ref 92–100)
SAO2 % BLDA FROM PO2: 93 % (ref 92–100)
SAO2 % BLDA FROM PO2: 94 % (ref 92–100)
SAO2 % BLDA FROM PO2: 94 % (ref 92–100)
SAO2 % BLDA FROM PO2: 95 % (ref 92–100)
SAO2 % BLDA FROM PO2: 96 % (ref 92–100)
SAO2 % BLDA FROM PO2: 97 % (ref 92–100)
SAO2 % BLDA FROM PO2: 98 % (ref 92–100)
SAO2 % BLDA FROM PO2: 99 % (ref 92–100)
SAO2 % BLDA: 82 % (ref 92–100)
SAO2 % BLDA: 88 % (ref 92–100)
SAO2 % BLDA: 89 % (ref 92–100)
SAO2 % BLDA: 90 % (ref 92–100)
SAO2 % BLDA: 92 % (ref 92–100)
SAO2 % BLDA: 93 % (ref 92–100)
SAO2 % BLDA: 94 % (ref 92–100)
SAO2 % BLDA: 95 % (ref 92–100)
SAO2 % BLDA: 95 % (ref 92–100)
SAO2 % BLDA: 96 % (ref 92–100)
SAO2 % BLDA: 97 % (ref 92–100)
SAO2 % BLDA: 97 % (ref 92–100)
SODIUM BLD-SCNC: 130 MMOL/L (ref 136–144)
SODIUM BLD-SCNC: 130 MMOL/L (ref 136–144)
SODIUM BLD-SCNC: 135 MMOL/L (ref 136–144)
SODIUM BLD-SCNC: 135 MMOL/L (ref 136–144)
SODIUM BLD-SCNC: 136 MMOL/L (ref 136–144)
SODIUM BLD-SCNC: 136 MMOL/L (ref 136–144)
SODIUM BLD-SCNC: 137 MMOL/L (ref 136–144)
SODIUM BLD-SCNC: 138 MMOL/L (ref 136–144)
SODIUM BLD-SCNC: 139 MMOL/L (ref 136–144)
SODIUM BLD-SCNC: 140 MMOL/L (ref 136–144)
SODIUM BLD-SCNC: 140 MMOL/L (ref 136–144)
SODIUM BLD-SCNC: 141 MMOL/L (ref 136–144)
SODIUM SERPL-SCNC: 124 MMOL/L (ref 136–145)
SODIUM SERPL-SCNC: 129 MMOL/L (ref 136–145)
SODIUM SERPL-SCNC: 132 MMOL/L (ref 136–145)
SODIUM SERPL-SCNC: 132 MMOL/L (ref 136–145)
SODIUM SERPL-SCNC: 133 MMOL/L (ref 136–145)
SODIUM SERPL-SCNC: 134 MMOL/L (ref 136–145)
SODIUM SERPL-SCNC: 135 MMOL/L (ref 136–145)
SODIUM SERPL-SCNC: 135 MMOL/L (ref 136–145)
SODIUM SERPL-SCNC: 136 MMOL/L (ref 136–145)
SODIUM SERPL-SCNC: 137 MMOL/L (ref 136–145)
SODIUM SERPL-SCNC: 138 MMOL/L (ref 136–145)
SODIUM SERPL-SCNC: 139 MMOL/L (ref 136–145)
SODIUM SERPL-SCNC: 139 MMOL/L (ref 136–145)
SODIUM SERPL-SCNC: 140 MMOL/L (ref 136–145)
SODIUM SERPL-SCNC: 140 MMOL/L (ref 136–145)
SODIUM SERPL-SCNC: 141 MMOL/L (ref 136–145)
SODIUM SERPL-SCNC: 141 MMOL/L (ref 136–145)
SP GR UR STRIP.AUTO: 1.02 (ref 1–1.03)
SP GR UR STRIP.AUTO: >1.03 (ref 1–1.03)
STAPHYLOCOCCUS AUREUS, MRSA BY PCR: DETECTED
TIDAL VOLUME: 400 ML
TOTAL CELLS COUNTED BLD: 100
TOXIC GRANULES BLD QL SMEAR: PRESENT
TOXIC GRANULES BLD QL SMEAR: PRESENT
TRIGL SERPL-MCNC: 168 MG/DL (ref 30–149)
TRIGL SERPL-MCNC: 242 MG/DL (ref 30–149)
TRIGL SERPL-MCNC: 292 MG/DL (ref 30–149)
TRIGL SERPL-MCNC: 318 MG/DL (ref 30–149)
TRIGL SERPL-MCNC: 319 MG/DL (ref 30–149)
UROBILINOGEN UR STRIP.AUTO-MCNC: 2 MG/DL
UROBILINOGEN UR STRIP.AUTO-MCNC: 4 MG/DL
VANCOMYCIN PEAK SERPL-MCNC: 17.3 UG/ML (ref 30–50)
VANCOMYCIN PEAK SERPL-MCNC: 25.5 UG/ML (ref 30–50)
VANCOMYCIN PEAK SERPL-MCNC: 26.6 UG/ML (ref 30–50)
VANCOMYCIN TROUGH SERPL-MCNC: 13.3 UG/ML (ref 10–20)
VANCOMYCIN TROUGH SERPL-MCNC: 13.7 UG/ML (ref 10–20)
VANCOMYCIN TROUGH SERPL-MCNC: 16.8 UG/ML (ref 10–20)
VANCOMYCIN TROUGH SERPL-MCNC: 5.1 UG/ML (ref 10–20)
VANCOMYCIN TROUGH SERPL-MCNC: 9.1 UG/ML (ref 10–20)
VENT RATE: 24 /MIN
VENT RATE: 28 /MIN
VENT RATE: 32 /MIN
VENT RATE: 32 /MIN
VENT RATE: 36 /MIN
WBC # BLD AUTO: 10.5 X10(3) UL (ref 4–11)
WBC # BLD AUTO: 10.6 X10(3) UL (ref 4–11)
WBC # BLD AUTO: 10.7 X10(3) UL (ref 4–11)
WBC # BLD AUTO: 10.8 X10(3) UL (ref 4–11)
WBC # BLD AUTO: 11.4 X10(3) UL (ref 4–11)
WBC # BLD AUTO: 11.7 X10(3) UL (ref 4–11)
WBC # BLD AUTO: 11.9 X10(3) UL (ref 4–11)
WBC # BLD AUTO: 12.3 X10(3) UL (ref 4–11)
WBC # BLD AUTO: 13 X10(3) UL (ref 4–11)
WBC # BLD AUTO: 14.8 X10(3) UL (ref 4–11)
WBC # BLD AUTO: 16 X10(3) UL (ref 4–11)
WBC # BLD AUTO: 16.2 X10(3) UL (ref 4–11)
WBC # BLD AUTO: 21.3 X10(3) UL (ref 4–11)
WBC # BLD AUTO: 21.9 X10(3) UL (ref 4–11)
WBC # BLD AUTO: 26 X10(3) UL (ref 4–11)
WBC # BLD AUTO: 26.1 X10(3) UL (ref 4–11)
WBC # BLD AUTO: 35.9 X10(3) UL (ref 4–11)
WBC # BLD AUTO: 42.1 X10(3) UL (ref 4–11)
WBC # BLD AUTO: 7.4 X10(3) UL (ref 4–11)
WBC # BLD AUTO: 7.6 X10(3) UL (ref 4–11)
WBC # BLD AUTO: 8.4 X10(3) UL (ref 4–11)
WBC # BLD AUTO: 8.8 X10(3) UL (ref 4–11)
WBC # BLD AUTO: 8.9 X10(3) UL (ref 4–11)
WBC # BLD AUTO: 8.9 X10(3) UL (ref 4–11)
WBC # BLD AUTO: 9 X10(3) UL (ref 4–11)
WBC # BLD AUTO: 9.2 X10(3) UL (ref 4–11)
WBC # BLD AUTO: 9.5 X10(3) UL (ref 4–11)
WBC #/AREA URNS AUTO: >50 /HPF
YEAST UR QL: PRESENT /HPF

## 2022-01-01 PROCEDURE — 99232 SBSQ HOSP IP/OBS MODERATE 35: CPT | Performed by: HOSPITALIST

## 2022-01-01 PROCEDURE — 71045 X-RAY EXAM CHEST 1 VIEW: CPT | Performed by: INTERNAL MEDICINE

## 2022-01-01 PROCEDURE — 70450 CT HEAD/BRAIN W/O DYE: CPT | Performed by: INTERNAL MEDICINE

## 2022-01-01 PROCEDURE — 71250 CT THORAX DX C-: CPT | Performed by: INTERNAL MEDICINE

## 2022-01-01 PROCEDURE — 99233 SBSQ HOSP IP/OBS HIGH 50: CPT | Performed by: HOSPITALIST

## 2022-01-01 PROCEDURE — 4A133J1 MONITORING OF ARTERIAL PULSE, PERIPHERAL, PERCUTANEOUS APPROACH: ICD-10-PCS | Performed by: NURSE PRACTITIONER

## 2022-01-01 PROCEDURE — 99291 CRITICAL CARE FIRST HOUR: CPT | Performed by: OTHER

## 2022-01-01 PROCEDURE — 99291 CRITICAL CARE FIRST HOUR: CPT | Performed by: INTERNAL MEDICINE

## 2022-01-01 PROCEDURE — 93306 TTE W/DOPPLER COMPLETE: CPT | Performed by: NURSE PRACTITIONER

## 2022-01-01 PROCEDURE — 93306 TTE W/DOPPLER COMPLETE: CPT | Performed by: INTERNAL MEDICINE

## 2022-01-01 PROCEDURE — 03HY32Z INSERTION OF MONITORING DEVICE INTO UPPER ARTERY, PERCUTANEOUS APPROACH: ICD-10-PCS | Performed by: NURSE PRACTITIONER

## 2022-01-01 PROCEDURE — 4A133B1 MONITORING OF ARTERIAL PRESSURE, PERIPHERAL, PERCUTANEOUS APPROACH: ICD-10-PCS | Performed by: NURSE PRACTITIONER

## 2022-01-01 PROCEDURE — 71045 X-RAY EXAM CHEST 1 VIEW: CPT | Performed by: NURSE PRACTITIONER

## 2022-01-01 PROCEDURE — 99255 IP/OBS CONSLTJ NEW/EST HI 80: CPT | Performed by: INTERNAL MEDICINE

## 2022-01-01 PROCEDURE — 36620 INSERTION CATHETER ARTERY: CPT | Performed by: NURSE PRACTITIONER

## 2022-01-01 PROCEDURE — 95816 EEG AWAKE AND DROWSY: CPT | Performed by: OTHER

## 2022-01-01 PROCEDURE — B548ZZA ULTRASONOGRAPHY OF SUPERIOR VENA CAVA, GUIDANCE: ICD-10-PCS | Performed by: INTERNAL MEDICINE

## 2022-01-01 PROCEDURE — 74176 CT ABD & PELVIS W/O CONTRAST: CPT | Performed by: INTERNAL MEDICINE

## 2022-01-01 PROCEDURE — 02HV33Z INSERTION OF INFUSION DEVICE INTO SUPERIOR VENA CAVA, PERCUTANEOUS APPROACH: ICD-10-PCS | Performed by: INTERNAL MEDICINE

## 2022-01-01 PROCEDURE — 76700 US EXAM ABDOM COMPLETE: CPT | Performed by: INTERNAL MEDICINE

## 2022-01-01 RX ORDER — ENOXAPARIN SODIUM 100 MG/ML
30 INJECTION SUBCUTANEOUS DAILY
Status: DISCONTINUED | OUTPATIENT
Start: 2022-01-01 | End: 2022-01-01

## 2022-01-01 RX ORDER — ENOXAPARIN SODIUM 100 MG/ML
40 INJECTION SUBCUTANEOUS DAILY
Status: DISCONTINUED | OUTPATIENT
Start: 2022-01-01 | End: 2022-01-01

## 2022-01-01 RX ORDER — VANCOMYCIN HYDROCHLORIDE
1250 EVERY 8 HOURS
Status: DISCONTINUED | OUTPATIENT
Start: 2022-01-01 | End: 2022-01-01

## 2022-01-01 RX ORDER — HYDROMORPHONE HYDROCHLORIDE 2 MG/1
2 TABLET ORAL EVERY 6 HOURS
Status: DISCONTINUED | OUTPATIENT
Start: 2022-01-01 | End: 2022-01-01

## 2022-01-01 RX ORDER — CISATRACURIUM BESYLATE 2 MG/ML
0.2 INJECTION INTRAVENOUS ONCE
Status: DISCONTINUED | OUTPATIENT
Start: 2022-01-01 | End: 2022-01-01

## 2022-01-01 RX ORDER — ALBUMIN, HUMAN INJ 5% 5 %
SOLUTION INTRAVENOUS
Status: DISPENSED
Start: 2022-01-01 | End: 2022-01-01

## 2022-01-01 RX ORDER — FUROSEMIDE 10 MG/ML
100 INJECTION INTRAMUSCULAR; INTRAVENOUS ONCE
Status: COMPLETED | OUTPATIENT
Start: 2022-01-01 | End: 2022-01-01

## 2022-01-01 RX ORDER — MIDODRINE HYDROCHLORIDE 10 MG/1
10 TABLET ORAL 3 TIMES DAILY
Status: DISCONTINUED | OUTPATIENT
Start: 2022-01-01 | End: 2022-01-01

## 2022-01-01 RX ORDER — RUFINAMIDE 40 MG/ML
1 SUSPENSION ORAL DAILY
Status: DISCONTINUED | OUTPATIENT
Start: 2022-01-01 | End: 2022-01-01

## 2022-01-01 RX ORDER — VANCOMYCIN 2 GRAM/500 ML IN 0.9 % SODIUM CHLORIDE INTRAVENOUS
2 EVERY 12 HOURS
Status: DISCONTINUED | OUTPATIENT
Start: 2022-01-01 | End: 2022-01-01

## 2022-01-01 RX ORDER — FLUCONAZOLE 2 MG/ML
400 INJECTION, SOLUTION INTRAVENOUS EVERY 24 HOURS
Status: DISCONTINUED | OUTPATIENT
Start: 2022-01-01 | End: 2022-01-01

## 2022-01-01 RX ORDER — POTASSIUM CHLORIDE 1.5 G/1.77G
40 POWDER, FOR SOLUTION ORAL ONCE
Status: COMPLETED | OUTPATIENT
Start: 2022-01-01 | End: 2022-01-01

## 2022-01-01 RX ORDER — HYDROMORPHONE HYDROCHLORIDE 2 MG/1
1 TABLET ORAL EVERY 6 HOURS
Status: DISCONTINUED | OUTPATIENT
Start: 2022-01-01 | End: 2022-01-01

## 2022-01-01 RX ORDER — DEXAMETHASONE SODIUM PHOSPHATE 4 MG/ML
4 VIAL (ML) INJECTION DAILY
Status: DISCONTINUED | OUTPATIENT
Start: 2022-01-01 | End: 2022-01-01

## 2022-01-01 RX ORDER — MIDAZOLAM HYDROCHLORIDE 1 MG/ML
1 INJECTION INTRAMUSCULAR; INTRAVENOUS EVERY 5 MIN PRN
Status: DISCONTINUED | OUTPATIENT
Start: 2022-01-01 | End: 2022-01-01

## 2022-01-01 RX ORDER — FUROSEMIDE 10 MG/ML
20 INJECTION INTRAMUSCULAR; INTRAVENOUS DAILY
Status: DISCONTINUED | OUTPATIENT
Start: 2022-01-01 | End: 2022-01-01

## 2022-01-01 RX ORDER — VANCOMYCIN 2 GRAM/500 ML IN 0.9 % SODIUM CHLORIDE INTRAVENOUS
2000 EVERY 12 HOURS
Status: DISCONTINUED | OUTPATIENT
Start: 2022-01-01 | End: 2022-01-01

## 2022-01-01 RX ORDER — SODIUM CHLORIDE 9 MG/ML
INJECTION, SOLUTION INTRAVENOUS CONTINUOUS
Status: DISCONTINUED | OUTPATIENT
Start: 2022-01-01 | End: 2022-01-01

## 2022-01-01 RX ORDER — SODIUM POLYSTYRENE SULFONATE 4.1 MEQ/G
15 POWDER, FOR SUSPENSION ORAL; RECTAL ONCE
Status: COMPLETED | OUTPATIENT
Start: 2022-01-01 | End: 2022-01-01

## 2022-01-01 RX ORDER — LORAZEPAM 2 MG/1
2 TABLET ORAL EVERY 6 HOURS
Status: DISCONTINUED | OUTPATIENT
Start: 2022-01-01 | End: 2022-01-01

## 2022-01-01 RX ORDER — PHENYLEPHRINE HCL IN 0.9% NACL 50MG/250ML
PLASTIC BAG, INJECTION (ML) INTRAVENOUS CONTINUOUS
Status: DISCONTINUED | OUTPATIENT
Start: 2022-01-01 | End: 2022-01-01

## 2022-01-01 RX ORDER — ALBUMIN (HUMAN) 12.5 G/50ML
25 SOLUTION INTRAVENOUS ONCE
Status: COMPLETED | OUTPATIENT
Start: 2022-01-01 | End: 2022-01-01

## 2022-01-01 RX ORDER — DEXAMETHASONE SODIUM PHOSPHATE 4 MG/ML
4 VIAL (ML) INJECTION DAILY
Status: COMPLETED | OUTPATIENT
Start: 2022-01-01 | End: 2022-01-01

## 2022-01-01 RX ORDER — FUROSEMIDE 10 MG/ML
20 INJECTION INTRAMUSCULAR; INTRAVENOUS
Status: DISCONTINUED | OUTPATIENT
Start: 2022-01-01 | End: 2022-01-01

## 2022-01-01 RX ORDER — VANCOMYCIN HYDROCHLORIDE
1500 EVERY 8 HOURS
Status: DISCONTINUED | OUTPATIENT
Start: 2022-01-01 | End: 2022-01-01

## 2022-01-01 RX ORDER — MINERAL OIL AND PETROLATUM 150; 830 MG/G; MG/G
1 OINTMENT OPHTHALMIC 2 TIMES DAILY
Status: DISCONTINUED | OUTPATIENT
Start: 2022-01-01 | End: 2022-01-01

## 2022-01-01 RX ORDER — VANCOMYCIN/0.9 % SOD CHLORIDE 1.75 G/5
25 PLASTIC BAG, INJECTION (ML) INTRAVENOUS ONCE
Status: DISCONTINUED | OUTPATIENT
Start: 2022-01-01 | End: 2022-01-01 | Stop reason: CLARIF

## 2022-01-01 RX ORDER — MINERAL OIL AND PETROLATUM 150; 830 MG/G; MG/G
1 OINTMENT OPHTHALMIC 3 TIMES DAILY
Status: DISCONTINUED | OUTPATIENT
Start: 2022-01-01 | End: 2022-01-01

## 2022-01-01 RX ORDER — VANCOMYCIN/0.9 % SOD CHLORIDE 1.75 G/5
25 PLASTIC BAG, INJECTION (ML) INTRAVENOUS ONCE
Status: COMPLETED | OUTPATIENT
Start: 2022-01-01 | End: 2022-01-01

## 2022-01-01 RX ORDER — IPRATROPIUM BROMIDE AND ALBUTEROL SULFATE 2.5; .5 MG/3ML; MG/3ML
SOLUTION RESPIRATORY (INHALATION)
Status: COMPLETED
Start: 2022-01-01 | End: 2022-01-01

## 2022-01-01 RX ORDER — ALBUMIN, HUMAN INJ 5% 5 %
250 SOLUTION INTRAVENOUS ONCE
Status: COMPLETED | OUTPATIENT
Start: 2022-01-01 | End: 2022-01-01

## 2022-01-01 RX ORDER — FUROSEMIDE 10 MG/ML
80 INJECTION INTRAMUSCULAR; INTRAVENOUS ONCE
Status: COMPLETED | OUTPATIENT
Start: 2022-01-01 | End: 2022-01-01

## 2022-01-01 RX ORDER — DEXAMETHASONE SODIUM PHOSPHATE 4 MG/ML
2 VIAL (ML) INJECTION DAILY
Status: COMPLETED | OUTPATIENT
Start: 2022-01-01 | End: 2022-01-01

## 2022-01-01 RX ORDER — VANCOMYCIN 2 GRAM/500 ML IN 0.9 % SODIUM CHLORIDE INTRAVENOUS
2000 EVERY 8 HOURS
Status: DISCONTINUED | OUTPATIENT
Start: 2022-01-01 | End: 2022-01-01 | Stop reason: DRUGHIGH

## 2022-01-01 RX ORDER — ENOXAPARIN SODIUM 100 MG/ML
40 INJECTION SUBCUTANEOUS EVERY 24 HOURS
Status: COMPLETED | OUTPATIENT
Start: 2022-01-01 | End: 2022-01-01

## 2022-01-01 RX ORDER — FENTANYL 50 UG/H
1 PATCH TRANSDERMAL
Status: DISCONTINUED | OUTPATIENT
Start: 2022-01-01 | End: 2022-01-01

## 2022-01-01 RX ORDER — FAMOTIDINE 10 MG/ML
20 INJECTION, SOLUTION INTRAVENOUS DAILY
Status: DISCONTINUED | OUTPATIENT
Start: 2022-01-01 | End: 2022-01-01

## 2022-01-01 NOTE — PROGRESS NOTES
Pietro Patient Status:  Inpatient    1975 MRN [id number]   [facility] 4SW-A Attending Marlon Ibarra MD   Hosp Day # 29 PCP PHYSICIAN NONSTAFF     SUBJECTIVE:no new events. Remains intubated and sedated.   Stil injection 50 mcg, 50 mcg, Intravenous, Q30 Min PRN  •  acetaminophen (TYLENOL) tab 650 mg, 650 mg, Oral, Q4H PRN **OR** acetaminophen (TYLENOL) 160 MG/5ML oral liquid 650 mg, 650 mg, Oral, Q4H PRN **OR** acetaminophen (Tylenol) rectal suppository 650 mg, 6 nasal gel, , Nasal, PRN  •  hydrocodone-homatropine (HYDROMET) 5-1.5 MG/5ML syrup 5 mL, 5 mL, Oral, Q4H PRN  •  LORazepam (ATIVAN) injection 0.5 mg, 0.5 mg, Intravenous, Q6H PRN **OR** LORazepam (ATIVAN) injection 1 mg, 1 mg, Intravenous, Q6H PRN  •  sodiu CYNTHIA Not Applicable   SITE Arterial Line   DEV    [facility] 11.4*             COVID-19 Lab Results    COVID-19  Lab Results   Component Value Date    COVID19 Detected (A) 12/04/2021    COVID19 Detected (A) 12/03/2021       Inflammatory Markers  Recent L empiric cefepime bc of recurrent fevers- repeat cultures pending  4. Hypotensive- intermittent. Likely due to heavy sedation; off pressors today  -no evidence of worsening infection nor bleeding  - prn albumin boluses   - ABG without acidosis  5.  Anemia

## 2022-01-01 NOTE — PLAN OF CARE
Patient received in bed. Sedated on propofol, precedex, fentanyl. Cough and gag present. FIO2 75%. Tylenol given for  fever, ice packs, cold towels placed. Tolerating tube feeds at 45 ml/hr. Bilateral wrist restraints. Angel draining and intact.  Will rhonda anxiety  12/17 noc: manage anxiety, wean o2  12/18: wean O2   12/19: wean O2, use IS  12/19: wean O2 , control headache and cough     Interventions:   -prone IS   - independent around the room,   - blood glucose control   - See additional Care Plan goals f

## 2022-01-01 NOTE — PROGRESS NOTES
[facility]                INFECTIOUS DISEASE PROGRESS NOTE    Dano Short Patient Status:  Inpatient    1975 MRN [id number]   [facility] 5NW-A Attending Mariela Topete MD   Hosp Day # 28 PCP PHYSICIAN NONSTAFF     Anti 96* 93* 94*   CO2 41.0* 38.0* 39.0*   ALKPHO 89 111 122*   AST 15 21 17   ALT 39 46 40   BILT 0.2 0.3 0.3   TP 5.8* 5.2* 6.1*       No results found for: [facility] Encounter on 12/03/21   1.  Blood Culture     Status: None    Collectio

## 2022-01-02 NOTE — PROGRESS NOTES
Pietro Patient Status:  Inpatient    1975 MRN [id number]   [facility] 4SW-A Attending Sanjuana Arias MD   Hosp Day # 34 PCP PHYSICIAN NONSTAFF     SUBJECTIVE: this morning, had increase in FiO2 requirements. Inhalation, Q4H PRN  •  fentaNYL citrate (SUBLIMAZE) 0.05 MG/ML injection 25 mcg, 25 mcg, Intravenous, Q30 Min PRN **OR** fentaNYL citrate (SUBLIMAZE) 0.05 MG/ML injection 50 mcg, 50 mcg, Intravenous, Q30 Min PRN  •  acetaminophen (TYLENOL) tab 650 mg, 650 lidocaine-menthol 4-1 % 1 patch, 1 patch, Transdermal, Daily  •  multivitamin with minerals (Thera M Plus) tab 1 tablet, 1 tablet, Oral, Daily  •  ayr saline nasal gel, , Nasal, PRN  •  hydrocodone-homatropine (HYDROMET) 5-1.5 MG/5ML syrup 5 mL, 5 mL, Oral 01/02/2022    TP 6.3 01/02/2022     Recent Labs   Lab 12/30/21  0908   ABGPHT 7.39   SILHAD2F 71*   FQUYC0T 62*   ABGHCO3 41.9*   ABGBE 14.0*   TEMP 67.7   CYNTHIA Not Applicable   SITE Arterial Line   DEV    THGB 11.4*             COVID-19 Lab Results d-dimer- decreasing slowly- repeat tomorrow  3. ID- Possible gram negative pneumonia and now staph bacteremia; MRSA from PICC. Will remove PICC, culture tip; peripheral IVs obtained.   Start vanco- ID notified  - repeat blood cx's tonight  - High PCT- like

## 2022-01-02 NOTE — PLAN OF CARE
Problem: Patient/Family Goals  Goal: Patient/Family Long Term Goal  Description: Patient's Long Term Goal: learn about diabetes before going home    Interventions:  - DM educator to see on Monday.    - given instruction on insulin, accuchecks, etc at [employer] effort  - Oxygen supplementation based on oxygen saturation or ABGs  - Provide Smoking Cessation handout, if applicable  - Encourage broncho-pulmonary hygiene including cough, deep breathe, Incentive Spirometry  - Assess the need for suctioning and perform [place]

## 2022-01-02 NOTE — PLAN OF CARE
Patient continuously monitored on telemetry. Vitals recorded every hour. Medications given per MAR. Sedation weaned as able to maintain ventilator compliance. Per Dr. Petr Smyth, after ABG if PO2 above 60 ok to stay supine.   Patient's family updated with Pl

## 2022-01-02 NOTE — PLAN OF CARE
Assumed care after RN report. Intubated and sedated on fentanyl, precedex and propofol. PRN Versed and fentanyl given, see MAR. Does not withdrawal to pain, pupils reactive, +cough/gag. FiO2 weaned to 60%, peep +8, per pulm no more need for proning.  Wander Benavides

## 2022-01-02 NOTE — CONSULTS
[facility] Dosing Service    Initial Pharmacokinetic Consult for Vancomycin AUC Dosing    Korey Cardenas is a 42year old patient who is being treated for  MRSA bacteremia . Pharmacy has been asked to dose vancomycin by Dr Dary Michele.     Weights:  Ideal anshul

## 2022-01-02 NOTE — PROGRESS NOTES
[facility]                INFECTIOUS DISEASE PROGRESS NOTE    Sera Arrington Patient Status:  Inpatient    1975 MRN [id number]   [facility] 5NW-A Attending Ligia Coates MD   Hosp Day # 34 PCP PHYSICIAN NONSTAFF     Anti 134*   K 4.1 4.1 3.7   CL 93* 94* 94*   CO2 38.0* 39.0* 39.0*   ALKPHO 111 122* 126*   AST 21 17 15   ALT 46 40 32   BILT 0.3 0.3 0.4   TP 5.2* 6.1* 6.3*       No results found for: [facility] Encounter on 12/03/21   1.  Blood Culture

## 2022-01-03 NOTE — PROGRESS NOTES
[facility]     Hospitalist Progress Note     Cooper Lambert Patient Status:  Inpatient    1975 MRN [id number]   [facility] 4SW-A Attending Jayy Marie MD   Hosp Day # 34 PCP PHYSICIAN NONSTAFF     Chief Complaint: sob    Subject input(s): CK in the last 168 hours.     Inflammatory Markers  Recent Labs   Lab 12/27/21  0504 12/27/21  0504 12/28/21  0430 12/28/21  0430 12/29/21  0358 12/30/21  0445 01/01/22  1300   CRP 13.50*   < > 14.20*   < > 11.60* 10.40* 24.20*   FLORENCIO 681.4*   < > endocrinology follow-up  3.  Hyponatremia  1. resolved     Quality:  · DVT Prophylaxis: lovenox 1mg/kg bid  · CODE status: full  · Angel: none  · Central line: none     Will the patient be referred to TCC on discharge?: TBD  Estimated date of discharge: TBD

## 2022-01-03 NOTE — RESPIRATORY THERAPY NOTE
Received patient on VC+ 24/400/80%/+8 in supine position. Patient proned at approximately 1500. ABG drawn per Pulkaushal MARTINEZ, no changes made. FiO2 weaned to 60%, tolerating well. A small amount of thick, tan secretions suctioned via ETT.   Continue nebs as or

## 2022-01-03 NOTE — PROGRESS NOTES
Pietro Patient Status:  Inpatient    1975 MRN [id number]   [facility] 4SW-A Attending Nic Porter MD   [facility] Day # 27 PCP PHYSICIAN NONSTAFF     Critical Care Progress Note     Date of Admission: 12/3/2021 Q30 Min PRN  •  acetaminophen (TYLENOL) tab 650 mg, 650 mg, Oral, Q4H PRN **OR** acetaminophen (TYLENOL) 160 MG/5ML oral liquid 650 mg, 650 mg, Oral, Q4H PRN **OR** acetaminophen (Tylenol) rectal suppository 650 mg, 650 mg, Rectal, Q4H PRN **OR** acetamino hydrocodone-homatropine (HYDROMET) 5-1.5 MG/5ML syrup 5 mL, 5 mL, Oral, Q4H PRN  •  LORazepam (ATIVAN) injection 0.5 mg, 0.5 mg, Intravenous, Q6H PRN **OR** LORazepam (ATIVAN) injection 1 mg, 1 mg, Intravenous, Q6H PRN  •  sodium chloride (SALINE MIST) 1 s and rhythm, normal S1S2, no murmur.                          Abdomen: soft, non-tender, non-distended, positive BS.                         Extremity: No clubbing or cyanosis. no edema                          Skin: No rashes or lesions.        Lab Results infiltrates without PTX   - Off paralytics  - resume proning, had been held in preparation for trach  - on gabapentin to help suppress cough given that he desaturates significantly when coughing  - order for trach/PEG entered. Dr. Garcia Crilily consulted.   Will

## 2022-01-03 NOTE — PLAN OF CARE
Patient received in bed. Intubated, sedated. Tube feedings. FIO2 75%-80%. Angel draining and intact. Propofol, fentanyl, precedex running. Will continue to monitor.   Problem: Patient/Family Goals  Goal: Patient/Family Long Term Goal  Description: Patient's INTERVENTIONS:  - Assess for changes in respiratory status  - Assess for changes in mentation and behavior  - Position to facilitate oxygenation and minimize respiratory effort  - Oxygen supplementation based on oxygen saturation or ABGs  - Provide Smoking in orientation and promotion of home routines  Outcome: Progressing

## 2022-01-03 NOTE — PROGRESS NOTES
[facility] Dosing Service    Follow-up Pharmacokinetic Consult for Vancomycin AUC Dosing     Cooper Lambert is a 42year old patient who is being treated for MRSA bacteremia.   Patient is on day 1 of vancomycin and is currently receiving 1750 mg IV Q 12

## 2022-01-04 NOTE — PROGRESS NOTES
Carmelitasabrina Patient Status:  Inpatient    1975 MRN [id number]   [facility] 4SW-A Attending Dylan Yañez MD   [address] Day # 32 PCP PHYSICIAN NONSTAFF     Critical Care Progress Note     Date of Admission: 12/3/2021 Min PRN **OR** fentaNYL citrate (SUBLIMAZE) 0.05 MG/ML injection 50 mcg, 50 mcg, Intravenous, Q30 Min PRN  •  acetaminophen (TYLENOL) tab 650 mg, 650 mg, Oral, Q4H PRN **OR** acetaminophen (TYLENOL) 160 MG/5ML oral liquid 650 mg, 650 mg, Oral, Q4H PRN **OR tab 1 tablet, 1 tablet, Oral, Daily  •  ayr saline nasal gel, , Nasal, PRN  •  hydrocodone-homatropine (HYDROMET) 5-1.5 MG/5ML syrup 5 mL, 5 mL, Oral, Q4H PRN  •  LORazepam (ATIVAN) injection 0.5 mg, 0.5 mg, Intravenous, Q6H PRN **OR** LORazepam (ATIVAN) i deformity.                         TFJOP: IJWBFNV rate and rhythm, normal S1S2                          Abdomen: soft, non-tender, non-distended, positive BS.                         Extremity: No clubbing or cyanosis.  no edema                          R compliance is slowly improving  - wean PEEP/FiO2 as able  - currently on 80%/+8  - repeat ABG   - CXR with diffuse infiltrates without PTX   - Off paralytics  - proning  - on gabapentin to help suppress cough given that he desaturates significantly when co

## 2022-01-04 NOTE — PLAN OF CARE
FiO2 down from 90% this am to 60%. Re-started proning @ 1600 today. Precedex, Propofol, Fentanyl gtts. Tmax 100. Diuresed >2.5L. Morgan TF. No other issues at this time.

## 2022-01-04 NOTE — PLAN OF CARE
Received pt this AM. Pt received prone-supine this morning-will prone again this evening. Pt down 70% fio2 from 80% this AM. Doing well supine and maintaining higher sats this afternoon. Continue bilateral restraints. Angel care done. Tolerating TF.  Remain

## 2022-01-04 NOTE — PLAN OF CARE
Assumed care with patient proned in bed. Plan to prone overnight and supine in AM. Art. Line. Propofol, Precedex, Fentanyl gtt being titrated for sedation and comfort. Unable to assess orientation, cough and gag present. Intubated. Afebrile. NSR. VSS.  OG c

## 2022-01-04 NOTE — DIETARY NOTE
[address] UP    Pt does not meet malnutrition criteria. NUTRITION INTERVENTION:    1. Enteral Nutrition - via NGT    Vital High Protein at goal rate of 45 ml/hr x 24 hours.     · If no IVF, recommend free water flush of 65 ml q 4 carbohydrates, carbohydrate counting, recommended foods to eat, reading nutrition facts label, discussed meal planning. Pt verbalized understanding. Delivered handout to pt.  Patient reports that he does not have much of an appetite, however he has been con therapy which results in mechanical ventilation as evidenced by need for NPO status    MONITOR AND EVALUATE/NUTRITION GOALS:  1. Weight stable within 1 to 2 lbs during admission - Ongoing  2.  Tolerate alternative nutrition at 100% of goal - Met, continues

## 2022-01-04 NOTE — PROGRESS NOTES
[facility] Dosing Service    Follow-up Pharmacokinetic Consult for Vancomycin AUC Dosing     Vianney Malcolm is a 42year old patient who is being treated for MRSA bacteremia and pneumonia.   Patient is on day 3 of vancomycin and is currently receiving 1

## 2022-01-04 NOTE — PROGRESS NOTES
[facility]                INFECTIOUS DISEASE PROGRESS NOTE    Galindo Medici Patient Status:  Inpatient    1975 MRN [id number]   [facility] 5NW-A Attending Lachelle Ramirez MD   Hosp Day # 27 PCP PHYSICIAN NONSTAFF     Anti  134* 140   K 4.1 3.7 3.5   CL 94* 94* 98   CO2 39.0* 39.0* 36.0*   ALKPHO 122* 126* 191*   AST 17 15 22   ALT 40 32 36   BILT 0.3 0.4 0.3   TP 6.1* 6.3* 5.9*       Vancomycin Trough (ug/mL)   Date Value   01/02/2022 5.1 (L)     Microbiology    Hos

## 2022-01-04 NOTE — PROGRESS NOTES
[facility]     Hospitalist Progress Note     Gerson Garcia Patient Status:  Inpatient    1975 MRN [id number]   [facility] 4SW-A Attending Felisha Darden MD   Hosp Day # 27 PCP PHYSICIAN NONSTAFF     Chief Complaint:navi Diaz input(s): TROP, PBNP in the last 168 hours. Creatinine Kinase  No results for input(s): CK in the last 168 hours.     Inflammatory Markers  Recent Labs   Lab 12/28/21  0430 12/28/21  0430 12/29/21  0358 12/29/21  0358 12/30/21  0445 01/01/22  1300 01/03/ ID  2. Uncontrolled/new DMII - now exacerbated by steroids   a. Add levemir 10mg in AM to existing regimen; will need to watch insulin needs closely as he comes off steroids  b. OP endocrinology follow-up  3.  Hyponatremia  1. resolved     Quality:  · DVT P

## 2022-01-05 NOTE — PLAN OF CARE
Received patient this am sedated and intubated on ventilator. Slight grimace with oral suctioning. Does not withdraw to pain. Cough/gag present. Dilaudid, Precedex, and Fentanyl for sedation. Lungs sound dim. ETT-small thick, tan secretions.  Orally moderat

## 2022-01-05 NOTE — PROGRESS NOTES
[facility]     Hospitalist Progress Note     Flex Anaya Patient Status:  Inpatient    1975 MRN [id number]   [facility] 4SW-A Attending Sarah Dowd MD   [facility] Day # 28 PCP PHYSICIAN NONSTAFF     Chief Complaint: Katelyn Rouse for input(s): CK in the last 168 hours.     Inflammatory Markers  Recent Labs   Lab 12/30/21  0445 01/01/22  1300 01/03/22  0501 01/03/22  1237   CRP 10.40* 24.20* 17.90*  --    FLORENCIO 562.2*  --  701.1*  --    LDH  --   --  315*  --    DDIMER  --   --   --  1 full  · Angel: none  · Central line: none     Will the patient be referred to TCC on discharge?: TBD  Estimated date of discharge: TBD  Discharge is dependent on: clinical progress   At this point Mr. Shirley Turk is expected to be discharge to: ZAINA RAYMOND

## 2022-01-05 NOTE — PROGRESS NOTES
[facility]                INFECTIOUS DISEASE PROGRESS NOTE    Judith Ward Patient Status:  Inpatient    1975 MRN [id number]   [facility] 5NW-A Attending Merrick Coleman MD   Hosp Day # 28 PCP PHYSICIAN NONSTAFF     Anti 6.2*       Vancomycin Trough (ug/mL)   Date Value   01/04/2022 13.3     Microbiology    Hospital Encounter on 12/03/21   1.  Blood Culture     Status: None (Preliminary result)    Collection Time: 01/04/22  4:52 AM    Specimen: Blood,peripheral   Result Lina

## 2022-01-05 NOTE — PLAN OF CARE
Assumed care while patient in bed. Art. Line. Propofol, Precedex, Fentanyl gtts being titrated for sedation and comfort. Unable to assess orientation, cough and gag present. Intubated, increased FiO2 per RT. Afebrile. NSR. VSS.  OG connected to continuous t

## 2022-01-05 NOTE — PROGRESS NOTES
[facility]     Hospitalist Progress Note     Camilo Sutherland Patient Status:  Inpatient    1975 MRN [id number]   [facility] 4SW-A Attending Paula Silverio MD   Hosp Day # 32 PCP PHYSICIAN NONSTAFF     Chief Complaint: Tata Woodward hours.    Creatinine Kinase  No results for input(s): CK in the last 168 hours.     Inflammatory Markers  Recent Labs   Lab 12/29/21  0358 12/29/21  0358 12/30/21  0445 01/01/22  1300 01/03/22  0501 01/03/22  1237   CRP 11.60*   < > 10.40* 24.20* 17.90*  -- needs closely as he comes off steroids  b. OP endocrinology follow-up  3.  Hyponatremia  1. resolved     Quality:  · DVT Prophylaxis: lovenox 1mg/kg bid  · CODE status: full  · Angel: none  · Central line: none     Will the patient be referred to TCC on dis

## 2022-01-05 NOTE — PROGRESS NOTES
[facility]                INFECTIOUS DISEASE PROGRESS NOTE    Chad Iqbal Patient Status:  Inpatient    1975 MRN [id number]   [facility] 5NW-A Attending Paulo Dobbs MD   Hosp Day # 32 PCP PHYSICIAN NONSTAFF     Anti CL 94* 98 99   CO2 39.0* 36.0* 37.0*   ALKPHO 126* 191* 152*   AST 15 22 20   ALT 32 36 31   BILT 0.4 0.3 0.3   TP 6.3* 5.9* 6.2*       Vancomycin Trough (ug/mL)   Date Value   01/04/2022 13.3     Microbiology    Hospital Encounter on 12/03/21   1.  Blood

## 2022-01-05 NOTE — PROGRESS NOTES
Pietro Patient Status:  Inpatient    1975 MRN [id number]   [facility] 4SW-A Attending Dylan Yañez MD   [facility] Day # 28 PCP PHYSICIAN NONSTAFF     Critical Care Progress Note     Date of Admission: 12/3/2021 Intravenous, Q30 Min PRN  •  acetaminophen (TYLENOL) tab 650 mg, 650 mg, Oral, Q4H PRN **OR** acetaminophen (TYLENOL) 160 MG/5ML oral liquid 650 mg, 650 mg, Oral, Q4H PRN **OR** acetaminophen (Tylenol) rectal suppository 650 mg, 650 mg, Rectal, Q4H PRN **O (ATIVAN) injection 0.5 mg, 0.5 mg, Intravenous, Q6H PRN **OR** LORazepam (ATIVAN) injection 1 mg, 1 mg, Intravenous, Q6H PRN  •  sodium chloride (SALINE MIST) 1 spray, 1 spray, Each Nare, Q3H PRN  •  glucose (DEX4) oral liquid 15 g, 15 g, Oral, Q15 Min PRN No clubbing or cyanosis. no edema                          Skin: No rashes or lesions.        Lab Results   Component Value Date    WBC 10.7 01/05/2022    RBC 3.30 01/05/2022    HGB 9.9 01/05/2022    HCT 30.6 01/05/2022    MCV 92.7 01/05/2022    MCH 30.0 01 to wait until FiO2 requirements come down.   2. COVID-19 PNA - symptom onset 11/25; diagnosed 12/3  - unvaccinated  - completed course of dexamethasone and remdesivir, decadron restarted 12/24 due to elevated inflammatory markers and lack of clinical improv

## 2022-01-06 NOTE — PLAN OF CARE
Assumed care while patient in bed. Art line. Intubated. Propofol, Precedex, Fentanyl gtts being titrated for sedation and comfort. Hypertensive and fighting ventilator at beginning of shift, prn medications given.  Unable to assess orientation, cough and ga

## 2022-01-06 NOTE — PROGRESS NOTES
[facility]                INFECTIOUS DISEASE PROGRESS NOTE    Camilo Sutherland Patient Status:  Inpatient    1975 MRN [id number]   [facility] 5NW-A Attending Salena Childers MD   Hosp Day # 35 PCP PHYSICIAN NONSTAFF     Anti Microbiology    Hospital Encounter on 12/03/21   1.  Blood Culture     Status: None (Preliminary result)    Collection Time: 01/04/22  4:52 AM    Specimen: Blood,peripheral   Result Value Ref Range    Blood Culture Result No Growth 2 Days N/A         Ra

## 2022-01-06 NOTE — PLAN OF CARE
Assumed care of patient this am, proned 1100, febrile 101.2, sputum, blood, and urine cultures sent. See EMAR for meds given. Weaning FIO2 as tolerated.

## 2022-01-06 NOTE — PROGRESS NOTES
[facility]     Hospitalist Progress Note     Harithamarilee Moeller Patient Status:  Inpatient    1975 MRN [id number]   [facility] 4SW-A Attending Sanjuana Arias MD   [facility] Day # 35 PCP PHYSICIAN NONSTAFF     Chief Complaint: Felisa Preciado last 168 hours.     Inflammatory Markers  Recent Labs   Lab 01/01/22  1300 01/03/22  0501 01/03/22  1237   CRP 24.20* 17.90*  --    FLORENCIO  --  701.1*  --    LDH  --  315*  --    DDIMER  --   --  1.75*       No results for input(s): TROP, TROPHS, CK in the las TBD  Discharge is dependent on: clinical progress   At this point Mr. Jose Angel Garcia is expected to be discharge to: TBD     Cara Mcgill MD  [facility]  Internal Medicine Hospitalist  Cell [phone]

## 2022-01-07 NOTE — PROGRESS NOTES
[facility]                INFECTIOUS DISEASE PROGRESS NOTE    Marc Shipman Patient Status:  Inpatient    1975 MRN [id number]   [facility] 5NW-A Attending Mario Kaur MD   Hosp Day # 29 PCP PHYSICIAN NONSTAFF     Anti 16.8     Microbiology    Hospital Encounter on 12/03/21   1.  Blood Culture     Status: None (Preliminary result)    Collection Time: 01/04/22  4:52 AM    Specimen: Blood,peripheral   Result Value Ref Range    Blood Culture Result No Growth 3 Days N/A

## 2022-01-07 NOTE — PROGRESS NOTES
[facility] Pulmonary, Critical Care and Sleep    Marc Ramonita Patient Status:  Inpatient    1975 MRN [id number]   [facility] 4SW-A Attending Angeline Lemons MD   [facility] Day # 29 PCP PHYSICIAN NONSTAFF       Date of Admission: 12/3/2021 hydrocodone-homatropine, LORazepam **OR** LORazepam, sodium chloride, glucose **OR** glucose-vitamin C **OR** dextrose **OR** glucose **OR** glucose-vitamin C, melatonin, ondansetron, prochlorperazine, polyethylene glycol (PEG 3350)       OBJECTIVE:  BP 12 COVID19 Detected (A) 12/04/2021    COVID19 Detected (A) 12/03/2021       Pro-Calcitonin  Recent Labs   Lab 01/01/22  1300   PCT 1.05*       Cardiac  No results for input(s): TROP, PBNP in the last 168 hours.     Creatinine Kinase  No results for input(s) questions answered . Palliative care would be appropriate if he is not able to improve.      Critical Care Time greater than: 35 minutes    My best regards,         Callie Salas MD  [facility] Pulmonary, Critical Care and Sleep Medicine

## 2022-01-07 NOTE — PROGRESS NOTES
[facility]     Hospitalist Progress Note     Rg Staff Patient Status:  Inpatient    1975 MRN [id number]   [facility] 4SW-A Attending Hawa Weaver MD   [facility] Day # 29 PCP PHYSICIAN NONSTAFF     Chief Complaint: Indigo Blackburns the last 168 hours.     Inflammatory Markers  Recent Labs   Lab 01/01/22  1300 01/03/22  0501 01/03/22  1237   CRP 24.20* 17.90*  --    FLORENCIO  --  701.1*  --    LDH  --  315*  --    DDIMER  --   --  1.75*       No results for input(s): TROP, TROPHS, CK in the TBD  Discharge is dependent on: clinical progress   At this point Mr. Mechelle Solis is expected to be discharge to: TBD     Helena Moreno MD  [facility]  Internal Medicine Hospitalist  Cell [phone]

## 2022-01-07 NOTE — PLAN OF CARE
Assumed patient care at 0730. Vital signs stable. Patient sedated on ventilator. Received patient prone - patient turned supine at 0400am.  Patient tolerating vent settings. Tube feedings infusing.     Problem: Patient/Family Goals  Goal: Patient/Family ventilation and oxygenation  Description: INTERVENTIONS:  - Assess for changes in respiratory status  - Assess for changes in mentation and behavior  - Position to facilitate oxygenation and minimize respiratory effort  - Oxygen supplementation based on ox interruptions  - Encourage family to assist in orientation and promotion of home routines  Outcome: Progressing

## 2022-01-07 NOTE — PROGRESS NOTES
Received pt prone and vented, VC+ 24/400/75%/+8, tolerating well. FiO2 increased to 100%. Duoneb Q6. Will continue to monitor closely.       01/07/22 0359   Vent Information   $ RT Standby Charge (per 15 min) 1   $ Vent Care / Non-Invasive Subsequent Day Manan Romero

## 2022-01-07 NOTE — CONSULTS
[facility]  Report of Inpatient Wound Care Consultation    Sabrina Tang Patient Status:  Inpatient    1975 MRN [id number]   [facility] 4SW-A Attending Shraddha Johnson MD   Hosp Day # 29 PCP PHYSICIAN NONSTAFF     Reason for Con --   --   --   --   --   --    PGLU  --    < >  --    < >  --    < >  --    < >  --    < > 263*  --  251* 286*    < > = values in this interval not displayed.          ASSESSMENT:  Wound 01/06/22 Penis Anterior;Posterior (Active)   Date First Assessed/Time

## 2022-01-07 NOTE — PROGRESS NOTES
[facility] Dosing Service    Follow-up Pharmacokinetic Consult for Vancomycin AUC Dosing     Whitney Osei is a 42year old patient who is being treated for MRSA bacteremia and pneumonia.   Patient is on day 6 of vancomycin and is currently receiving 1

## 2022-01-07 NOTE — PLAN OF CARE
Assumed care of patient this am, propofol, fent, and precedex gtt to protocol. Tolerating tube feedings. Angel catheter in place. PRN bowel medications given, patient manually disimpacted. ccAPN made aware. proned 1200 per protocol.  See EMAR for meds given

## 2022-01-08 NOTE — PLAN OF CARE
Assumed care of patient at shift change. Patient intubated and sedated. Propfol, fentanyl, precedex gtts titrated per protocol for patient comfort. Does not withdraw from painful stimuli, cough and gag present. NSR. Art line intact.  OG tube with continuous

## 2022-01-08 NOTE — RESPIRATORY THERAPY NOTE
Received patient on VC+ 24/400/80%/+8. FiO2 weaned to 60%, patient tolerated well. Patient turned to prone position at approximately noon. Minimal secretions suctioned via ETT. Breath sounds diminished. Will continue to wean as tolerated.

## 2022-01-08 NOTE — PLAN OF CARE
Upon initial assessment, pt resting in bed intubated and sedated for vent compliance, however he remains unresponsive. Pt's bp labile throughout the day, required levo briefly this am, then became hypertensive later and opened eyes spontaneously.  However,

## 2022-01-08 NOTE — DIETARY NOTE
[address] UP    Pt does not meet malnutrition criteria. NUTRITION INTERVENTION:    1. Enteral Nutrition - via NGT    Vital High Protein at goal rate of 45 ml/hr x 24 hours.     · If no IVF, recommend free water flush of 65 ml q 4 well. Will continue to monitor and follow up as appropriate. 12/6: Pt chart reviewed d/t elevated a1 levels.  Provided nutrition education on basics of carbohydrates, carbohydrate counting, recommended foods to eat, reading nutrition facts label, discussed or per MD discretion    NUTRITION DIAGNOSIS/PROBLEM:  Inadequate oral intake related to respiratory process or complication of therapy which results in mechanical ventilation as evidenced by need for NPO status    MONITOR AND EVALUATE/NUTRITION GOALS:  1.

## 2022-01-08 NOTE — PROGRESS NOTES
[facility]     Hospitalist Progress Note     Korey Cardenas Patient Status:  Inpatient    1975 MRN [id number]   [facility] 4SW-A Attending Yamil Gee MD   Hosp Day # 28 PCP PHYSICIAN NONSTAFF     Chief Complaint: Marc Holloway hours.    Inflammatory Markers  Recent Labs   Lab 01/01/22  1300 01/03/22  0501 01/03/22  1237   CRP 24.20* 17.90*  --    FLORENCIO  --  701.1*  --    LDH  --  315*  --    DDIMER  --   --  1.75*       No results for input(s): TROP, TROPHS, CK in the last 168 albina date of discharge: TBD  Discharge is dependent on: clinical progress   At this point Mr. Sridevi Go is expected to be discharge to: TBD     Fernie Veronica MD  [facility]  Internal Medicine Hospitalist  Cell [phone]

## 2022-01-08 NOTE — PROGRESS NOTES
[facility] Pulmonary, Critical Care and Sleep    Haritha Moeller Patient Status:  Inpatient    1975 MRN [id number]   [facility] 4SW-A Attending Filomena Theodore MD   [address] Day # 28 PCP PHYSICIAN NONSTAFF       Date of Admission: 12/3/2021 LORazepam, sodium chloride, glucose **OR** glucose-vitamin C **OR** dextrose **OR** glucose **OR** glucose-vitamin C, melatonin, ondansetron, prochlorperazine, polyethylene glycol (PEG 3350)       OBJECTIVE:  /75 (BP Location: Right arm)   Pulse 82 Pro-Calcitonin  Recent Labs   Lab 01/01/22  1300   PCT 1.05*       Cardiac  No results for input(s): TROP, PBNP in the last 168 hours. Creatinine Kinase  No results for input(s): CK in the last 168 hours.     Inflammatory Markers  Recent Labs   Lab with family.      Critical Care Time greater than: 35 minutes    My best regards,         Bailee Lynne MD  [facility] Pulmonary, Critical Care and Sleep Medicine

## 2022-01-09 NOTE — PROGRESS NOTES
[facility]     Hospitalist Progress Note     Finas Ast Patient Status:  Inpatient    1975 MRN [id number]   [facility] 4SW-A Attending Aleksandra Fortune MD   [facility] Day # 39 PCP PHYSICIAN NONSTAFF     Chief Complaint: Rebecca Zelaya hours.    Inflammatory Markers  Recent Labs   Lab 01/03/22  0501 01/03/22  1237   CRP 17.90*  --    FLORENCIO 701.1*  --    *  --    DDIMER  --  1.75*       No results for input(s): TROP, TROPHS, CK in the last 168 hours.     Imaging: Imaging data reviewed dependent on: clinical progress   At this point Mr. Darrin Essex is expected to be discharge to: TBD     Deepika Barr MD  [facility]  Internal Medicine Hospitalist  Cell [phone]

## 2022-01-09 NOTE — PLAN OF CARE
Assumed care of patient at shift change. Patient ventilated and sedated. Propofol, fentanyl, precedex gtts titrated for sedation and patient comfort. Patient does not withdraw from pain, cough and gag are present. NSR.  OG tube in place with continuous tube

## 2022-01-09 NOTE — PROGRESS NOTES
[facility] Pulmonary, Critical Care and Sleep    Fabian Cuellar Patient Status:  Inpatient    1975 MRN [id number]   [facility] 4SW-A Attending Tatyana Navarro MD   [facility] Day # 39 PCP PHYSICIAN NONSTAFF       Date of Admission: 12/3/2021 LORazepam **OR** LORazepam, sodium chloride, glucose **OR** glucose-vitamin C **OR** dextrose **OR** glucose **OR** glucose-vitamin C, melatonin, ondansetron, prochlorperazine, polyethylene glycol (PEG 3350)       OBJECTIVE:  /75 (BP Location: Right Detected (A) 12/03/2021       Pro-Calcitonin  No results for input(s): PCT in the last 168 hours. Cardiac  No results for input(s): TROP, PBNP in the last 168 hours. Creatinine Kinase  No results for input(s): CK in the last 168 hours.     Inflammator family.      Critical Care Time greater than: 35 minutes    My best regards,         Liliana Roland MD  [facility] Pulmonary, Critical Care and Sleep Medicine

## 2022-01-09 NOTE — PROGRESS NOTES
[facility]                INFECTIOUS DISEASE PROGRESS NOTE    Saturnino Banks Patient Status:  Inpatient    1975 MRN [id number]   [facility] 5NW-A Attending Guera Salmeron MD   Hosp Day # 39 PCP PHYSICIAN NONSTAFF     Anti 1. Blood Culture     Status: None (Preliminary result)    Collection Time: 01/06/22  2:26 PM    Specimen: Blood,peripheral   Result Value Ref Range    Blood Culture Result No Growth 2 Days N/A   2.  Urine Culture, Routine     Status: None    Collection Ti

## 2022-01-09 NOTE — PLAN OF CARE
Patient continuously monitored on telemetry. Vitals recorded every hour. Medications given per MAR. Dr. Minh Cramer updated on labile blood pressures and FiO2 requirements. ABG and Chest Xray ordered, results updated to Dr. Minh Cramer, orders completed as given.   Miri

## 2022-01-10 NOTE — PROGRESS NOTES
[facility] Dosing Service    Follow-up Pharmacokinetic Consult for Vancomycin AUC Dosing     Marc Shipman is a 42year old patient who is being treated for MRSA bacteremia and pneumonia.   Patient is on day 9 of vancomycin and is currently receiving 1

## 2022-01-10 NOTE — PROGRESS NOTES
[facility]                INFECTIOUS DISEASE PROGRESS NOTE    Esdras Brown Patient Status:  Inpatient    1975 MRN [id number]   [facility] 5NW-A Attending Bob Louise MD   Hosp Day # 40 PCP PHYSICIAN NONSTAFF     Anti Collection Time: 01/06/22  2:26 PM    Specimen: Blood,peripheral   Result Value Ref Range    Blood Culture Result No Growth 3 Days N/A   2.  Urine Culture, Routine     Status: None    Collection Time: 01/06/22  2:15 PM    Specimen: Urine, clean catch   Resu

## 2022-01-10 NOTE — PROGRESS NOTES
S: Pt remains intubated, sedated.      Meds:  • insulin detemir  35 Units Subcutaneous Nightly   • vancomycin  1,250 mg Intravenous Q8H   • artificial tears  1 Application Both Eyes TID   • LORazepam  2 mg Per NG Tube q6h   • HYDROmorphone  2 mg Ora %  S RR:  [24] 24  S VT:  [400 mL] 400 mL  PEEP/CPAP (cm H2O):  [8 cm H20] 8 cm H20  MAP (cm H2O):  [16-22] 17       Gen - intubated  Lungs - shallow respirations, relatively clear  CV - regular rate & rhythm. Normal S1, S2.  No murmurs, rubs, or gallops ap markers and lack of clinical improvement, will start to wean as I'm not sure it's helping  - baricitinib completed   - D dimer was elevated -- continue empiric full dose LMWH (12/21 start date). ID- MRSA bacteremia/pna.  MRSA from PICC. [address] removed  - o

## 2022-01-10 NOTE — PLAN OF CARE
Assumed care of patient at shift change. Intubated on the ventilator and sedated. Propofol, precedex and fentanyl gtts titrated for sedation and patient comfort. Cough and gag present. NSR on monitor. Blood pressure labile, Dr. Rut Fields aware.  OG in place with c

## 2022-01-10 NOTE — PLAN OF CARE
Patient oxygen needs decreasing, no longer requiring proning. BP still labile, but better controlled. Blood sugars still elevated in 200's.  Patient still intubated and sedated, unable to determine anxiety levels

## 2022-01-10 NOTE — PROGRESS NOTES
[facility]     Hospitalist Progress Note     Judy Ibarra Patient Status:  Inpatient    1975 MRN [id number]   [facility] 4SW-A Attending Dante Manley MD   Hosp Day # 40 PCP PHYSICIAN NONSTAFF     Chief Complaint: Corinne Cotta CK in the last 168 hours. Inflammatory Markers  Recent Labs   Lab 01/03/22  1237   DDIMER 1.75*       No results for input(s): TROP, TROPHS, CK in the last 168 hours. Imaging: Imaging data reviewed in Epic.     Medications:   • vancomycin  1,250 mg In discharge: TBD  Discharge is dependent on: clinical progress   At this point Mr. Zack Solitario is expected to be discharge to: TBD     Riki Pastor MD  [facility]  Internal Medicine Hospitalist  Cell [phone]

## 2022-01-11 NOTE — PROGRESS NOTES
S: Pt remains intubated, sedated.      Meds:  • insulin detemir  35 Units Subcutaneous Nightly   • dexamethasone Sodium Phosphate  4 mg Intravenous Daily   • vancomycin  1,250 mg Intravenous Q8H   • artificial tears  1 Application Both Eyes TID   • mL  PEEP/CPAP (cm H2O):  [8 cm H20] 8 cm H20  MAP (cm H2O):  [16-19] 19       Gen - intubated, sedated  Lungs - ctab  CV - regular rate & rhythm. Normal S1, S2. No murmurs, rubs, or gallops appreciated.   Abdomen - soft, nontender to palpation   Extremities Hyperglycemia - new DM2  -monitor accuchecks  -insulin   Anemia - suspect critical illness as etiology; no signs of bleeding  -monitor counts and transfuse PRN  Nutrition   -TFs  Proph  - Lovenox  - H2RB  Dispo  - full code  - ICU.  At risk of further dec

## 2022-01-11 NOTE — PROGRESS NOTES
[facility]     Hospitalist Progress Note     Katringiorgi Jacobson Patient Status:  Inpatient    1975 MRN [id number]   [facility] 4SW-A Attending Roel Randall MD   [address] Day # 45 PCP PHYSICIAN NONSTAFF     Chief Complaint: Vikram Alonaamer Kinase  No results for input(s): CK in the last 168 hours. Inflammatory Markers  No results for input(s): CRP, FLORENCIO, LDH, DDIMER in the last 168 hours. No results for input(s): TROP, TROPHS, CK in the last 168 hours.     Imaging: Imaging data reviewed clinical progress   At this point Mr. Sarah Taylor is expected to be discharge to: ZAINA Wetzel MD  [facility]  Internal Medicine Hospitalist  Cell [phone]

## 2022-01-12 NOTE — PROGRESS NOTES
[facility]                INFECTIOUS DISEASE PROGRESS NOTE    Chad Iqbal Patient Status:  Inpatient    1975 MRN [id number]   [facility] 5NW-A Attending Paulo Dobbs MD   Hosp Day # 44 PCP PHYSICIAN NONSTAFF     Anti Blood Culture     Status: None    Collection Time: 01/06/22  2:26 PM    Specimen: Blood,peripheral   Result Value Ref Range    Blood Culture Result No Growth 5 Days N/A   2.  Urine Culture, Routine     Status: None    Collection Time: 01/06/22  2:15 PM    S

## 2022-01-12 NOTE — PROGRESS NOTES
Pharmacy Dosing Service  Follow-up Pharmacokinetic Consult for Vancomycin Dosing          Vianney Malcolm is a 42year old male who is being treated for MRSA bacteremia/PNA. Vancomycin day #11      He has No Known Allergies.     Other current anti-

## 2022-01-12 NOTE — PLAN OF CARE
Assumed care of pt for the night shift. Pt intubated on vent, Fio2 80%, peep +8. Sedated on propofol, precedex and fentanyl.  At start of shift pt stacking breaths and coughing over vent with drop in saturations, Fio2 increased by RT and sedation increased

## 2022-01-12 NOTE — PROGRESS NOTES
Received patient sedated on precedex, propofol and fentanyl- pupils equil and reactive, does not follow commands, does not withdraw from pain, grimices. Gag and cough reflexes intact.  Intubated, FiO2 60%- increased to 70% for desaturation, weaning and curr

## 2022-01-12 NOTE — DIETARY NOTE
[address] UP    Pt does not meet malnutrition criteria. NUTRITION INTERVENTION:    1. Enteral Nutrition - via NGT    Vital High Protein at goal rate of 45 ml/hr x 24 hours.     · If no IVF, recommend free water flush of 65 ml q 4 BM since 12/7 but pt refusing laxative and stool softeners at this time. Ordered chocolate Glucerna for pt on 12/10 d/t pt complaining of poor appetite; per RN, pt tolerating well. Will continue to monitor and follow up as appropriate.   12/6: Pt chart revi ~1 ml/kcal or per MD discretion    NUTRITION DIAGNOSIS/PROBLEM:  Inadequate oral intake related to respiratory process or complication of therapy which results in mechanical ventilation as evidenced by need for NPO status    MONITOR AND EVALUATE/NUTRITION

## 2022-01-12 NOTE — PROGRESS NOTES
[facility]                INFECTIOUS DISEASE PROGRESS NOTE    Cooper Lambert Patient Status:  Inpatient    1975 MRN [id number]   [facility] 5NW-A Attending Florentino Gee MD   Hosp Day # 45 PCP PHYSICIAN NONSTAFF     Anti Encounter on 12/03/21   1. Blood Culture     Status: None    Collection Time: 01/06/22  2:26 PM    Specimen: Blood,peripheral   Result Value Ref Range    Blood Culture Result No Growth 5 Days N/A   2.  Urine Culture, Routine     Status: None    Collection T

## 2022-01-12 NOTE — PROGRESS NOTES
S: Pt remains intubated, sedated. Pt was coughing more overnight. He was started on levophed this am for hypotension.     Meds:  • insulin detemir  35 Units Subcutaneous Nightly   • dexamethasone Sodium Phosphate  4 mg Intravenous Daily   • so (%):  [65 %-100 %] 80 %  S RR:  [24] 24  S VT:  [400 mL] 400 mL  PEEP/CPAP (cm H2O):  [8 cm H20] 8 cm H20  MAP (cm H2O):  [17-20] 20       Gen - intubated, sedated  Lungs - + crackles bilaterally, shallow respirations. CV - regular rate & rhythm.  Normal S MRSA from PICC. [address] [facility] removed  - continue vanco-per ID  - completed meropenem (12/21 -12/28)   Shock - back on pressors. - wean levophed as able.    Hyperglycemia - new DM2  -monitor accuchecks  -insulin   Anemia - suspect critical illness as etiology; no

## 2022-01-12 NOTE — PROGRESS NOTES
01/12/22 0354   Vent Information   $ RT Standby Charge (per 15 min) 1   $ Vent Care / Non-Invasive Subsequent Day Yes   Ventilator Initiation 12/21/21   Ventilation Day(s) 22   Interface Invasive   Vent Type    Vent ID [id number]   Vent Mode VC+   Settings

## 2022-01-13 NOTE — PROGRESS NOTES
S: Pt remains intubated, sedated. Pt is still on levophed for hypotension. Diaphoretic.     Meds:  • vancomycin  2,000 mg Intravenous Q12H   • midodrine  10 mg Oral TID   • insulin detemir  35 Units Subcutaneous Nightly   • dexamethasone Sodium Phos 24  S VT:  [400 mL] 400 mL  PEEP/CPAP (cm H2O):  [8 cm H20] 8 cm H20  MAP (cm H2O):  [16-20] 17       Gen - intubated, sedated. Diaphoretic. Lungs - + crackles bilaterally, shallow respirations.   CV - tachycardic, no murmurs  Abdomen - soft, nontender to tomorrow.  - baricitinib completed   - D dimer was elevated -- continue empiric full dose LMWH (12/21 start date). ID- MRSA bacteremia/pna.  Grew MRSA from PICC which was subsequently removed  - continue vanco-per ID  - completed meropenem (12/21 -12/28)

## 2022-01-13 NOTE — PLAN OF CARE
Assumed care of patient at shift change. Patient intubated on the ventilator and sedated on propofol, precedex, and fentanyl. Around 2200 patient's SBP spike into 200's, labored breathing, ST on monitor.  Levo turned off and gtts titrated for patient comfor

## 2022-01-13 NOTE — PROGRESS NOTES
Received pt on full support. Nebs given as scheduled. BS are diminished. Scant secretions when suctioning. Will continue to monitor.         01/13/22 0311   Vent Information   $ RT Standby Charge (per 15 min) 1   Ventilator Initiation 12/21/22   Ventilation

## 2022-01-13 NOTE — PROGRESS NOTES
[facility]                INFECTIOUS DISEASE PROGRESS NOTE    Gerson Garcia Patient Status:  Inpatient    1975 MRN [id number]   [facility] 5NW-A Attending Doni Herrmann MD   Hosp Day # 36 PCP PHYSICIAN NONSTAFF     Anti Time: 01/06/22  2:26 PM    Specimen: Blood,peripheral   Result Value Ref Range    Blood Culture Result No Growth 5 Days N/A   2.  Urine Culture, Routine     Status: None    Collection Time: 01/06/22  2:15 PM    Specimen: Urine, clean catch   Result Value Re

## 2022-01-13 NOTE — PLAN OF CARE
Received patient vented and sedated on propofol/precedex/fentynal. Vent settings FiO2 80%, peep +8, able to wean to 65%. Blood pressure persently hypotensive, restart levo, titrating to maintain SBP >90.  APN aware of increased pressors- order for echo comp

## 2022-01-14 NOTE — PROGRESS NOTES
Lucia York Patient Status:  Inpatient    1975 MRN [id number]   [facility] 4SW-A Attending Danette Del Toro MD   Hosp Day # 39 PCP PHYSICIAN NONSTAFF     Critical Care Progress Note      Assessment/Plan:  1.  Acute respiratory failure the same. Agitated at times. Needing a little pressors.   Still on 60% fio2    Objective:    Medications:  • dexamethasone Sodium Phosphate  2 mg Intravenous Daily   • vancomycin  2,000 mg Intravenous Q12H   • midodrine  10 mg Oral TID   • insulin detemir WBC 9.5   .0     Recent Labs   Lab 01/12/22  0440 01/13/22  0441 01/14/22  0405   * 190* 145*   BUN 18 18 15   CREATSERUM 0.38* 0.37* 0.30*   GFRAA 171 172 188   GFRNAA 148 149 163   CA 8.7 8.5 8.6   ALB 2.2* 2.0* 2.0*    138 137   K

## 2022-01-14 NOTE — PLAN OF CARE
Assumed care of patient at shift change. Patient intubated and sedated. Precedex, propofol, fenatnyl gtts infusing. Patient does not withdraw from painful stimuli. Presents with strong cough and gag.  Patient frequently coughing and fighting ventilator - gt

## 2022-01-14 NOTE — PLAN OF CARE
Assumed care of pt after shift report. Received pt orally intubated, precedex/propfol/fentanyl gtts infusing. Received on FiO2 65%, weaned to 50% per RT. Titrated for ventilator compliance. Strong cough. No movement to painful stimulus x4 extremities.  Afeb

## 2022-01-14 NOTE — PROGRESS NOTES
[facility]                INFECTIOUS DISEASE PROGRESS NOTE    Esdras Brown Patient Status:  Inpatient    1975 MRN [id number]   [facility] 5NW-A Attending Bob Louise MD   Hosp Day # 39 PCP PHYSICIAN NONSTAFF     Anti Collection Time: 01/06/22  2:26 PM    Specimen: Blood,peripheral   Result Value Ref Range    Blood Culture Result No Growth 5 Days N/A   2.  Urine Culture, Routine     Status: None    Collection Time: 01/06/22  2:15 PM    Specimen: Urine, clean catch   Resu

## 2022-01-14 NOTE — CM/SW NOTE
CM updated LTAC referrals in aidin with clinicals and MAR. Awaiting clinical improvement to get trach/Peg.  &  to remain available and supportive for discharge planning needs.     Patel Esparza, RN Case Manager [phone]

## 2022-01-15 NOTE — PROGRESS NOTES
[facility]                INFECTIOUS DISEASE PROGRESS NOTE    Judy Ibarra Patient Status:  Inpatient    1975 MRN [id number]   [facility] 5NW-A Attending Sapna Fischer MD   Hosp Day # 43 PCP PHYSICIAN NONSTAFF     Anti Growth 5 Days N/A   2. Urine Culture, Routine     Status: None    Collection Time: 01/06/22  2:15 PM    Specimen: Urine, clean catch   Result Value Ref Range    Urine Culture No Growth at 18-24 hrs.  N/A         Radiology    CTA 12/3    Problem list reviewe

## 2022-01-15 NOTE — PLAN OF CARE
Assumed care after RN report; pt sedated on vent. Propfol/fent/precedex; titrated. No response to painful stimuli. Diaphoretic cont. VSS. Fent bolus x1 for cont coughing fit. Art line intact. Angel patent. OGT w/ TF per order; tolerating. See MAR.  See Flow

## 2022-01-15 NOTE — PROGRESS NOTES
Judith Ward Patient Status:  Inpatient    1975 MRN [id number]   [facility] 4SW-A Attending Sanjana Garcia MD   Hosp Day # 43 PCP PHYSICIAN NONSTAFF     Critical Care Progress Note      Assessment/Plan:  1.  Acute respiratory failure very prolonged timeline of continued supportive care. She is going to reach out to social work on Monday to start preparations           Critical Care Time greater than: 35 minutes  Butch Sharp M.D.   Pulmonary and Critical Care Medicine  [facility] No tenderness or deformity. Heart: Regular rate and rhythm, normal S1S2, no murmur. Abdomen: soft, non-tender, non-distended, positive BS. Extremity: No clubbing or cyanosis no edema. Skin: No rashes or lesions.     Recent Labs   Lab 01/15/22  0501

## 2022-01-15 NOTE — PLAN OF CARE
Assumed care of patient at change of shift. Pt orally intubated and sedated weaning sedation as tolerated to goal rass -4. Scheduled dilaudid adjusted and fentanyl patch started. No response to pain, pupils equal and reactive. BSWR for safety.  + strong cou

## 2022-01-15 NOTE — PROGRESS NOTES
01/14/22 1555   Vent Information   Ventilator Initiation 12/21/22   Ventilation Day(s) 24   Interface Invasive   Vent Type    Vent ID 21   Vent Mode VC+   Settings   FiO2 (%) 70 %   Resp Rate (Set) 24   Vt (Set, mL) 400 mL   Waveform Decelerating r

## 2022-01-15 NOTE — PROGRESS NOTES
Received pt vented, VC+ 24/400/70%/+8, tolerating well. Duoneb Q6. Will continue to monitor closely.       01/15/22 0343   Vent Information   $ RT Standby Charge (per 15 min) 1   $ Vent Care / Non-Invasive Subsequent Day Yes   Ventilator Initiation 12/21/21

## 2022-01-15 NOTE — PROGRESS NOTES
Interventional Pulmonology    O2 needs improving. Now on 70% FIO2 and PEEP 8. No longer proning. Will continue to follow w [facility] pulm for stability to proceed w trach and PEG.     Monico Rangel MD, [facility]

## 2022-01-16 NOTE — PROGRESS NOTES
Tigist Tristan Patient Status:  Inpatient    1975 MRN [id number]   [facility] 4SW-A Attending Felipe Orr MD   Hosp Day # 37 PCP PHYSICIAN NONSTAFF     Critical Care Progress Note      Assessment/Plan:  1.  Acute respiratory failure on Monday to start preparations        Critical Care Time greater than: 35 minutes    Breana Fairchild M.D. Pulmonary and Critical Care Medicine  [facility]     Subjective:  No change. Remains minimally responsive.   Non purposeful opening of eyes soft, non-tender, non-distended, positive BS. Extremity: No clubbing or cyanosis no edema. Skin: No rashes or lesions.     Recent Labs   Lab 01/16/22  0533   RBC 3.46*   HGB 10.0*   HCT 31.0*   MCV 89.6   MCH 28.9   MCHC 32.3   RDW 13.8   NEPRELIM 4.78

## 2022-01-16 NOTE — PLAN OF CARE
Assumed care after RN report; pt sedated on vent. Propfol/fent/precedex; titrated. No response to painful stimuli. Diaphoretic; febrile - tylenol given. Fent bolus x1 for coughing fit; versed for cont coughing.  Art line intact; hypotensive; Levo r/s; titr

## 2022-01-16 NOTE — PLAN OF CARE
Pt with episodes of HTN (sbp >200) and tachycardia again this am, prn versed, propofol increased, precedex increased. Plan for CT head. No change in neuro, pupils equal and reactive brisk. Strong cough and gag. Low grade temp, pt flushed.

## 2022-01-16 NOTE — PROGRESS NOTES
[facility]     Hospitalist Progress Note     Judy Ibarra Patient Status:  Inpatient    1975 MRN [id number]   [facility] 4SW-A Attending Dante Manley MD   Hosp Day # 37 PCP PHYSICIAN NONSTAFF     Chief Complaint: sob fever    S results for input(s): CK in the last 168 hours. Inflammatory Markers  No results for input(s): CRP, FLORENCIO, LDH, DDIMER in the last 168 hours. No results for input(s): TROP, TROPHS, CK in the last 168 hours. Imaging: Imaging data reviewed in Epic. critical illness as etiology; no signs of bleeding  -get CT brain today.   neuro eval  -continue TFs      Plan of care: as above    Plan of care discussed with RN    Jessa Mane MD    Supplementary Documentation:     Quality:  · DVT Prophylaxis: Lovenox  ·

## 2022-01-16 NOTE — PLAN OF CARE
Assumed care of patient at change of shift. Pt orally intubated and sedated weaning sedation as tolerated -4. On propofol, precedex and fentanyl. Prn fentanyl ivp x3 for CPOT. No response to pain, pupils equal and reactive.  Eyes open not to stimulation, no

## 2022-01-17 PROBLEM — U07.1 ENCEPHALOPATHY DUE TO COVID-19 VIRUS: Status: ACTIVE | Noted: 2022-01-01

## 2022-01-17 PROBLEM — G93.49 ENCEPHALOPATHY DUE TO COVID-19 VIRUS: Status: ACTIVE | Noted: 2022-01-01

## 2022-01-17 NOTE — PLAN OF CARE
Problem: Patient/Family Goals  Goal: Patient/Family Long Term Goal  Description: Patient's Long Term Goal: successful extubation     Interventions:  - wean fi02 as able   - prone   - See additional Care Plan goals for specific interventions  Outcome: Not wakefulness i.e. lights on, blinds open  - Promote sleep, encourage patient's normal rest cycle i.e. lights off, TV off, minimize noise and interruptions  - Encourage family to assist in orientation and promotion of home routines  Outcome: Not Progressing

## 2022-01-17 NOTE — PROGRESS NOTES
Pietro Patient Status:  Inpatient    1975 MRN [id number]   [facility] 4SW-A Attending Yamil Gee MD   Hosp Day # 40 PCP PHYSICIAN NONSTAFF     Pulm / Critical Care Progress Note     S: remains intubated.  Rona Cartagena melatonin, ondansetron, prochlorperazine, polyethylene glycol (PEG 3350)       OBJECTIVE:   01/17/22  0400 01/17/22  0500 01/17/22  0600 01/17/22  0700   BP:       Pulse: 97 109 92 82   Resp: (!) 34 24 21 22   Temp: 98.6 °F (37 °C)      TempSrc: Temporal now at 75% and PEEP 8  - continue gabapentin to help suppress cough  - adjusted vent to PC due to significantly elevated pressures.   Check cxr.     - needs trach/PEG once FiO2/PEEP are more reasonable; Dr. Yo Done.    - antibiotics/COVID therapies as

## 2022-01-17 NOTE — PROGRESS NOTES
01/17/22 0338   Vent Information   $ RT Standby Charge (per 15 min) 1   $ Vent Care / Non-Invasive Subsequent Day Yes   Ventilator Initiation 12/21/22   Ventilation Day(s) 27   Interface Invasive   Vent Type    Vent ID [id number]   Vent Mode VC+   Settin

## 2022-01-17 NOTE — RESPIRATORY THERAPY NOTE
Received patient on full support, VC+ 24/400/75%/+8. Patient was noted to have elevated peak pressures >40, MD notified. Vent settings changed per Dr. Usman Mason to PC/AC R24/P27/75%/+8. Patient tolerating well. Continue nebs as ordered.   Patient does ha

## 2022-01-17 NOTE — PROGRESS NOTES
[facility]     Hospitalist Progress Note     Alonaarnold Banks Patient Status:  Inpatient    1975 MRN [id number]   [facility] 4SW-A Attending Tuan Veliz MD   Hosp Day # 37 PCP PHYSICIAN NONSTAFF     Chief Complaint: sob    Subject results for input(s): TROP, PBNP in the last 168 hours. Creatinine Kinase  No results for input(s): CK in the last 168 hours. Inflammatory Markers  No results for input(s): CRP, FLORENCIO, LDH, DDIMER in the last 168 hours.     No results for input(s): TROP meropenem  -Hyperglycemia - new DM2  -monitor accuchecks  -insulin   -Anemia - suspect critical illness as etiology; no signs of bleeding  -get CT brain today.  neuro eval  -continue TFs        Plan of care: as above     Plan of care discussed with RN

## 2022-01-17 NOTE — PROCEDURES
ELECTROENCEPHALOGRAM REPORT      Patient Name: Tenisha Reece  Chart ID: [id number]  Ordering Physician: Dr Heron Lui  Date of Test: 1/17/2022  Referring Physician:   Patient Diagnosis: Encephalopathy       HISTORY  42year old with Covid-19 pneumonia and

## 2022-01-17 NOTE — PLAN OF CARE
Pt remains on 70% fiow +8 peep throughout the day. Has coughing fits throughout the day. Moderate thick tan yellow secretions inline. Titrated precedex/fentanyl/ propofol to goal rass -3.  No change in neuro status throughout the day, CT head done. eeg rao

## 2022-01-17 NOTE — PLAN OF CARE
Received pt following AM report. Pt intubated and sedated. Pt requiring pressor support-titrate as able. TF running and tolerating. EEG done. Neuro consulted. Titrating sedation as able. Continue to monitor.

## 2022-01-17 NOTE — DIETARY NOTE
[address]    Pt does not meet malnutrition criteria. NUTRITION INTERVENTION:    1. Enteral Nutrition - via NGT    Vital High Protein at goal rate of 45 ml/hr x 24 hours.     · If no IVF, recommend free water flush of 65 ml q 4 to safely discharge. Will continue to monitor and follow up as appropriate. 12/13: Attempted to reach pt via bedside telephone with no answer. Per chart, PO intake has been good, averaging 100%.  Noted no BM since 12/7 but pt refusing laxative and stool so [facility] State: MV:10.3 L/min, Temp:36.7C)  [5376-6048 calorie/day (25-27 kcal/kg)]  Protein:  grams protein/day (1.2-1.5 gm/kg)  Fluid: ~1 ml/kcal or per MD discretion    NUTRITION DIAGNOSIS/PROBLEM:  Inadequate oral intake related to respiratory proce

## 2022-01-17 NOTE — PLAN OF CARE
Patient intubated and sedated. Propofol, fenatnyl and precedex. Patient is not responsive to painful stimuli. Has a cough and gag. Very diaphoretic. Frequently has coughing fits. Fentanyl boluses used PRN, see MAR. Afebrile, bp very labile.  Levo restarted

## 2022-01-17 NOTE — CONSULTS
[facility]    Report of Consultation    Flex Anaya Patient Status:  Inpatient    1975 MRN [id number]   [facility] 4SW-A Attending Sarah Dowd MD   [facility] Day # 40 PCP PHYSICIAN NONSTAFF     Date of Admission:  12/3/2021  Hyp pertinent family history. reports that he has quit smoking. He has never used smokeless tobacco. He reports previous alcohol use. He reports previous drug use.     Allergies:  No Known Allergies    Medications:    Current Facility-Administered Medications mg, 650 mg, Rectal, Q4H PRN **OR** acetaminophen (OFIRMEV) infusion 1,000 mg, 1,000 mg, Intravenous, Q6H PRN  •  bisacodyl (DULCOLAX) rectal suppository 10 mg, 10 mg, Rectal, Daily PRN  •  Chlorhexidine Gluconate (PERIDEX) 0.12 % solution 15 mL, 15 mL, Martha chewable tab 8 tablet, 8 tablet, Oral, Q15 Min PRN  •  melatonin tab 3 mg, 3 mg, Oral, Nightly PRN  •  ondansetron (ZOFRAN) injection 4 mg, 4 mg, Intravenous, Q6H PRN  •  prochlorperazine (COMPAZINE) injection 5 mg, 5 mg, Intravenous, Q8H PRN  •  polyethyl

## 2022-01-18 NOTE — PROGRESS NOTES
Pietro Patient Status:  Inpatient    1975 MRN [id number]   [facility] 4SW-A Attending Danette Del Toro MD   Hosp Day # 39 PCP PHYSICIAN NONSTAFF     Pulm / Critical Care Progress Note     S: pt remains intubated Pulse: 109 103 92 88   Resp: (!) 27 (!) 30 (!) 29 26   Temp: 99.2 °F (37.3 °C)      TempSrc: Temporal      SpO2: 90% 90% 92% 92%   Weight:       Height:            Ventilator Settings: PC RR 28  Pi 27 Ti 0.85s Fios 70% peep 8   Vt 350cc      Wt Readings / Lani Fraga.    - antibiotics/COVID therapies as below  - am abg pending  2. COVID-19 PNA - symptom onset 11/25; diagnosed 12/3. Unvaccinated.    - completed course of dexamethasone and remdesivir  - decadron restarted 12/24 due to elevated inflammato

## 2022-01-18 NOTE — PLAN OF CARE
Patient intubated and sedated. Propofol, fentanyl and precedex for sedation. Patient not responsive to painful stimuli, has a cough and gag. Diaphoretic. Frequently coughs with any movments. PRN boluses used as needed, see MAR. Afebrile.  BP labile, levo on

## 2022-01-18 NOTE — PLAN OF CARE
Received pt this AM. Intubated and sedated. Continues to have coughing fits and requiring sedation to keep clm and help with coughing fits. BS remains elevated hospitalist notified see MAR. Blood pressure labile, levo on/off see MAR.  Will continue to monit

## 2022-01-18 NOTE — PROGRESS NOTES
01/18/22 0535   Vent Information   $ RT Standby Charge (per 15 min) 1   Ventilator Initiation 12/22/22   Ventilation Day(s) 28   Interface Invasive   Vent Type    Vent ID [id number]   Vent Mode PC/AC   Settings   FiO2 (%) 70 %   Resp Rate (Set) 28   Wavefo

## 2022-01-18 NOTE — PROGRESS NOTES
[id number] Shannan Cespedes Neurology Progress Note    Juliann Jacobson Patient Status:  Inpatient    1975 MRN [id number]   [facility] 4SW-A Attending Roel Randall MD   Hosp Day # 39 PCP PHYSICIAN NONSTAFF     CC: YING    Subjective:  Estrella Min PRN  acetaminophen (TYLENOL) tab 650 mg, 650 mg, Oral, Q4H PRN   Or  acetaminophen (TYLENOL) 160 MG/5ML oral liquid 650 mg, 650 mg, Oral, Q4H PRN   Or  acetaminophen (Tylenol) rectal suppository 650 mg, 650 mg, Rectal, Q4H PRN   Or  acetaminophen (MARGARET injection 50 mL, 50 mL, Intravenous, Q15 Min PRN   Or  glucose (DEX4) oral liquid 30 g, 30 g, Oral, Q15 Min PRN   Or  glucose-vitamin C (DEX-4) chewable tab 8 tablet, 8 tablet, Oral, Q15 Min PRN  melatonin tab 3 mg, 3 mg, Oral, Nightly PRN  ondansetron (ZO failure  Hyperglycemia   Shock   Encephalopathy, likely multifactorial due to sedation, Covid, medications.     Plan:  Wean off sedation as tolerated  Will reassess once completely off sedation  Consider MRI brain if no improvement, would not tolerate today

## 2022-01-18 NOTE — PROGRESS NOTES
[facility]     Hospitalist Progress Note     Chad Iqbal Patient Status:  Inpatient    1975 MRN [id number]   [facility] 4SW-A Attending Shekhar Ocasio MD   Hosp Day # 40 PCP PHYSICIAN NONSTAFF     Chief Complaint: sob    Subject results for input(s): CK in the last 168 hours. Inflammatory Markers  No results for input(s): CRP, FLORENCIO, LDH, DDIMER in the last 168 hours. No results for input(s): TROP, TROPHS, CK in the last 168 hours. Imaging: Imaging data reviewed in Epic. brain today.  neuro eval  -continue TFs        Plan of care: as above     Plan of care discussed Cooper Campos

## 2022-01-18 NOTE — PROGRESS NOTES
[facility]                INFECTIOUS DISEASE PROGRESS NOTE    Gerson Garcia Patient Status:  Inpatient    1975 MRN [id number]   [facility] 5NW-A Attending Doni Herrmann MD   Hosp Day # 39 PCP PHYSICIAN NONSTAFF     Anti 10:49 AM    Specimen: Bld,Arterial Line; Blood   Result Value Ref Range    Blood Culture Result No Growth 3 Days N/A   2.  Urine Culture, Routine     Status: None    Collection Time: 01/06/22  2:15 PM    Specimen: Urine, clean catch   Result Value Ref Range

## 2022-01-18 NOTE — PROGRESS NOTES
[facility]                INFECTIOUS DISEASE PROGRESS NOTE    Saturnino Banks Patient Status:  Inpatient    1975 MRN [id number]   [facility] 5NW-A Attending Guera Salmeron MD   Hosp Day # 40 PCP PHYSICIAN NONSTAFF     Anti Result Value Ref Range    Blood Culture Result No Growth 2 Days N/A   2. Urine Culture, Routine     Status: None    Collection Time: 01/06/22  2:15 PM    Specimen: Urine, clean catch   Result Value Ref Range    Urine Culture No Growth at 18-24 hrs.  N/A

## 2022-01-19 NOTE — PROGRESS NOTES
[facility]     Hospitalist Progress Note     Rodríguez Hernandez Patient Status:  Inpatient    1975 MRN [id number]   [facility] 4SW-A Attending Gordon Gilford, MD   Hosp Day # 39 PCP PHYSICIAN NONSTAFF     Chief Complaint: sob    Subject hours.    Creatinine Kinase  No results for input(s): CK in the last 168 hours. Inflammatory Markers  No results for input(s): CRP, FLORENCIO, LDH, DDIMER in the last 168 hours. No results for input(s): TROP, TROPHS, CK in the last 168 hours.     Imaging: I etiology; no signs of bleeding  -get CT brain today.  neuro eval  -continue TFs        Plan of care: as above     Plan of care discussed with RN

## 2022-01-19 NOTE — PROGRESS NOTES
[facility]                INFECTIOUS DISEASE PROGRESS NOTE    Whitney Osei Patient Status:  Inpatient    1975 MRN [id number]   [facility] 5NW-A Attending Corinne Early, MD   Hosp Day # 55 PCP PHYSICIAN NONSTAFF     Anti AM    Specimen: Bld,Arterial Line; Blood   Result Value Ref Range    Blood Culture Result No Growth 3 Days N/A   2.  Urine Culture, Routine     Status: None    Collection Time: 01/06/22  2:15 PM    Specimen: Urine, clean catch   Result Value Ref Range    Ur

## 2022-01-19 NOTE — PLAN OF CARE
Assumed care 1930. Mechanically ventilated and sedated on propofol/precedex/fentanyl. Opens eyes spontaneously on occasion, no tracking/eye contact. No purposeful movement, does not follow commands, +cough/+gag.   Large amount ET and oral white, tan secr

## 2022-01-19 NOTE — PROGRESS NOTES
[id number] Shannan Cespedes Neurology Progress Note    Juliann Jacobson Patient Status:  Inpatient    1975 MRN [id number]   [facility] 4SW-A Attending Theo Chávez MD   Hosp Day # 55 PCP PHYSICIAN NONSTAFF     CC: AMS    Subjective:  Brian injection 50 mcg, 50 mcg, Intravenous, Q30 Min PRN  acetaminophen (TYLENOL) tab 650 mg, 650 mg, Oral, Q4H PRN   Or  acetaminophen (TYLENOL) 160 MG/5ML oral liquid 650 mg, 650 mg, Oral, Q4H PRN   Or  acetaminophen (Tylenol) rectal suppository 650 mg, 650 mg Min PRN   Or  glucose (DEX4) oral liquid 30 g, 30 g, Oral, Q15 Min PRN   Or  glucose-vitamin C (DEX-4) chewable tab 8 tablet, 8 tablet, Oral, Q15 Min PRN  melatonin tab 3 mg, 3 mg, Oral, Nightly PRN  ondansetron (ZOFRAN) injection 4 mg, 4 mg, Intravenous, Shock   Encephalopathy, likely multifactorial due to sedation, Covid, medications.     Plan:  Wean off sedation as tolerated  Will reassess once completely off sedation  Consider MRI brain if no improvement, would not tolerate today per RN.   Robyn rushing

## 2022-01-19 NOTE — PROGRESS NOTES
Pietro Patient Status:  Inpatient    1975 MRN [id number]   [facility] 4SW-A Attending Dash Rodríguez MD   Hosp Day # 55 PCP PHYSICIAN NONSTAFF     Pulm / Critical Care Progress Note     S: remains intubated. BP:       Pulse: 107  90 88   Resp: (!) 27  (!) 28 (!) 28   Temp:    97.1 °F (36.2 °C)   TempSrc:    Temporal   SpO2: 93%  100% 92%   Weight:  156 lb 1.4 oz (70.8 kg)     Height:            Ventilator Settings: PC 28 Pi 27 Pi 0.85s FIo2 60% pEEP 8   Vt 3 Junie chilel. Updated them on status. - antibiotics/COVID therapies as below  - am abg pending  2. COVID-19 PNA - symptom onset 11/25; diagnosed 12/3. Unvaccinated.    - completed course of dexamethasone and remdesivir  - decadron restarted 12/24 due t Heron Lui M.D.   [facility]  Pulmonary / Critical care  1/19/2022  8:44 AM

## 2022-01-19 NOTE — PROGRESS NOTES
[facility]     Hospitalist Progress Note     Cooper Lambert Patient Status:  Inpatient    1975 MRN [id number]   [facility] 4SW-A Attending Jayy Marie MD   Hosp Day # 55 PCP PHYSICIAN NONSTAFF     Chief Complaint: sob    Subject results for input(s): CK in the last 168 hours. Inflammatory Markers  No results for input(s): CRP, FLORENCIO, LDH, DDIMER in the last 168 hours. No results for input(s): TROP, TROPHS, CK in the last 168 hours. Imaging: Imaging data reviewed in Epic. care discussed Jessica Villarreal MD  1/19/2022

## 2022-01-20 NOTE — PLAN OF CARE
Assumed care of patient at 1930 vented and sedated on Propofol, Precedex, and Fentanyl drips. Spontaneously opens eyes slightly, no tracking or eye contact. Does not follow commands, no movement to painful stimuli or spontaneous movements. +cough/+gag.  Sti

## 2022-01-20 NOTE — PROGRESS NOTES
Pietro Patient Status:  Inpatient    1975 MRN [id number]   [facility] 4SW-A Attending Shaheen Brunner MD   Hosp Day # 52 PCP PHYSICIAN NONSTAFF     Pulm / Critical Care Progress Note     S: pt remains intubat 01/20/22  0400 01/20/22  0420 01/20/22  0500 01/20/22  0600   BP:       Pulse: 95 98 97 96   Resp: (!) 29 (!) 28 26 (!) 66   Temp: 98.1 °F (36.7 °C)      TempSrc: Temporal      SpO2: 90% 99% 90% (!) 88%   Weight:       Height:            Ventilator Setting Increase fentanyl to 75mcg/hr in attempt to reduce. May want to consider switch to versed. - adjusted vent to PC 1/17 due to significantly elevated pressures. - needs trach/PEG once FiO2/PEEP are more reasonable; Dr. Behzad Huntley.  current until then with hopes we can eventually perform trach / peg and consider rehab placement.  If we have not been able to accomplish this by the time she comes in she may change goals of care.    - hopeful we can accomplish trach soon      Critical Care Time g

## 2022-01-20 NOTE — PLAN OF CARE
Problem: Patient/Family Goals  Goal: Patient/Family Long Term Goal  Description: Patient's Long Term Goal: successful extubation     Interventions:  - wean fi02 as able   - prone   - See additional Care Plan goals for specific interventions  Outcome: Not

## 2022-01-20 NOTE — PROGRESS NOTES
01/20/22 0420   Vent Information   $ RT Standby Charge (per 15 min) 1   $ Vent Care / Non-Invasive Subsequent Day Yes   Ventilator Initiation 12/22/21   Ventilation Day(s) 30   Interface Invasive   Vent Type    Vent ID [id number]   Vent Mode PC/AC   Sett

## 2022-01-20 NOTE — PROGRESS NOTES
Interventional Pulmonology Progress Note     SUBJECTIVE/Interval history:  No acute events overnight, remains on PCAC, intubated/sedated.  No fevers    OBJECTIVE:   01/20/22  0500 01/20/22  0600 01/20/22  0605 01/20/22  0700   BP: 14.4   NEPRELIM 4.56 4.18 5.04   WBC 8.9 7.4 8.4   .0 292.0 320.0     No results for input(s): BNP in the last 168 hours. No results for input(s): TROP, CK in the last 168 hours.   Recent Labs   Lab 01/18/22  0423 01/20/22  0435   INR 1.19 1.10 gabapentin  300 mg Per NG Tube TID   • senna  5 mL Oral BID   • docusate  100 mg Oral BID   • chlorhexidine gluconate  15 mL Mouth/Throat [email or web address]   • famoTIDine  20 mg Intravenous BID   • Insulin Aspart Pen  1-10 Units Subcutaneous 4 times per day

## 2022-01-20 NOTE — PROGRESS NOTES
[facility]     Hospitalist Progress Note     [facility] Patient Status:  Inpatient    1975 MRN [id number]   [facility] 4SW-A Attending Augusta Ordoñez MD   Hosp Day # 52 PCP PHYSICIAN NONSTAFF     Chief Complaint: sob    Subject hours.    Creatinine Kinase  No results for input(s): CK in the last 168 hours. Inflammatory Markers  No results for input(s): CRP, FLORENCIO, LDH, DDIMER in the last 168 hours. No results for input(s): TROP, TROPHS, CK in the last 168 hours.     Imaging: I reviewed  Wean pressors as able          Plan of care: as above     Plan of care discussed Anabel Orr MD  1/20/2022

## 2022-01-20 NOTE — PROGRESS NOTES
01/19/22 8211   Vent Information   $ RT Standby Charge (per 15 min) 1   Ventilator Initiation 12/22/21   Ventilation Day(s) 29   Interface Invasive   Vent Type    Vent ID 21   Vent Mode PC/AC   Settings   FiO2 (%) 60 %   Resp Rate (Set) 28   Wavefo

## 2022-01-21 NOTE — PROGRESS NOTES
[facility]                INFECTIOUS DISEASE PROGRESS NOTE    Charity Negron Patient Status:  Inpatient    1975 MRN [id number]   [facility] 5NW-A Attending Brittany Reeves MD   Hosp Day # 50 PCP PHYSICIAN NONSTAFF     Anti 01/15/22 10:49 AM    Specimen: Bld,Arterial Line; Blood   Result Value Ref Range    Blood Culture Result No Growth 5 Days N/A   2.  Urine Culture, Routine     Status: None    Collection Time: 01/06/22  2:15 PM    Specimen: Urine, clean catch   Result Value

## 2022-01-21 NOTE — PLAN OF CARE
Problem: RESPIRATORY - ADULT  Goal: Achieves optimal ventilation and oxygenation  Description: INTERVENTIONS:  - Assess for changes in respiratory status  - Assess for changes in mentation and behavior  - Position to facilitate oxygenation and minimize r medications as ordered  - Initiate emergency measures for life threatening arrhythmias  - Monitor electrolytes and administer replacement therapy as ordered  Outcome: Progressing     Problem: GASTROINTESTINAL - ADULT  Goal: Maintains or returns to baseline sites and surrounding tissue  - Implement wound care per orders  - Initiate isolation precautions as appropriate  - Initiate Pressure Ulcer prevention bundle as indicated  Outcome: Progressing

## 2022-01-21 NOTE — PROGRESS NOTES
[facility]                INFECTIOUS DISEASE PROGRESS NOTE    Delon Briseno Patient Status:  Inpatient    1975 MRN [id number]   [facility] 5NW-A Attending Caitlin De La Fuente MD   Hosp Day # 52 PCP PHYSICIAN NONSTAFF     Anti Copy:    Chief Complaint   Patient presents with   • Diabetes   • Convey Results       Preferred method of results communication:   Cell Phone:   Telephone Information:   Mobile [phone]     Okay to leave a message containing results? Yes    HISTORY OF PRESENT  ILLNESS  Guilherme Mckay is a 50 year old male, presents today to review his recent A1 c which increased from 7.3-7.9%.  It is obvious why this is because patient has not been very compliant with Ozempic since it causes nausea.  He has been compliant with 14 units of Tresiba nightly but titrates his mealtime Humalog depending on the sugar before his meals.  Currently the patient is not taking any Humalog with his breakfast because he states that he tends to try to keep his breakfast as a full protein breakfast.  But his the postprandial sugar numbers are between 150 and 160. He averages around 10 units for lunch in around 12 units for dinner depending on what his sugar numbers are.  Activity level has definitely decreased since he has been working out of his house for the last year during the COVID pandemic.  Next    Past Medical History  Past Medical History:   Diagnosis Date   • Diabetes mellitus (CMS/[facility])    • Essential (primary) hypertension    • Familial hypercholesterolemia 3/22/2018   • Hemorrhoids     Internal hemorrhoids status post banding November of 2012   • History of chicken pox    • Other and unspecified hyperlipidemia    • Tubular adenoma     11/2012   • Type 2 diabetes mellitus without complication, without long-term current use of insulin (CMS/[facility]) 3/22/2018     Medications  Current Outpatient Medications   Medication Sig   • lisinopril (ZESTRIL) 5 MG tablet TAKE 1 TABLET BY MOUTH EVERY DAY   • pravastatin (PRAVACHOL) 40 MG tablet TAKE 1 TABLET BY MOUTH EVERY DAY AT NIGHT   • Tresiba FlexTouch 200 UNIT/ML pen-injector INJECT 10 UNITS NIGHTLY TITRATE ACCORDING TO SCALE. NOT TO EXCEED 20 UNITS NIGHTLY   • metformin (GLUCOPHAGE) 1000 MG  Ref Range    Blood Culture Result No Growth 5 Days N/A   2. Urine Culture, Routine     Status: None    Collection Time: 01/06/22  2:15 PM    Specimen: Urine, clean catch   Result Value Ref Range    Urine Culture No Growth at 18-24 hrs.  N/A         Radiolog tablet TAKE 2 TABLETS BY MOUTH AT BEDTIME   • Continuous Blood Gluc Sensor (FreeStyle Vasiliy 14 Day Sensor) Misc CHANGE OUT EVERY 14 DAYS   • HUMALOG KWIKPEN 100 UNIT/ML pen-injector INJECT 14 TO 18 UNITS SUBCUTANEOUSLY AT DINNER - PRIME 2 UNITS FIRST BEFORE EACH ADMINISTRATION.   • Semaglutide, 1 MG/DOSE, (OZEMPIC, 1 MG/DOSE,) (1.34 mg/ml) 1 MG/DOSE Solution Pen-injector Inject 1 mg into the skin every 7 days.   • blood glucose (PRECISION XTRA GLUCOSE) test strip TESTS TWICE DAILY   • blood glucose (PRECISION XTRA GLUCOSE) test strip TESTS TWICE DAILY   • Lancets 30G Misc Use twice daily. Dx: 250.00   • ibuprofen (MOTRIN) 200 MG tablet Take 200 mg by mouth every 6 hours as needed for Pain.   • triamcinolone (ARISTOCORT) 0.1 % cream Apply to affected are twice daily.   • aspirin 81 MG tablet Take 81 mg by mouth daily.   • Multiple Vitamins-Minerals (MULTIVITAMIN PO) Take  by mouth.   • Cyanocobalamin (VITAMIN B-12) 6000 MCG SUBL Place 30 mcg under the tongue.       No current facility-administered medications for this visit.      Past Surgical History  Past Surgical History:   Procedure Laterality Date   • Colonoscopy  November of 2012   • Finger surgery      left fingers partially amputated   • Vasectomy         family history includes Alcohol Abuse in his father; Cataracts in his father and mother; Diabetes in his father and mother; Glaucoma in his mother; Retinal detachment in his father.     reports that he has never smoked. He has never used smokeless tobacco.  · I have reviewed the patient's medications and allergies, past medical, surgical, social and family history, updating these as appropriate. See Histories section of the EMR for a display of this information.  · All other systems were reviewed and are negative except for pertinent positives and negatives as documented in the history of present illness.    PHYSICAL EXAMINATION  Vital Signs:    Visit Vitals  /70   Pulse 68   Resp 16   Ht 5' 11\" (1.803  m)   Wt 113.9 kg   BMI 35.01 kg/m²     Constitutional:  As of stated age, in no acute distress, pleasant.  HEENT:  Normocephalic. Atraumatic. PERRLA, EOMI (Pupils equal, round, reactive to light and accommodation, extraocular movements intact), anicteric, sclerae, conjunctivae and eyelids are normal.   Cardiovascular:  Normal heart rate. Normal rhythm. No murmurs. No rubs. No gallops.    Respiratory:   Clear to auscultation, respiratory effort normal.   Neurologic:  Alert and oriented times 3. Normal motor function.  Psychiatric: Mood and affect normal. Memory intact.    ASSESSMENT AND PLAN  · Type 2 diabetes not well controlled.  A1 c 7.9%.  Patient was advised to take 5 units of Humalog with his breakfast 10 with lunch and 12 with dinner continue with Tresiba 14 units for now.  We will see if patient can tolerate Trulicity instead of the Ozempic.  Samples of 1.5 mg Trulicity weekly dose given to the patient.  Patient will record his morning fasting and postprandial numbers over the next 2 weeks and see us then    The ASCVD Risk score ([place] RADHA Jr., et al., 2013) failed to calculate for the following reasons:    The valid total cholesterol range is 130 to 320 mg/dL    No orders of the defined types were placed in this encounter.

## 2022-01-21 NOTE — PROGRESS NOTES
Interventional Pulmonology Progress Note     SUBJECTIVE/Interval history:  Worsening hypoxia overnight, remains on PCAC, intubated/sedated.  No fevers but tmax 100    OBJECTIVE:   01/21/22  0600 01/21/22  0630 01/21/22  0640 01/21/22 32.1 31.2 31.0   RDW 14.0 14.4 14.2   NEPRELIM 4.18 5.04 18.57*   WBC 7.4 8.4 21.9*   .0 320.0 343.0     No results for input(s): BNP in the last 168 hours. No results for input(s): TROP, CK in the last 168 hours.   Recent Labs   Lab 01/18/22  0422 20 mg Intravenous BID   • Insulin Aspart Pen  1-10 Units Subcutaneous 4 times per day   • guaiFENesin  400 mg Per G Tube Q8H [address]   • mirtazapine  15 mg Oral Nightly   • lidocaine-menthol  1 patch Transdermal Daily     fentanyl, albuterol, fentaNYL citrate

## 2022-01-21 NOTE — CONSULTS
COVID-19 Daily Discharge Readiness-Nursing    O2 Sat at Rest:     Low 90's % vent FIO@ 75- PEEP +8   O2 Sat with Exertion:    % on    liters   Temperature max from last 24 hrs: Temp (24hrs), Av °F (37.2 °C), Min:98.1 °F (36.7 °C), Max:100 °F (37.8 °C)

## 2022-01-21 NOTE — DIETARY NOTE
[address]    Pt does not meet malnutrition criteria. NUTRITION INTERVENTION:    1. Enteral Nutrition - via NGT    Vital High Protein: increase to new goal rate of 55 ml/hr x 24 hours.     · If no IVF, recommend free water flush Per chart, PO intake has been good, averaging ~88% of meals over the past week. Briefly spoke with pt over the phone. Pt reports not always getting his ONS. Denies GI symptoms at this time. Pt continues to have high O2 needs and unable to safely discharge. Yes    GI SYSTEM REVIEW: Last BM:1/21    NUTRITION RELATED PHYSICAL FINDINGS:     1. Body Fat/Muscle Mass: unable to assess due to isolation precautions. Will attempt at follow up or as able      2.  Fluid Accumulation: generalized edema     NUTRITION PRESC

## 2022-01-21 NOTE — PROGRESS NOTES
Pietro Patient Status:  Inpatient    1975 MRN [id number]   [facility] 4SW-A Attending Nehemiah Bence, MD   [facility] Day # 50 PCP PHYSICIAN NONSTAFF     Critical Care Progress Note     Date of Admission: 12/3/2021 senna (SENOKOT) syrup 8.8 mg, 5 mL, Oral, BID  •  docusate (COLACE) liquid 100 mg, 100 mg, Oral, BID  •  albuterol 108 (90 Base) MCG/ACT inhaler 8 puff, 8 puff, Inhalation, Q4H PRN  •  fentaNYL citrate (SUBLIMAZE) 0.05 MG/ML injection 25 mcg, 25 mcg, Intra 0.5 mg, 0.5 mg, Intravenous, Q6H PRN **OR** LORazepam (ATIVAN) injection 1 mg, 1 mg, Intravenous, Q6H PRN  •  sodium chloride (SALINE MIST) 1 spray, 1 spray, Each Nare, Q3H PRN  •  glucose (DEX4) oral liquid 15 g, 15 g, Oral, Q15 Min PRN **OR** glucose-vit lesions.     Lab Results   Component Value Date    WBC 21.9 01/21/2022    RBC 3.60 01/21/2022    HGB 10.2 01/21/2022    HCT 32.9 01/21/2022    MCV 91.4 01/21/2022    MCH 28.3 01/21/2022    MCHC 31.0 01/21/2022    RDW 14.2 01/21/2022    .0 01/21/2022 completed course of dexamethasone and remdesivir  - decadron restarted 12/24 due to elevated inflammatory markers and lack of clinical improvement, now weaned off as of 1/16  - baricitinib completed   - D dimer was elevated -- was put on empiric full dose

## 2022-01-21 NOTE — PROGRESS NOTES
[facility]     Hospitalist Progress Note     Saturnino Banks Patient Status:  Inpatient    1975 MRN [id number]   [facility] 4SW-A Attending Tuan Veliz MD   Hosp Day # 50 PCP PHYSICIAN NONSTAFF     Chief Complaint: sob    Subject Pro-Calcitonin  No results for input(s): PCT in the last 168 hours. Cardiac  No results for input(s): TROP, PBNP in the last 168 hours. Creatinine Kinase  No results for input(s): CK in the last 168 hours.     Inflammatory Markers  No results fo meropenem    -Hyperglycemia - new DM2  -monitor accuchecks  -insulin   -a1c 13.1    -Anemia - suspect critical illness as etiology; no signs of bleeding, stable    -Shock, due to sepsis, vent, sedation,   2D echo reviewed  Wean pressors as able          Pl

## 2022-01-21 NOTE — CM/SW NOTE
Care Progression Note:  Active Acute Medical Issue:   Pneumonia due to COVID-19 virus   ARDS  Full vent support  Hyperglycemia  Fever    Other Contributing Medical Factors/Dx:  Undiagnosed T2DM, unvaccinated    Length of stay: 50  GMLOS: (pend)  Avoidable

## 2022-01-21 NOTE — PLAN OF CARE
Pt continued with Fio2 60% throughout the day and tolerated well. Copious amount of thick yellow secretions via ETT requiring frequent suctioning. BP remains very labile requiring frequent adjustments to levophed.   No purposeful movement noted, no withd

## 2022-01-21 NOTE — PROGRESS NOTES
01/21/22 0434   Vent Information   $ RT Standby Charge (per 15 min) 1   $ Vent Care / Non-Invasive Subsequent Day Yes   Ventilator Initiation 12/22/21   Ventilation Day(s) 31   Interface Invasive   Vent Type    Vent ID [id number]   Vent Mode PC/AC   Sett

## 2022-01-22 NOTE — PROGRESS NOTES
01/22/22 0409   Vent Information   $ RT Standby Charge (per 15 min) 1   Ventilator Initiation 12/22/22   Ventilation Day(s) 32   Interface Invasive   Vent Type    Vent ID [id number]   Vent Mode PC/AC   Settings   FiO2 (%) 90 %   Resp Rate (Set) 28   Wavefo

## 2022-01-22 NOTE — PROGRESS NOTES
Pietro Patient Status:  Inpatient    1975 MRN [id number]   [facility] 4SW-A Attending Rosette Macario MD   [address] Day # 52 PCP PHYSICIAN NONSTAFF     Critical Care Progress Note     Date of Admission: 12/3/2021 (SUBLIMAZE) 0.05 MG/ML injection 25 mcg, 25 mcg, Intravenous, Q30 Min PRN **OR** fentaNYL citrate (SUBLIMAZE) 0.05 MG/ML injection 50 mcg, 50 mcg, Intravenous, Q30 Min PRN  •  acetaminophen (TYLENOL) tab 650 mg, 650 mg, Oral, Q4H PRN **OR** acetaminophen ( liquid 15 g, 15 g, Oral, Q15 Min PRN **OR** glucose-vitamin C (DEX-4) chewable tab 4 tablet, 4 tablet, Oral, Q15 Min PRN **OR** dextrose 50 % injection 50 mL, 50 mL, Intravenous, Q15 Min PRN **OR** glucose (DEX4) oral liquid 30 g, 30 g, Oral, Q15 Min PRN * 31.0 01/22/2022    MCHC 33.9 01/22/2022    RDW 14.4 01/22/2022    .0 01/22/2022     Lab Results   Component Value Date     01/22/2022    K 4.0 01/22/2022    CL 93 01/22/2022    CO2 36.0 01/22/2022    BUN 19 01/22/2022    CREATSERUM 0.22 01/22/ -- was put on empiric full dose LMWH (12/21 start date). VTE workup was negative.  Transitioned to prophylactic dosing after 4 weeks of tx dosing.   3. ID- MRSA bacteremia/pna. Grew MRSA from PICC which was subsequently removed  - completed course of vancom

## 2022-01-22 NOTE — PLAN OF CARE
Assumed care of patient at 0730 vented and sedated. Propofol/fentynal/precedex. Does not follow commands, non responsive to painful stimuli, pupils equil and reactive.  Upon initial assessment patient was hypertensive, tachycardic with rates in 130s, febril

## 2022-01-22 NOTE — PLAN OF CARE
Received pt shortly after 11pm.     Patient resting comfortably in bed. On propofol/ precedex/ fentanyl for sedation. On ventilator, 80% fiO2, peep 8.  Currently with lots of thick secretions via both ett and oral suctioning; requiring sedation for response

## 2022-01-23 NOTE — PROGRESS NOTES
Pietro Patient Status:  Inpatient    1975 MRN [id number]   [facility] 4SW-A Attending Amy Tilley MD   [facility] Day # 48 PCP PHYSICIAN NONSTAFF     Critical Care Progress Note     Date of Admission: 12/3/2021 (COLACE) liquid 100 mg, 100 mg, Oral, BID  •  albuterol 108 (90 Base) MCG/ACT inhaler 8 puff, 8 puff, Inhalation, Q4H PRN  •  fentaNYL citrate (SUBLIMAZE) 0.05 MG/ML injection 25 mcg, 25 mcg, Intravenous, Q30 Min PRN **OR** fentaNYL citrate (SUBLIMAZE) 0.0 (ATIVAN) injection 1 mg, 1 mg, Intravenous, Q6H PRN  •  sodium chloride (SALINE MIST) 1 spray, 1 spray, Each Nare, Q3H PRN  •  glucose (DEX4) oral liquid 15 g, 15 g, Oral, Q15 Min PRN **OR** glucose-vitamin C (DEX-4) chewable tab 4 tablet, 4 tablet, Oral, Results   Component Value Date    WBC 26.1 01/23/2022    RBC 3.29 01/23/2022    HGB 9.1 01/23/2022    HCT 29.9 01/23/2022    MCV 90.9 01/23/2022    MCH 27.7 01/23/2022    MCHC 30.4 01/23/2022    RDW 14.1 01/23/2022    .0 01/23/2022     Lab Results 11/25; diagnosed 12/3. Unvaccinated.    - completed course of dexamethasone and remdesivir  - decadron restarted 12/24 due to elevated inflammatory markers and lack of clinical improvement, now weaned off as of 1/16  - baricitinib completed   - D dimer was room to increase tidal volumes safely. Will start nimbex as well. CXR reviewed, no PTX. Called Daughter, explained grave prognosis and high likelihood that pt would die. Son and uncle to come to bedside.     Additional Critical care time  60 minutes

## 2022-01-23 NOTE — PROGRESS NOTES
[facility]     Hospitalist Progress Note     Tenisha Reece Patient Status:  Inpatient    1975 MRN [id number]   [facility] 4SW-A Attending Dylan Yañez MD   Hosp Day # 48 PCP PHYSICIAN NONSTAFF     Chief Complaint: sob    Subject 1.19            COVID-19 Lab Results    COVID-19  Lab Results   Component Value Date    COVID19 Detected (A) 12/04/2021    COVID19 Detected (A) 12/03/2021       Pro-Calcitonin  Recent Labs   Lab 01/22/22  1012   PCT 1.12*       Cardiac  No results for inpu improvement  - baricitinib completed   - empiric full dose LMWH  - MRSA from PICC-removed  - continue vanco  - completed meropenem (12/21 -12/28)   - now redeveloping fevers, restarted on abx per ID, WBC higher    -Hyperglycemia - new DM2  -monitor accuche

## 2022-01-23 NOTE — PROGRESS NOTES
01/23/22 0338   Vent Information   $ RT Standby Charge (per 15 min) 1   Ventilator Initiation 12/22/22   Ventilation Day(s) 33   Interface Invasive   Vent Type    Vent ID 21   Vent Mode PC/AC   Settings   FiO2 (%) 100 %   Resp Rate (Set) 28   Vt (S

## 2022-01-23 NOTE — PROGRESS NOTES
[facility]                INFECTIOUS DISEASE PROGRESS NOTE    Haritha Moeller Patient Status:  Inpatient    1975 MRN [id number]   [facility] 5NW-A Attending Adelaide Herrera MD   Hosp Day # 48 PCP PHYSICIAN NONSTAFF     Anti Status: None (Preliminary result)    Collection Time: 01/22/22  3:34 PM    Specimen: Blood,peripheral   Result Value Ref Range    Blood Culture Result No Growth 1 Day N/A   2.  Urine Culture, Routine     Status: Abnormal    Collection Time: 01/22/22 10:11 A

## 2022-01-23 NOTE — PLAN OF CARE
Received proned and  sedated with Propofol/Precedex, Fentanyl;  unresponsive to pain stimulation.  Remains vented to ETT FIO2 100%, PEEP 10+; breathing over the vent, suctioned as needed, obtained thick tan secretions; sedation adjusted accordingly; ST on t nutritional consult as needed  - Establish a toileting routine/schedule  - Consider collaborating with pharmacy to review patient's medication profile  Outcome: Progressing

## 2022-01-23 NOTE — PROCEDURES
Central Venous Catheter Placement Note  Attending present: Dr. Teodora Lee  : Dr. Teodora Lee  Indication: Access, monitoring, and resuscitation  Location:  Left internal jugular vein  Catheter type: Triple lumen catheter  Pre-procedure: Patient was identified

## 2022-01-23 NOTE — CONSULTS
[facility] Dosing Service    Initial Pharmacokinetic Consult for Vancomycin AUC Dosing    Kris Castillo is a 42year old patient who  had been on vanco for 15 days ( completed on 1/17)  for MRSA bacteremia/pneumonia.   Pharmacy has been asked to dose v

## 2022-01-24 NOTE — PLAN OF CARE
Assumed care of patient at 0730. Patient sedated on propofol/precedex and fentanyl. Vented, 100% +10. Per order, patient proned at 21 , progressively became more hypotensive and hypoxic. maxed levo, added vaso, gee and epi.  Dr. Silvana Dobbins at bedside, patient p

## 2022-01-24 NOTE — PROGRESS NOTES
[facility]     Hospitalist Progress Note     Cooper Lambert Patient Status:  Inpatient    1975 MRN [id number]   [facility] 4SW-A Attending Jayy Marie MD   Hosp Day # 46 PCP PHYSICIAN NONSTAFF     Chief Complaint: sob    Subject Component Value Date    COVID19 Detected (A) 12/04/2021    COVID19 Detected (A) 12/03/2021       Pro-Calcitonin  Recent Labs   Lab 01/22/22  1012   PCT 1.12*       Cardiac  No results for input(s): TROP, PBNP in the last 168 hours.     Creatinine Kinase - empiric full dose LMWH  - MRSA from PICC-removed  - continue vanco  - completed meropenem (12/21 -12/28)   - fevers improved, restarted on abx per ID, WBC stable    -Hyperglycemia - new DM2  -monitor accuchecks  -insulin, basal dose adjusted as sugars

## 2022-01-24 NOTE — PROGRESS NOTES
Pietro Patient Status:  Inpatient    1975 MRN [id number]   [facility] 4SW-A Attending Tish Brantley MD   [facility] Day # 46 PCP PHYSICIAN NONSTAFF     Critical Care Progress Note     Date of Admission: 12/3/2021 Subcutaneous, Daily  •  insulin detemir (LEVEMIR) 100 UNIT/ML flextouch 10 Units, 10 Units, Subcutaneous, Daily  •  Multivitamin Chewtab (ADULT) (CENTRUM) 1 tablet, 1 tablet, Per NG Tube, Daily  •  HYDROmorphone (DILAUDID) tab 1 mg, 1 mg, Oral, q6h  •  fen 0.9% 100 mL infusion, 0.2-1.5 mcg/kg/hr (Dosing Weight), Intravenous, Continuous  •  guaiFENesin (MUCINEX) tab 400 mg, 400 mg, Per G Tube, Q8H [address]  •  oxymetazoline (NASAL DECONGESTANT SPRAY) 0.05 % nasal spray 2 spray, 2 spray, Each Nare, Q12H PRN  •  braulio last 3 completed shifts:   In: 6979.4 [I.V.:4202.4; NG/GT:2177; IV PIGGYBACK:600]  Out: 2895 [Urine:1110; Emesis/NG output:610]  No intake/output data recorded.      Physical Exam:                          DRYTUHW: [id number], sedated                         COVID-19 pneumonia and subsequent ARDS.  Now with MRSA pneumonia   - Cont full vent support, wean PEEP/FiO2 as able, now at 100% and PEEP 10  - ABG reviewed, Plateau pressures remain markedly elevated ~35  - prone per protocol   - on nimbex (initiated 1/23) critical illness as etiology; no signs of bleeding  - monitor counts and transfuse PRN  9.  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  eeg with slowing.    - seen by neuro

## 2022-01-24 NOTE — PLAN OF CARE
Received at 1300 [facility] following AM RN report; pt is paralyzed and sedated with Nimbex, Propofol, precedex, and fentanyl.  BIS 17-20; sedation titrated down to achieve BIS goal of 50-70; TOF Q 4hrs done on the rt unar-4 twitches noted each time;  ST  100-110 on t

## 2022-01-24 NOTE — PROGRESS NOTES
[facility]                INFECTIOUS DISEASE PROGRESS NOTE    [place] Handsome Patient Status:  Inpatient    1975 MRN [id number]   [facility] 5NW-A Attending Jose Rose MD   Hosp Day # 46 PCP PHYSICIAN NONSTAFF     Anti (Preliminary result)    Collection Time: 01/22/22  3:34 PM    Specimen: Blood,peripheral   Result Value Ref Range    Blood Culture Result No Growth 1 Day N/A   2.  Urine Culture, Routine     Status: Abnormal    Collection Time: 01/22/22 10:11 AM    Specimen

## 2022-01-24 NOTE — CM/SW NOTE
Care Progression Note:  Active Acute Medical Issue:   Pneumonia due to COVID-19 virus     Other Contributing Medical Factors/Dx:   Intubated 12/20, ARDS, MRSA pneumonia, Hyperglycemia, anemia, Septic shock, LORE,     Length of stay: 51  GMLOS: 11.9  Avoidabl

## 2022-01-24 NOTE — CONSULTS
[facility]  Report of Consultation    Reymundo Forgnajma Patient Status:  Inpatient    1975 MRN [id number]   [facility] 4SW-A Attending Jonah Valencia MD   Hosp Day # 46 PCP PHYSICIAN NONSTAFF       Assessment / Plan:    1) LORE- Cr 0 He reports previous drug use.     Allergies:  No Known Allergies    Medications:    Current Facility-Administered Medications:   •  INSULIN STANDARD BOLUS FROM BAG 7.1 Units infusion, 0.1 Units/kg, Intravenous, Once  •  Insulin Regular Human (NOVOLIN R) 100 day  •  fentanyl (SUBLIMAZE) 1000 mcg/100 ml NS premix infusion,  mcg/hr, Intravenous, Continuous PRN  •  gabapentin (NEURONTIN) cap 300 mg, 300 mg, Per NG Tube, TID  •  senna (SENOKOT) syrup 8.8 mg, 5 mL, Oral, BID  •  docusate (COLACE) liquid 100 m MG/5ML syrup 5 mL, 5 mL, Oral, Q4H PRN  •  LORazepam (ATIVAN) injection 0.5 mg, 0.5 mg, Intravenous, Q6H PRN **OR** LORazepam (ATIVAN) injection 1 mg, 1 mg, Intravenous, Q6H PRN  •  sodium chloride (SALINE MIST) 1 spray, 1 spray, Each Nare, Q3H PRN  •  glu edema  Neurologic: Cranial nerves grossly intact, moving all extremities  Skin: Warm and dry, no rashes      Laboratory Data:  Lab Results   Component Value Date    WBC 26.0 01/24/2022    HGB 9.4 01/24/2022    HCT 29.9 01/24/2022    .0 01/24/2022

## 2022-01-24 NOTE — PROGRESS NOTES
01/24/22 0323   Vent Information   $ RT Standby Charge (per 15 min) 1   $ Vent Care / Non-Invasive Subsequent Day Yes   Ventilator Initiation 12/22/22   Ventilation Day(s) 34   Interface Invasive   Vent Type    Vent ID 21   Vent Mode PC/AC   Settin

## 2022-01-25 NOTE — PROGRESS NOTES
[facility]                INFECTIOUS DISEASE PROGRESS NOTE    Rodríguez Hernandez Patient Status:  Inpatient    1975 MRN [id number]   [facility] 5NW-A Attending Miriam Polanco MD   Hosp Day # 46 PCP PHYSICIAN NONSTAFF     Anti < > = values in this interval not displayed. Vancomycin Trough (ug/mL)   Date Value   01/14/2022 9.1 (L)     Microbiology    Hospital Encounter on 12/03/21   1.  Blood Culture     Status: None (Preliminary result)    Collection Time: 01/22/22  3:34

## 2022-01-25 NOTE — PROGRESS NOTES
[facility] Pharmacy Note:  Renal Dose Adjustment    Dereje Townsend has been prescribed famotidine (PEPCID) 20 mg intravenously every 12 hours. Estimated Creatinine Clearance: 38.3 mL/min (A) (based on SCr of 2.22 mg/dL (H)).     Calculated creatinine clearanc

## 2022-01-25 NOTE — PROGRESS NOTES
[facility] Pharmacy Note:  Renal Adjustment for cefepime (MAXIPIME)    Marc Shipman is a 42year old patient who has been prescribed cefepime (MAXIPIME) 1 g IVPB every 8 hrs.       The estimated creatinine clearance is 38.3 mL/min (A) (based on SCr of 2.22 mg/

## 2022-01-25 NOTE — PLAN OF CARE
Assumed care after RN report; Medically paralyzed; sedated; on vent. Sanaz. Nimbex. ToF R ulnar 4/4 twitches noted. Fent/propofol/precedex; titrated to BIS. Hypotensive; Vaso titrated off; Levo titrated. L artline intact. L IJ intact.  Hypothermic; cooling

## 2022-01-25 NOTE — DIETARY NOTE
[address] [facility]    Pt does not meet malnutrition criteria. NUTRITION INTERVENTION:    1. Enteral Nutrition - via NGT. TF currently on hold d/t septic shock.     When okay to restart TF's per MD, recommend TF formula change to Nepro 742 lipid kcal. Plans for possible trach and PEG per EMR.    12/31- Pt remains intubated. Propofol infusing at 37.5 ml/hr, which provides: 990 lipid kcal. Will adjust TF's; see recs above. 12/27- pt remains in icu and intubated, sedated on Propofol.  To Weight(s) for the past 336 hrs:   Weight   01/23/22 0454 70.6 kg (155 lb 10.3 oz)   01/21/22 0200 70.6 kg (155 lb 10.3 oz)   01/19/22 0633 70.8 kg (156 lb 1.4 oz)   01/16/22 0558 69 kg (152 lb 1.9 oz)   01/15/22 0500 70.4 kg (155 lb 3.3 oz)       Wt Readin

## 2022-01-25 NOTE — PROGRESS NOTES
[place] Patient Status:  Inpatient    1975 MRN [id number]   [facility] 4SW-A Attending Jonah Valencia MD   [address] Day # 46 PCP PHYSICIAN NONSTAFF     Critical Care Progress Note     Date of Admission: 12/3/2021 Intravenous, PRN  •  artificial tears 83-15 % ophthalmic ointment 1 Application, 1 Application, Both Eyes, BID  •  Fluconazole in Sodium Chloride (DIFLUCAN) IVPB premix 400 mg, 400 mg, Intravenous, Q24H  •  enoxaparin (LOVENOX) 40 MG/0.4ML injection 40 mg, sodium chloride 0.9% 100 mL infusion, 0.2-1.5 mcg/kg/hr (Dosing Weight), Intravenous, Continuous  •  guaiFENesin (MUCINEX) tab 400 mg, 400 mg, Per G Tube, Q8H [address]  •  oxymetazoline (NASAL DECONGESTANT SPRAY) 0.05 % nasal spray 2 spray, 2 spray, Each Nare, I/O last 3 completed shifts: In: 5708.6 [I.V.:4468. 6; NG/GT:590; IV PIGGYBACK:650]  Out: 626 [Urine:162; Emesis/NG output:575]  No intake/output data recorded.      Physical Exam:                          General: intubated, sedated                      pneumonia and subsequent ARDS.  Now with MRSA pneumonia   - Cont full vent support, wean PEEP/FiO2 as able, now at 100% and PEEP 10  - ABG reviewed, Plateau pressures remain markedly elevated ~35  - will discuss proning if unable to wean  - on nimbex (initi accuchecks  - insulin gtt   8. Anemia - suspect critical illness as etiology; no signs of bleeding  - monitor counts and transfuse PRN  9.  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

## 2022-01-25 NOTE — PROGRESS NOTES
[facility]  Nephrology Progress Note    Judith Ward Attending:  Nehemiah Bence, MD       Assessment and Plan:    1) LORE- oliguric ATN x 36 hrs with hyperkalemia / abrupt transaminitis / hypotension c/w septic shock. Remains on pressors.  PLAN- adju 01/25/2022    BUN 95 01/25/2022     01/25/2022    K 5.9 01/25/2022    CL 90 01/25/2022    CO2 28.0 01/25/2022     01/25/2022    CA 7.0 01/25/2022    ALB 1.7 01/25/2022    ALKPHO 310 01/25/2022    BILT 2.8 01/25/2022    TP 6.5 01/25/2022    AST (DIFLUCAN) IVPB premix 400 mg, 400 mg, Intravenous, Q24H  enoxaparin (LOVENOX) 40 MG/0.4ML injection 40 mg, 40 mg, Subcutaneous, Daily  Multivitamin Chewtab (ADULT) (CENTRUM) 1 tablet, 1 tablet, Per NG Tube, Daily  HYDROmorphone (DILAUDID) tab 1 mg, 1 mg, nasal spray 2 spray, 2 spray, Each Nare, Q12H PRN  mirtazapine (REMERON) tab 15 mg, 15 mg, Oral, Nightly  benzonatate (TESSALON) cap 200 mg, 200 mg, Oral, Q4H PRN  dextromethorphan-guaiFENesin (ROBITUSSIN-DM)  MG/5ML liquid 5 mL, 5 mL, Oral, Q6H PRN

## 2022-01-26 NOTE — PROGRESS NOTES
Received pt on full support. BS are diminished. Nebs given as scheduled. No changes made. Will continue to monitor.        01/26/22 0520   Vent Information   $ RT Standby Charge (per 15 min) 1   Ventilator Initiation 12/22/22   Ventilation Day(s) 36   Inter

## 2022-01-26 NOTE — PROGRESS NOTES
[facility] Pharmacy Note:  Renal Dose Adjustment for enoxaparin (LOVENOX)    Judith Ward has been prescribed enoxaparin 40 mg subcutaneously every 24 hours. Estimated Creatinine Clearance: 28.9 mL/min (A) (based on SCr of 2.94 mg/dL (H)).     Calculated Cr

## 2022-01-26 NOTE — PROGRESS NOTES
[facility]                INFECTIOUS DISEASE PROGRESS NOTE    Galindo Medici Patient Status:  Inpatient    1975 MRN [id number]   [facility] 5NW-A Attending Lachelle Ramirez MD   Hosp Day # 48 PCP PHYSICIAN NONSTAFF     Anti values in this interval not displayed. Vancomycin Trough (ug/mL)   Date Value   01/14/2022 9.1 (L)     Microbiology    Hospital Encounter on 12/03/21   1.  Blood Culture     Status: None (Preliminary result)    Collection Time: 01/25/22 12:34 AM    Sp

## 2022-01-26 NOTE — PROGRESS NOTES
[facility]  Nephrology Progress Note    Cindy Olsen Attending:  Lynnette Dickinson MD       Assessment and Plan:    1) LORE- oliguric ATN x 36 hrs with hyperkalemia / abrupt transaminitis / hypotension c/w septic shock. Remains on max levo + vaso.  REGINE Lab Results   Component Value Date    WBC 35.9 01/26/2022    HGB 9.1 01/26/2022    HCT 27.3 01/26/2022    .0 01/26/2022    CREATSERUM 2.94 01/26/2022     01/26/2022     01/26/2022    K 5.5 01/26/2022    CL 83 01/26/2022    CO2 31.0 01 Weight), Intravenous, PRN  artificial tears 83-15 % ophthalmic ointment 1 Application, 1 Application, Both Eyes, BID  Fluconazole in Sodium Chloride (DIFLUCAN) IVPB premix 400 mg, 400 mg, Intravenous, Q24H  enoxaparin (LOVENOX) 40 MG/0.4ML injection 40 mg, (Dosing Weight), Intravenous, Continuous  guaiFENesin (MUCINEX) tab 400 mg, 400 mg, Per G Tube, Q8H [address]  oxymetazoline (NASAL DECONGESTANT SPRAY) 0.05 % nasal spray 2 spray, 2 spray, Each Nare, Q12H PRN  mirtazapine (REMERON) tab 15 mg, 15 mg, Oral, Nightl

## 2022-01-26 NOTE — PROGRESS NOTES
[place] Patient Status:  Inpatient    1975 MRN [id number]   [facility] 4SW-A Attending Tali Beauchamp MD   [facility] Day # 48 PCP PHYSICIAN NONSTAFF     Critical Care Progress Note     Date of Admission: 12/3/2021 Intravenous, PRN    artificial tears 83-15 % ophthalmic ointment 1 Application, 1 Application, Both Eyes, BID    Fluconazole in Sodium Chloride (DIFLUCAN) IVPB premix 400 mg, 400 mg, Intravenous, Q24H    enoxaparin (LOVENOX) 40 MG/0.4ML injection 40 mg, 40 100 mL infusion, 0.2-1.5 mcg/kg/hr (Dosing Weight), Intravenous, Continuous    guaiFENesin (MUCINEX) tab 400 mg, 400 mg, Per G Tube, Q8H [address]    oxymetazoline (NASAL DECONGESTANT SPRAY) 0.05 % nasal spray 2 spray, 2 spray, Each Nare, Q12H PRN    mirtazapine 3 completed shifts: In: 6472.6 [I.V.:5922.6; NG/GT:50; IV PIGGYBACK:500]  Out: 42 [Urine:37; Emesis/NG output:5]  No intake/output data recorded.       Physical Exam:                          General: intubated, sedated                          HEENT: ETT subsequent ARDS.  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   eeg with slowing.    - seen by neuro  Nut

## 2022-01-26 NOTE — PLAN OF CARE
Assumed care of patient at change of shift. Pt orally intubated and sedated. Pharm paralyzed. Weaning sedation for bis goal 40-60. TOF 4/4,but pt synchronous with vent. fio2 90% +10 peep. Esophageal temp probe, cooling blanket removed around noon.  scds lov

## 2022-01-26 NOTE — PLAN OF CARE
Assumed care after RN report; Medically paralyzed; sedated; on vent. Sanaz. Nimbex. ToF R ulnar 4/4 twitches noted. Fent/propofol/precedex; titrated to BIS. L artline intact; hypotensive; Levo/Vaso; titrated. . L IJ intact.  Angel; diminished UO. OGT; clampe

## 2022-01-26 NOTE — PROGRESS NOTES
01/26/22 1418   Vent Information   $ RT Standby Charge (per 15 min) 1   Ventilator Initiation 12/22/22   Ventilation Day(s) 36   Interface Invasive   Vent Type    Vent -21   Vent Mode VC+   Settings   FiO2 (%) 100 %   Resp Rate (Set) 36   Vt

## 2022-01-27 NOTE — PROGRESS NOTES
[facility]  Nephrology Progress Note    Saturnino Banks Attending:  Tali Beauchamp MD       Assessment and Plan:    1) LORE- oliguric ATN x 36 hrs with hyperkalemia / abrupt transaminitis / hypotension c/w septic shock. Remains on max levo + vaso.  No clubbing, cyanosis; no edema  Skin: Warm and dry, no rashes       Labs:   Lab Results   Component Value Date    WBC 42.1 01/27/2022    HGB 8.9 01/27/2022    HCT 26.3 01/27/2022    .0 01/27/2022    CREATSERUM 3.41 01/27/2022     01/27/2022 mg in sodium chloride 0.9% 200 mL infusion, 0.5-10 mcg/kg/min (Dosing Weight), Intravenous, Continuous  CISATRACURIUM BOLUS FROM BAG 15.6 mg infusion, 0.2 mg/kg (Dosing Weight), Intravenous, PRN  artificial tears 83-15 % ophthalmic ointment 1 Application, (Dosing Weight), Intravenous, Continuous  guaiFENesin (MUCINEX) tab 400 mg, 400 mg, Per G Tube, Q8H [facility]  oxymetazoline (NASAL DECONGESTANT SPRAY) 0.05 % nasal spray 2 spray, 2 spray, Each Nare, Q12H PRN  mirtazapine (REMERON) tab 15 mg, 15 mg, Oral, Nightl

## 2022-01-27 NOTE — PROGRESS NOTES
Received pt on full support. Nebs given as scheduled. A-line replaced. BS are diminished. Scant secretions when suctioned. No other changed made.         01/27/22 0308   Vent Information   $ RT Standby Charge (per 15 min) 1   Ventilator Initiation 12/22/22

## 2022-01-27 NOTE — PROCEDURES
Arterial Line  Performed by: Callie Lucero Information and Staff     Procedure Start: 0100  Patient Location:  ICU  Indication: continuous blood pressure monitoring and blood sampling needed    Site Identification: real time ultrasound guided, s

## 2022-01-27 NOTE — PROGRESS NOTES
[facility]     Hospitalist Progress Note     Fabian Cuellar Patient Status:  Inpatient    1975 MRN [id number]   [facility] 4SW-A Attending Valerie Rosenberg MD   Hosp Day # 47 PCP PHYSICIAN NONSTAFF     Chief Complaint: sob    Subject COVID-19 Lab Results    COVID-19  Lab Results   Component Value Date    COVID19 Detected (A) 12/04/2021    COVID19 Detected (A) 12/03/2021       Pro-Calcitonin  Recent Labs   Lab 01/22/22  1012   PCT 1.12*       Cardiac  No results for input(s): TROP, of clinical improvement  - baricitinib completed   - empiric full dose LMWH  - MRSA from PICC-removed  - continue vanco  - completed meropenem (12/21 -12/28)   - fevers improved, restarted on abx per ID, WBC improved     -Hyperglycemia - new DM2  -monitor

## 2022-01-27 NOTE — PLAN OF CARE
Received pt orally intubated to vent. On levophed, Vasopressin and Phenylephrine for BP. On Fentanyl, propofol and precedex for sedation. On nimbex for paralytic. Proned.  Intensivist spoke with daughter today and plan was to make pt DNR now and when family

## 2022-01-27 NOTE — PROGRESS NOTES
01/27/22 1117   Clinical Encounter Type   Visited With Patient and family together;Health care provider  (RN)   Routine Visit Follow-up   Continue Visiting No  (unless requested)   Crisis Visit Critical care   Patient's Supportive Strategies/Resources F

## 2022-01-27 NOTE — PLAN OF CARE
Assumed care after RN report; Medically paralyzed; sedated; on vent. Sanaz. Nimbex. ToF R ulnar 4/4 twitches noted. No cough/no gag/no movements.  BIS =0. Patient w/ o2 sats of 83-85%, CC APN Juan Friday Tung@[employer] - pt does better laying w/ bed flat and not rev

## 2022-01-27 NOTE — PROGRESS NOTES
[place] Patient Status:  Inpatient    1975 MRN [id number]   [facility] 4SW-A Attending Josephine Soto MD   [facility] Day # 47 PCP PHYSICIAN NONSTAFF     Critical Care Progress Note     Date of Admission: 12/3/2021 (NIMBEX) 200 mg in sodium chloride 0.9% 200 mL infusion, 0.5-10 mcg/kg/min (Dosing Weight), Intravenous, Continuous  •  CISATRACURIUM BOLUS FROM BAG 15.6 mg infusion, 0.2 mg/kg (Dosing Weight), Intravenous, PRN  •  artificial tears 83-15 % ophthalmic ointm (PRECEDEX) 800 mcg in sodium chloride 0.9% 100 mL infusion, 0.2-1.5 mcg/kg/hr (Dosing Weight), Intravenous, Continuous  •  guaiFENesin (MUCINEX) tab 400 mg, 400 mg, Per G Tube, Q8H [facility]  •  oxymetazoline (NASAL DECONGESTANT SPRAY) 0.05 % nasal spray 2 spray Encounters:  01/23/22 : 155 lb 10.3 oz (70.6 kg)  05/18/14 : 210 lb (95.3 kg)     I/O last 3 completed shifts: In: 7283.1 [I.V.:6633.1; IV PIGGYBACK:650]  Out: 12 [Urine:12]  No intake/output data recorded.       Physical Exam:                          Gen failure - intubated 12/20 due to COVID-19 pneumonia and subsequent ARDS.  Now with MRSA pneumonia   - Cont full vent support, wean PEEP/FiO2 as able, now at 100% and PEEP 10  - ABG reviewed, Plateau pressures remain markedly elevated 35-40  - will prone if 7. Hyperglycemia - new DM2  - monitor accuchecks  - insulin gtt   8. Anemia - suspect critical illness as etiology; no signs of bleeding  - monitor counts and transfuse PRN  9.  Encephalopathy - diaphroesis and agitation intermittently  - Likely with crit

## 2022-02-01 NOTE — DISCHARGE SUMMARY
[facility]  DISCHARGE SUMMARY     Lucia York Patient Status:  Inpatient    1975 MRN [id number]   [facility] 4SW-A Attending No att. providers found   Hosp Day # 47 PCP PHYSICIAN NONSTAFF     Date of Admission: 12/3/2021  D Amaury Anne MD  [place] [phone]    Schedule an appointment as soon as possible for a visit in 2 weeks  diabetic management     Appointments for Next 30 Days 2/1/2022 - 3/3/2022            None          Vital

## 2022-02-05 LAB
BASE EXCESS BLDA CALC-SCNC: 7.5 MMOL/L (ref ?–2)
BODY TEMPERATURE: 102 F
COHGB MFR BLD: 1.6 % SAT (ref 0–3)
FIO2: 100 %
HCO3 BLDA-SCNC: 35.7 MEQ/L (ref 21–27)
HGB BLD-MCNC: 9.2 G/DL
METHGB MFR BLD: 0.6 % SAT (ref 0.4–1.5)
PAW @ PEAK INSP FLOW SETTING VENT: 37 CM H2O
PCO2 BLDA: 83 MM HG (ref 35–45)
PH BLDA: 7.26 [PH] (ref 7.35–7.45)
PO2 BLDA: 69 MM HG (ref 80–100)
SAO2 % BLDA FROM PO2: 88 % (ref 92–100)
SAO2 % BLDA: 88 % (ref 92–100)
VENT RATE: 32 /MIN

## 2022-06-06 NOTE — PROGRESS NOTES
[facility]                INFECTIOUS DISEASE PROGRESS NOTE    Tenisha Reece Patient Status:  Inpatient    1975 MRN [id number]   [facility] 5NW-A Attending Yadira Min MD   Hosp Day # 52 PCP PHYSICIAN NONSTAFF     Anti Reviewed note and agree with assessment and POC.     Ruth Ann Mcleod, ROCT Blood Culture     Status: None    Collection Time: 01/15/22 10:49 AM    Specimen: Bld,Arterial Line; Blood   Result Value Ref Range    Blood Culture Result No Growth 5 Days N/A   2.  Urine Culture, Routine     Status: None    Collection Time: 01/06/22  2:15

## 2022-11-29 NOTE — PROGRESS NOTES
[facility]  Progress Note     Reymundo Blue Patient Status:  Inpatient    1975 MRN [id number]   [facility] 5NW-A Attending Fly Burciaga MD   [facility] Day # 16 PCP PHYSICIAN NONSTAFF     Chief Complaint: cough, SOB    S: Jaci Pro-Calcitonin  Recent Labs   Lab 12/20/21  1253 12/21/21  0252 12/21/21  0948   PCT 0.14 0.41* 8.70*       Cardiac  Recent Labs   Lab 12/20/21  1253   PBNP 95       Creatinine Kinase  No results for input(s): CK in the last 168 hours.     Inflammator DMII  a. Levemir/SSI  b. Increased levemir   c. Diabetic RN to see  d. OP endocrinology follow-up  5.  Hyponatremia      Quality:  · DVT Prophylaxis: lovenox  · CODE status: full  · Angel: none  · Central line: none    Will the patient be referred to TCC on [Chaperone Present] : A chaperone was present in the examining room during all aspects of the physical examination [No Acute Distress] : in no acute distress [Oriented x3] : oriented to person, place, and time [Tenderness] : ~T no ~M abdominal tenderness observed [Distended] : not distended [None] : no CVA tenderness [Aa ____] : Aa [unfilled] [Ba ____] : Ba [unfilled] [C ____] : C [unfilled] [GH ____] : GH [unfilled] [PB ____] : PB [unfilled] [TVL ____] : TVL  [unfilled] [Ap ____] : Ap [unfilled] [Bp ____] : Bp [unfilled] [D ____] : D [unfilled] [] : 0 [Normal] : normal [Soft] :  the cervix was soft

## 2023-03-06 NOTE — COVID NURSING ASSESSMENT
COVID-19 Daily Discharge Readiness-Nursing    O2 Sat at Rest:    93-95 % on Vapo 40L/80%   O2 Sat with Exertion:  GÓMEZ, not yet appropriate to participate in activity out of bed  Temperature max from last 24 hrs: Temp (24hrs), Av.4 °F (36.9 °C), Min:98 Downtime paper charting was used.

## 2023-07-05 NOTE — PROGRESS NOTES
[facility] Dosing Service    Follow-up Pharmacokinetic Consult for Vancomycin AUC Dosing     Juliann Jacobson is a 42year old patient who is being treated for MRSA bacteremia and pneumonia.   Patient is on day 13 of vancomycin and is currently receiving Name band;

## 2024-03-20 NOTE — PROGRESS NOTES
Carmel[place]abrina Patient Status:  Inpatient    1975 MRN [id number]   [facility] 4SW-A Attending Augusta Ordoñez MD   [facility] Day # 35 PCP PHYSICIAN NONSTAFF     Critical Care Progress Note     Date of Admission: 12/3/2021 I reviewed the H&P, I examined the patient, and there are no changes in the patient's condition.  Intravenous, Q30 Min PRN  •  acetaminophen (TYLENOL) tab 650 mg, 650 mg, Oral, Q4H PRN **OR** acetaminophen (TYLENOL) 160 MG/5ML oral liquid 650 mg, 650 mg, Oral, Q4H PRN **OR** acetaminophen (Tylenol) rectal suppository 650 mg, 650 mg, Rectal, Q4H PRN **O (ATIVAN) injection 0.5 mg, 0.5 mg, Intravenous, Q6H PRN **OR** LORazepam (ATIVAN) injection 1 mg, 1 mg, Intravenous, Q6H PRN  •  sodium chloride (SALINE MIST) 1 spray, 1 spray, Each Nare, Q3H PRN  •  glucose (DEX4) oral liquid 15 g, 15 g, Oral, Q15 Min PRN no edema                          Skin: No rashes or lesions.        Lab Results   Component Value Date    WBC 11.4 01/06/2022    RBC 3.56 01/06/2022    HGB 10.5 01/06/2022    HCT 32.2 01/06/2022    MCV 90.4 01/06/2022    MCH 29.5 01/06/2022    MCHC 32.6 01 diagnosed 12/3  - unvaccinated  - completed course of dexamethasone and remdesivir, decadron restarted 12/24 due to elevated inflammatory markers and lack of clinical improvement  - baricitinib completed   - Full AC (12/21 start date).  LE dopplers negative

## 2024-03-29 NOTE — PROGRESS NOTES
Critical Care Progress Note        NAME: Kris Castillo - ROOM: 901/671-A - MRN: [id number] - Age: 42year old - : 1975  Date of Admission: 12/3/2021  7:41 PM  Admission Diagnosis: Hypoxia [R09.02]  Type 2 diabetes mellitus with hyperglycemia, with Last OV 11/9/23 [facility]  Next OV 3/29/24 [facility]  BNP 3/9/24  [facility] OOT   minerals  1 tablet Oral Daily     Continuous Infusing Medication:  • fentanyl 100 mcg/hr (12/28/21 0701)   • propofol 65 mcg/kg/min (12/28/21 0854)   • norepinephrine Stopped (12/24/21 1200)   • dexmedetomidine 1.5 mcg/kg/hr (12/28/21 0448)     PRN Medicat decadron restarted on 12/24, repeat inflammatory markers and d-dimer- decreasing  3. Possible gram negative pneumonia  - High PCT  - Meropenem (12/21 - 12/28)   - monitor Cx- NGTD  4. Proph  - Lovenox  5. Dispo  - ICU.  At risk of further decompensation

## (undated) NOTE — LETTER
[address] [facility]     I agree to have a Peripherally Inserted Central Catheter (PICC) placed in my arm.    1. The PICC insertion procedure, care, maintenance, risks, benefits, and complications have been explained to me by my physic including risks, benefits, and side effects related to the alternatives and risks related to not receiving this procedure. 8.  I have expressed any questions about this procedure to my physician or the PICC Proceduralist and he/she has answered them.   I

## (undated) NOTE — LETTER
Prisca Magana [address] [place] [address]  Authorization for Surgical Operation and Procedure   Date:___________                                                                                            Time:__________  1.  I hereby aut potential risks that can occur: fever and allergic reactions, hemolytic reactions, transmission of diseases such as Hepatitis, AIDS and Cytomegalovirus (CMV) and fluid overload.   In the event that I wish to have an autologous transfusion of my own blood, o will determine when the applicable recovery period ends for purposes of reinstating the DNAR order.   10. Patients having a sterilization procedure: I understand that if the procedure is successful the results will be permanent and it will therefore be impo

## (undated) NOTE — LETTER
Prisca Magana [address]  [address] [facility] [address], [address] [facility]  Authorization for Surgical Operation and Procedure     Date:___________                                                                                                         Time:__________ reactions, hemolytic reactions, transmission of diseases such as Hepatitis, AIDS and Cytomegalovirus (CMV) and fluid overload. In the event that I wish to have an autologous transfusion of my own blood, or a directed donor transfusion.   I will discuss thi ends for purposes of reinstating the DNAR order.   10. Patients having a sterilization procedure: I understand that if the procedure is successful the results will be permanent and it will therefore be impossible for me to inseminate, conceive, or bear chil additional procedures as necessary. Some examples are: Starting or using an “IV” to give me medicine, fluids or blood during my procedure, and having a breathing tube placed to help me breathe when I’m asleep (intubation).  In the event that my heart stops complications include headache, bleeding, infection, seizure, irregular heart rhythms, and nerve injury.     I can change my mind about having anesthesia services at any time before I get the medicine.    ____________________________________________________